# Patient Record
Sex: MALE | Race: WHITE | NOT HISPANIC OR LATINO | Employment: OTHER
[De-identification: names, ages, dates, MRNs, and addresses within clinical notes are randomized per-mention and may not be internally consistent; named-entity substitution may affect disease eponyms.]

---

## 2017-01-25 ENCOUNTER — RECORDS - HEALTHEAST (OUTPATIENT)
Dept: ADMINISTRATIVE | Facility: OTHER | Age: 82
End: 2017-01-25

## 2017-02-22 ENCOUNTER — RECORDS - HEALTHEAST (OUTPATIENT)
Dept: ADMINISTRATIVE | Facility: OTHER | Age: 82
End: 2017-02-22

## 2017-05-08 ENCOUNTER — RECORDS - HEALTHEAST (OUTPATIENT)
Dept: ADMINISTRATIVE | Facility: OTHER | Age: 82
End: 2017-05-08

## 2017-08-29 ENCOUNTER — OFFICE VISIT - HEALTHEAST (OUTPATIENT)
Dept: FAMILY MEDICINE | Facility: CLINIC | Age: 82
End: 2017-08-29

## 2017-08-29 DIAGNOSIS — Z95.5 HISTORY OF HEART ARTERY STENT: ICD-10-CM

## 2017-08-29 DIAGNOSIS — I25.10 CORONARY ARTERY DISEASE: ICD-10-CM

## 2017-08-29 DIAGNOSIS — I10 ESSENTIAL HYPERTENSION: ICD-10-CM

## 2017-08-29 DIAGNOSIS — E03.9 HYPOTHYROIDISM: ICD-10-CM

## 2017-08-29 DIAGNOSIS — M54.16 LUMBAR RADICULAR PAIN: ICD-10-CM

## 2017-08-29 DIAGNOSIS — E78.5 HYPERLIPIDEMIA: ICD-10-CM

## 2017-08-29 DIAGNOSIS — E11.9 TYPE 2 DIABETES MELLITUS (H): ICD-10-CM

## 2017-08-29 DIAGNOSIS — Z95.0 PACEMAKER: ICD-10-CM

## 2017-08-29 LAB — HBA1C MFR BLD: 7.5 % (ref 3.5–6)

## 2017-08-29 RX ORDER — LANCETS 30 GAUGE
EACH MISCELLANEOUS 2 TIMES DAILY
Status: SHIPPED | COMMUNITY
Start: 2016-12-14

## 2017-08-29 RX ORDER — CHLORAL HYDRATE 500 MG
1 CAPSULE ORAL DAILY
Status: SHIPPED | COMMUNITY
Start: 2013-01-31

## 2017-08-29 ASSESSMENT — MIFFLIN-ST. JEOR: SCORE: 1500.09

## 2017-08-31 ENCOUNTER — COMMUNICATION - HEALTHEAST (OUTPATIENT)
Dept: FAMILY MEDICINE | Facility: CLINIC | Age: 82
End: 2017-08-31

## 2017-09-06 ENCOUNTER — COMMUNICATION - HEALTHEAST (OUTPATIENT)
Dept: FAMILY MEDICINE | Facility: CLINIC | Age: 82
End: 2017-09-06

## 2017-09-06 DIAGNOSIS — I10 ESSENTIAL HYPERTENSION: ICD-10-CM

## 2017-09-08 ENCOUNTER — COMMUNICATION - HEALTHEAST (OUTPATIENT)
Dept: FAMILY MEDICINE | Facility: CLINIC | Age: 82
End: 2017-09-08

## 2017-09-08 DIAGNOSIS — E11.9 TYPE 2 DIABETES MELLITUS (H): ICD-10-CM

## 2017-10-03 ENCOUNTER — AMBULATORY - HEALTHEAST (OUTPATIENT)
Dept: NURSING | Facility: CLINIC | Age: 82
End: 2017-10-03

## 2017-11-22 ENCOUNTER — OFFICE VISIT - HEALTHEAST (OUTPATIENT)
Dept: FAMILY MEDICINE | Facility: CLINIC | Age: 82
End: 2017-11-22

## 2017-11-22 ENCOUNTER — COMMUNICATION - HEALTHEAST (OUTPATIENT)
Dept: FAMILY MEDICINE | Facility: CLINIC | Age: 82
End: 2017-11-22

## 2017-11-22 DIAGNOSIS — E66.3 OVERWEIGHT: ICD-10-CM

## 2017-11-22 DIAGNOSIS — Z13.220 ENCOUNTER FOR SCREENING FOR LIPOID DISORDERS: ICD-10-CM

## 2017-11-22 DIAGNOSIS — Z95.5 HISTORY OF HEART ARTERY STENT: ICD-10-CM

## 2017-11-22 DIAGNOSIS — Z95.0 PACEMAKER: ICD-10-CM

## 2017-11-22 DIAGNOSIS — E03.9 HYPOTHYROIDISM: ICD-10-CM

## 2017-11-22 DIAGNOSIS — M54.16 LUMBAR RADICULAR PAIN: ICD-10-CM

## 2017-11-22 DIAGNOSIS — Z00.00 ROUTINE GENERAL MEDICAL EXAMINATION AT A HEALTH CARE FACILITY: ICD-10-CM

## 2017-11-22 DIAGNOSIS — E78.49 OTHER HYPERLIPIDEMIA: ICD-10-CM

## 2017-11-22 DIAGNOSIS — E11.9 TYPE 2 DIABETES MELLITUS (H): ICD-10-CM

## 2017-11-22 DIAGNOSIS — I10 ESSENTIAL HYPERTENSION: ICD-10-CM

## 2017-11-22 DIAGNOSIS — I25.10 CORONARY ARTERY DISEASE: ICD-10-CM

## 2017-11-22 LAB
CHOLEST SERPL-MCNC: 104 MG/DL
FASTING STATUS PATIENT QL REPORTED: YES
HBA1C MFR BLD: 7.5 % (ref 3.5–6)
HDLC SERPL-MCNC: 41 MG/DL
LDLC SERPL CALC-MCNC: 43 MG/DL
TRIGL SERPL-MCNC: 98 MG/DL

## 2017-11-22 ASSESSMENT — MIFFLIN-ST. JEOR: SCORE: 1494.76

## 2018-01-16 ENCOUNTER — OFFICE VISIT - HEALTHEAST (OUTPATIENT)
Dept: FAMILY MEDICINE | Facility: CLINIC | Age: 83
End: 2018-01-16

## 2018-01-16 DIAGNOSIS — H91.90 HEARING LOSS: ICD-10-CM

## 2018-02-14 ENCOUNTER — OFFICE VISIT - HEALTHEAST (OUTPATIENT)
Dept: AUDIOLOGY | Facility: CLINIC | Age: 83
End: 2018-02-14

## 2018-02-14 DIAGNOSIS — H90.3 SENSORINEURAL HEARING LOSS, BILATERAL: ICD-10-CM

## 2018-05-18 ENCOUNTER — RECORDS - HEALTHEAST (OUTPATIENT)
Dept: ADMINISTRATIVE | Facility: OTHER | Age: 83
End: 2018-05-18

## 2018-11-17 ENCOUNTER — COMMUNICATION - HEALTHEAST (OUTPATIENT)
Dept: FAMILY MEDICINE | Facility: CLINIC | Age: 83
End: 2018-11-17

## 2018-11-17 DIAGNOSIS — I10 ESSENTIAL HYPERTENSION: ICD-10-CM

## 2018-11-27 ENCOUNTER — COMMUNICATION - HEALTHEAST (OUTPATIENT)
Dept: FAMILY MEDICINE | Facility: CLINIC | Age: 83
End: 2018-11-27

## 2018-12-15 ENCOUNTER — COMMUNICATION - HEALTHEAST (OUTPATIENT)
Dept: FAMILY MEDICINE | Facility: CLINIC | Age: 83
End: 2018-12-15

## 2018-12-15 DIAGNOSIS — Z95.0 PACEMAKER: ICD-10-CM

## 2018-12-15 DIAGNOSIS — Z95.5 HISTORY OF HEART ARTERY STENT: ICD-10-CM

## 2018-12-15 DIAGNOSIS — I25.10 CORONARY ARTERY DISEASE: ICD-10-CM

## 2018-12-15 DIAGNOSIS — I10 ESSENTIAL HYPERTENSION: ICD-10-CM

## 2019-01-10 ENCOUNTER — COMMUNICATION - HEALTHEAST (OUTPATIENT)
Dept: FAMILY MEDICINE | Facility: CLINIC | Age: 84
End: 2019-01-10

## 2019-01-10 DIAGNOSIS — E11.9 TYPE 2 DIABETES MELLITUS (H): ICD-10-CM

## 2019-01-16 ENCOUNTER — COMMUNICATION - HEALTHEAST (OUTPATIENT)
Dept: FAMILY MEDICINE | Facility: CLINIC | Age: 84
End: 2019-01-16

## 2019-01-23 ENCOUNTER — OFFICE VISIT - HEALTHEAST (OUTPATIENT)
Dept: FAMILY MEDICINE | Facility: CLINIC | Age: 84
End: 2019-01-23

## 2019-01-23 DIAGNOSIS — E03.9 HYPOTHYROIDISM, UNSPECIFIED TYPE: ICD-10-CM

## 2019-01-23 DIAGNOSIS — Z95.5 HISTORY OF HEART ARTERY STENT: ICD-10-CM

## 2019-01-23 DIAGNOSIS — I25.10 CORONARY ARTERY DISEASE INVOLVING NATIVE HEART WITHOUT ANGINA PECTORIS, UNSPECIFIED VESSEL OR LESION TYPE: ICD-10-CM

## 2019-01-23 DIAGNOSIS — M54.16 LUMBAR RADICULAR PAIN: ICD-10-CM

## 2019-01-23 DIAGNOSIS — E78.49 OTHER HYPERLIPIDEMIA: ICD-10-CM

## 2019-01-23 DIAGNOSIS — Z95.0 PACEMAKER: ICD-10-CM

## 2019-01-23 DIAGNOSIS — I10 ESSENTIAL HYPERTENSION: ICD-10-CM

## 2019-01-23 DIAGNOSIS — E11.9 TYPE 2 DIABETES MELLITUS (H): ICD-10-CM

## 2019-01-23 DIAGNOSIS — I25.10 CORONARY ARTERY DISEASE: ICD-10-CM

## 2019-01-23 LAB
ALBUMIN SERPL-MCNC: 3.4 G/DL (ref 3.5–5)
ALP SERPL-CCNC: 90 U/L (ref 45–120)
ALT SERPL W P-5'-P-CCNC: 32 U/L (ref 0–45)
ANION GAP SERPL CALCULATED.3IONS-SCNC: 11 MMOL/L (ref 5–18)
AST SERPL W P-5'-P-CCNC: 25 U/L (ref 0–40)
BILIRUB SERPL-MCNC: 1.2 MG/DL (ref 0–1)
BUN SERPL-MCNC: 25 MG/DL (ref 8–28)
CALCIUM SERPL-MCNC: 9.3 MG/DL (ref 8.5–10.5)
CHLORIDE BLD-SCNC: 99 MMOL/L (ref 98–107)
CHOLEST SERPL-MCNC: 93 MG/DL
CO2 SERPL-SCNC: 23 MMOL/L (ref 22–31)
CREAT SERPL-MCNC: 1.02 MG/DL (ref 0.7–1.3)
CREAT UR-MCNC: 85.6 MG/DL
FASTING STATUS PATIENT QL REPORTED: NO
GFR SERPL CREATININE-BSD FRML MDRD: >60 ML/MIN/1.73M2
GLUCOSE BLD-MCNC: 209 MG/DL (ref 70–125)
HBA1C MFR BLD: 8.1 % (ref 3.5–6)
HDLC SERPL-MCNC: 41 MG/DL
LDLC SERPL CALC-MCNC: 37 MG/DL
MICROALBUMIN UR-MCNC: 2.77 MG/DL (ref 0–1.99)
MICROALBUMIN/CREAT UR: 32.4 MG/G
POTASSIUM BLD-SCNC: 4.6 MMOL/L (ref 3.5–5)
PROT SERPL-MCNC: 6.5 G/DL (ref 6–8)
SODIUM SERPL-SCNC: 133 MMOL/L (ref 136–145)
TRIGL SERPL-MCNC: 77 MG/DL
TSH SERPL DL<=0.005 MIU/L-ACNC: 2.29 UIU/ML (ref 0.3–5)

## 2019-01-23 ASSESSMENT — MIFFLIN-ST. JEOR: SCORE: 1495.1

## 2019-01-24 ENCOUNTER — COMMUNICATION - HEALTHEAST (OUTPATIENT)
Dept: FAMILY MEDICINE | Facility: CLINIC | Age: 84
End: 2019-01-24

## 2019-01-30 ENCOUNTER — COMMUNICATION - HEALTHEAST (OUTPATIENT)
Dept: PHYSICAL MEDICINE AND REHAB | Facility: CLINIC | Age: 84
End: 2019-01-30

## 2019-04-23 ENCOUNTER — OFFICE VISIT - HEALTHEAST (OUTPATIENT)
Dept: FAMILY MEDICINE | Facility: CLINIC | Age: 84
End: 2019-04-23

## 2019-04-23 DIAGNOSIS — Z95.5 HISTORY OF HEART ARTERY STENT: ICD-10-CM

## 2019-04-23 DIAGNOSIS — E11.29 TYPE 2 DIABETES MELLITUS WITH MICROALBUMINURIA, WITHOUT LONG-TERM CURRENT USE OF INSULIN (H): ICD-10-CM

## 2019-04-23 DIAGNOSIS — I10 ESSENTIAL HYPERTENSION: ICD-10-CM

## 2019-04-23 DIAGNOSIS — H61.22 IMPACTED CERUMEN OF LEFT EAR: ICD-10-CM

## 2019-04-23 DIAGNOSIS — E78.49 OTHER HYPERLIPIDEMIA: ICD-10-CM

## 2019-04-23 DIAGNOSIS — E03.9 HYPOTHYROIDISM, UNSPECIFIED TYPE: ICD-10-CM

## 2019-04-23 DIAGNOSIS — I25.10 CORONARY ARTERY DISEASE INVOLVING NATIVE HEART WITHOUT ANGINA PECTORIS, UNSPECIFIED VESSEL OR LESION TYPE: ICD-10-CM

## 2019-04-23 DIAGNOSIS — R80.9 TYPE 2 DIABETES MELLITUS WITH MICROALBUMINURIA, WITHOUT LONG-TERM CURRENT USE OF INSULIN (H): ICD-10-CM

## 2019-04-23 LAB — HBA1C MFR BLD: 7.5 % (ref 3.5–6)

## 2019-04-23 ASSESSMENT — MIFFLIN-ST. JEOR: SCORE: 1474.13

## 2019-05-13 ENCOUNTER — OFFICE VISIT - HEALTHEAST (OUTPATIENT)
Dept: FAMILY MEDICINE | Facility: CLINIC | Age: 84
End: 2019-05-13

## 2019-05-13 DIAGNOSIS — E78.49 OTHER HYPERLIPIDEMIA: ICD-10-CM

## 2019-05-13 DIAGNOSIS — I10 ESSENTIAL HYPERTENSION: ICD-10-CM

## 2019-05-13 DIAGNOSIS — Z95.5 HISTORY OF HEART ARTERY STENT: ICD-10-CM

## 2019-05-13 DIAGNOSIS — I25.10 CORONARY ARTERY DISEASE: ICD-10-CM

## 2019-05-13 DIAGNOSIS — R80.9 TYPE 2 DIABETES MELLITUS WITH MICROALBUMINURIA, WITHOUT LONG-TERM CURRENT USE OF INSULIN (H): ICD-10-CM

## 2019-05-13 DIAGNOSIS — Z95.0 PACEMAKER: ICD-10-CM

## 2019-05-13 DIAGNOSIS — Z00.00 ROUTINE GENERAL MEDICAL EXAMINATION AT A HEALTH CARE FACILITY: ICD-10-CM

## 2019-05-13 DIAGNOSIS — E11.29 TYPE 2 DIABETES MELLITUS WITH MICROALBUMINURIA, WITHOUT LONG-TERM CURRENT USE OF INSULIN (H): ICD-10-CM

## 2019-05-13 DIAGNOSIS — E03.9 HYPOTHYROIDISM, UNSPECIFIED TYPE: ICD-10-CM

## 2019-05-13 LAB
ALBUMIN SERPL-MCNC: 3.8 G/DL (ref 3.5–5)
ALP SERPL-CCNC: 78 U/L (ref 45–120)
ALT SERPL W P-5'-P-CCNC: 24 U/L (ref 0–45)
ANION GAP SERPL CALCULATED.3IONS-SCNC: 9 MMOL/L (ref 5–18)
AST SERPL W P-5'-P-CCNC: 22 U/L (ref 0–40)
BILIRUB SERPL-MCNC: 0.8 MG/DL (ref 0–1)
BUN SERPL-MCNC: 23 MG/DL (ref 8–28)
CALCIUM SERPL-MCNC: 9.9 MG/DL (ref 8.5–10.5)
CHLORIDE BLD-SCNC: 101 MMOL/L (ref 98–107)
CHOLEST SERPL-MCNC: 114 MG/DL
CO2 SERPL-SCNC: 24 MMOL/L (ref 22–31)
CREAT SERPL-MCNC: 0.83 MG/DL (ref 0.7–1.3)
FASTING STATUS PATIENT QL REPORTED: YES
GFR SERPL CREATININE-BSD FRML MDRD: >60 ML/MIN/1.73M2
GLUCOSE BLD-MCNC: 123 MG/DL (ref 70–125)
HDLC SERPL-MCNC: 43 MG/DL
LDLC SERPL CALC-MCNC: 53 MG/DL
POTASSIUM BLD-SCNC: 4.6 MMOL/L (ref 3.5–5)
PROT SERPL-MCNC: 6.7 G/DL (ref 6–8)
SODIUM SERPL-SCNC: 134 MMOL/L (ref 136–145)
TRIGL SERPL-MCNC: 89 MG/DL
TSH SERPL DL<=0.005 MIU/L-ACNC: 1.74 UIU/ML (ref 0.3–5)

## 2019-05-13 ASSESSMENT — MIFFLIN-ST. JEOR: SCORE: 1487.39

## 2019-05-14 ENCOUNTER — COMMUNICATION - HEALTHEAST (OUTPATIENT)
Dept: FAMILY MEDICINE | Facility: CLINIC | Age: 84
End: 2019-05-14

## 2019-06-07 ENCOUNTER — RECORDS - HEALTHEAST (OUTPATIENT)
Dept: ADMINISTRATIVE | Facility: OTHER | Age: 84
End: 2019-06-07

## 2019-06-19 ENCOUNTER — RECORDS - HEALTHEAST (OUTPATIENT)
Dept: HEALTH INFORMATION MANAGEMENT | Facility: CLINIC | Age: 84
End: 2019-06-19

## 2019-09-21 ENCOUNTER — COMMUNICATION - HEALTHEAST (OUTPATIENT)
Dept: FAMILY MEDICINE | Facility: CLINIC | Age: 84
End: 2019-09-21

## 2019-09-21 DIAGNOSIS — E11.9 TYPE 2 DIABETES MELLITUS (H): ICD-10-CM

## 2019-09-25 ENCOUNTER — COMMUNICATION - HEALTHEAST (OUTPATIENT)
Dept: FAMILY MEDICINE | Facility: CLINIC | Age: 84
End: 2019-09-25

## 2019-09-25 DIAGNOSIS — E11.9 TYPE 2 DIABETES MELLITUS (H): ICD-10-CM

## 2019-10-29 ENCOUNTER — COMMUNICATION - HEALTHEAST (OUTPATIENT)
Dept: FAMILY MEDICINE | Facility: CLINIC | Age: 84
End: 2019-10-29

## 2019-10-29 DIAGNOSIS — E78.49 OTHER HYPERLIPIDEMIA: ICD-10-CM

## 2020-01-03 ENCOUNTER — COMMUNICATION - HEALTHEAST (OUTPATIENT)
Dept: FAMILY MEDICINE | Facility: CLINIC | Age: 85
End: 2020-01-03

## 2020-01-03 DIAGNOSIS — I10 ESSENTIAL HYPERTENSION: ICD-10-CM

## 2020-01-19 ENCOUNTER — COMMUNICATION - HEALTHEAST (OUTPATIENT)
Dept: FAMILY MEDICINE | Facility: CLINIC | Age: 85
End: 2020-01-19

## 2020-01-19 DIAGNOSIS — E11.9 TYPE 2 DIABETES MELLITUS (H): ICD-10-CM

## 2020-01-31 ENCOUNTER — COMMUNICATION - HEALTHEAST (OUTPATIENT)
Dept: FAMILY MEDICINE | Facility: CLINIC | Age: 85
End: 2020-01-31

## 2020-01-31 DIAGNOSIS — I10 ESSENTIAL HYPERTENSION: ICD-10-CM

## 2020-01-31 DIAGNOSIS — E11.29 TYPE 2 DIABETES MELLITUS WITH MICROALBUMINURIA, WITHOUT LONG-TERM CURRENT USE OF INSULIN (H): ICD-10-CM

## 2020-01-31 DIAGNOSIS — R80.9 TYPE 2 DIABETES MELLITUS WITH MICROALBUMINURIA, WITHOUT LONG-TERM CURRENT USE OF INSULIN (H): ICD-10-CM

## 2020-01-31 DIAGNOSIS — E03.9 HYPOTHYROIDISM, UNSPECIFIED TYPE: ICD-10-CM

## 2020-03-29 ENCOUNTER — COMMUNICATION - HEALTHEAST (OUTPATIENT)
Dept: FAMILY MEDICINE | Facility: CLINIC | Age: 85
End: 2020-03-29

## 2020-03-29 DIAGNOSIS — I10 ESSENTIAL HYPERTENSION: ICD-10-CM

## 2020-04-06 ENCOUNTER — COMMUNICATION - HEALTHEAST (OUTPATIENT)
Dept: FAMILY MEDICINE | Facility: CLINIC | Age: 85
End: 2020-04-06

## 2020-04-06 DIAGNOSIS — I10 ESSENTIAL HYPERTENSION: ICD-10-CM

## 2020-04-28 ENCOUNTER — COMMUNICATION - HEALTHEAST (OUTPATIENT)
Dept: FAMILY MEDICINE | Facility: CLINIC | Age: 85
End: 2020-04-28

## 2020-04-28 DIAGNOSIS — I10 ESSENTIAL HYPERTENSION: ICD-10-CM

## 2020-05-09 ENCOUNTER — COMMUNICATION - HEALTHEAST (OUTPATIENT)
Dept: FAMILY MEDICINE | Facility: CLINIC | Age: 85
End: 2020-05-09

## 2020-05-09 DIAGNOSIS — E03.9 HYPOTHYROIDISM, UNSPECIFIED TYPE: ICD-10-CM

## 2020-05-11 RX ORDER — LEVOTHYROXINE SODIUM 125 UG/1
TABLET ORAL
Qty: 90 TABLET | Refills: 3 | Status: SHIPPED | OUTPATIENT
Start: 2020-05-11

## 2020-06-01 ENCOUNTER — COMMUNICATION - HEALTHEAST (OUTPATIENT)
Dept: FAMILY MEDICINE | Facility: CLINIC | Age: 85
End: 2020-06-01

## 2020-06-01 DIAGNOSIS — E11.9 TYPE 2 DIABETES MELLITUS (H): ICD-10-CM

## 2020-06-29 ENCOUNTER — COMMUNICATION - HEALTHEAST (OUTPATIENT)
Dept: FAMILY MEDICINE | Facility: CLINIC | Age: 85
End: 2020-06-29

## 2020-06-29 DIAGNOSIS — Z95.5 HISTORY OF HEART ARTERY STENT: ICD-10-CM

## 2020-06-29 DIAGNOSIS — I25.10 CORONARY ARTERY DISEASE: ICD-10-CM

## 2020-06-29 DIAGNOSIS — I10 ESSENTIAL HYPERTENSION: ICD-10-CM

## 2020-06-29 DIAGNOSIS — Z95.0 PACEMAKER: ICD-10-CM

## 2020-06-29 RX ORDER — IRBESARTAN 150 MG/1
TABLET ORAL
Qty: 90 TABLET | Refills: 3 | Status: ON HOLD | OUTPATIENT
Start: 2020-06-29 | End: 2022-03-10

## 2020-07-02 ENCOUNTER — COMMUNICATION - HEALTHEAST (OUTPATIENT)
Dept: FAMILY MEDICINE | Facility: CLINIC | Age: 85
End: 2020-07-02

## 2020-07-02 DIAGNOSIS — I10 ESSENTIAL HYPERTENSION: ICD-10-CM

## 2020-07-16 ENCOUNTER — COMMUNICATION - HEALTHEAST (OUTPATIENT)
Dept: FAMILY MEDICINE | Facility: CLINIC | Age: 85
End: 2020-07-16

## 2020-07-16 DIAGNOSIS — E78.49 OTHER HYPERLIPIDEMIA: ICD-10-CM

## 2020-07-16 RX ORDER — ATORVASTATIN CALCIUM 80 MG/1
TABLET, FILM COATED ORAL
Qty: 90 TABLET | Refills: 2 | Status: SHIPPED | OUTPATIENT
Start: 2020-07-16

## 2020-07-22 ENCOUNTER — COMMUNICATION - HEALTHEAST (OUTPATIENT)
Dept: FAMILY MEDICINE | Facility: CLINIC | Age: 85
End: 2020-07-22

## 2020-07-22 DIAGNOSIS — I10 ESSENTIAL HYPERTENSION: ICD-10-CM

## 2020-07-27 ENCOUNTER — OFFICE VISIT - HEALTHEAST (OUTPATIENT)
Dept: FAMILY MEDICINE | Facility: CLINIC | Age: 85
End: 2020-07-27

## 2020-07-27 DIAGNOSIS — Z95.0 PACEMAKER: ICD-10-CM

## 2020-07-27 DIAGNOSIS — H61.22 IMPACTED CERUMEN OF LEFT EAR: ICD-10-CM

## 2020-07-27 DIAGNOSIS — R80.9 TYPE 2 DIABETES MELLITUS WITH MICROALBUMINURIA, WITHOUT LONG-TERM CURRENT USE OF INSULIN (H): ICD-10-CM

## 2020-07-27 DIAGNOSIS — E03.9 HYPOTHYROIDISM, UNSPECIFIED TYPE: ICD-10-CM

## 2020-07-27 DIAGNOSIS — Z95.5 HISTORY OF HEART ARTERY STENT: ICD-10-CM

## 2020-07-27 DIAGNOSIS — E78.49 OTHER HYPERLIPIDEMIA: ICD-10-CM

## 2020-07-27 DIAGNOSIS — I10 ESSENTIAL HYPERTENSION: ICD-10-CM

## 2020-07-27 DIAGNOSIS — Z00.00 ROUTINE GENERAL MEDICAL EXAMINATION AT A HEALTH CARE FACILITY: ICD-10-CM

## 2020-07-27 DIAGNOSIS — E11.29 TYPE 2 DIABETES MELLITUS WITH MICROALBUMINURIA, WITHOUT LONG-TERM CURRENT USE OF INSULIN (H): ICD-10-CM

## 2020-07-27 DIAGNOSIS — I25.10 CORONARY ARTERY DISEASE INVOLVING NATIVE HEART WITHOUT ANGINA PECTORIS, UNSPECIFIED VESSEL OR LESION TYPE: ICD-10-CM

## 2020-07-27 LAB
ALBUMIN SERPL-MCNC: 3.6 G/DL (ref 3.5–5)
ALP SERPL-CCNC: 81 U/L (ref 45–120)
ALT SERPL W P-5'-P-CCNC: 23 U/L (ref 0–45)
ANION GAP SERPL CALCULATED.3IONS-SCNC: 10 MMOL/L (ref 5–18)
AST SERPL W P-5'-P-CCNC: 19 U/L (ref 0–40)
BILIRUB SERPL-MCNC: 0.8 MG/DL (ref 0–1)
BUN SERPL-MCNC: 21 MG/DL (ref 8–28)
CALCIUM SERPL-MCNC: 9.4 MG/DL (ref 8.5–10.5)
CHLORIDE BLD-SCNC: 102 MMOL/L (ref 98–107)
CHOLEST SERPL-MCNC: 99 MG/DL
CO2 SERPL-SCNC: 23 MMOL/L (ref 22–31)
CREAT SERPL-MCNC: 0.9 MG/DL (ref 0.7–1.3)
CREAT UR-MCNC: 74.1 MG/DL
FASTING STATUS PATIENT QL REPORTED: NO
GFR SERPL CREATININE-BSD FRML MDRD: >60 ML/MIN/1.73M2
GLUCOSE BLD-MCNC: 145 MG/DL (ref 70–125)
HBA1C MFR BLD: 6.6 % (ref 3.5–6)
HDLC SERPL-MCNC: 39 MG/DL
LDLC SERPL CALC-MCNC: 39 MG/DL
MICROALBUMIN UR-MCNC: 1.25 MG/DL (ref 0–1.99)
MICROALBUMIN/CREAT UR: 16.9 MG/G
POTASSIUM BLD-SCNC: 4.5 MMOL/L (ref 3.5–5)
PROT SERPL-MCNC: 6.6 G/DL (ref 6–8)
SODIUM SERPL-SCNC: 135 MMOL/L (ref 136–145)
TRIGL SERPL-MCNC: 104 MG/DL
TSH SERPL DL<=0.005 MIU/L-ACNC: 0.88 UIU/ML (ref 0.3–5)

## 2020-07-27 RX ORDER — HYDROCHLOROTHIAZIDE 25 MG/1
12.5 TABLET ORAL DAILY
Qty: 45 TABLET | Refills: 3 | Status: ON HOLD | OUTPATIENT
Start: 2020-10-02 | End: 2022-03-10

## 2020-07-27 RX ORDER — METOPROLOL SUCCINATE 25 MG/1
TABLET, EXTENDED RELEASE ORAL
Qty: 90 TABLET | Refills: 3 | Status: SHIPPED | OUTPATIENT
Start: 2020-10-02

## 2020-07-27 ASSESSMENT — MIFFLIN-ST. JEOR: SCORE: 1483.56

## 2020-07-30 ENCOUNTER — COMMUNICATION - HEALTHEAST (OUTPATIENT)
Dept: FAMILY MEDICINE | Facility: CLINIC | Age: 85
End: 2020-07-30

## 2020-09-23 ENCOUNTER — RECORDS - HEALTHEAST (OUTPATIENT)
Dept: ADMINISTRATIVE | Facility: OTHER | Age: 85
End: 2020-09-23

## 2021-01-20 ENCOUNTER — AMBULATORY - HEALTHEAST (OUTPATIENT)
Dept: FAMILY MEDICINE | Facility: CLINIC | Age: 86
End: 2021-01-20

## 2021-01-20 DIAGNOSIS — E11.9 TYPE 2 DIABETES MELLITUS (H): ICD-10-CM

## 2021-01-22 ENCOUNTER — COMMUNICATION - HEALTHEAST (OUTPATIENT)
Dept: FAMILY MEDICINE | Facility: CLINIC | Age: 86
End: 2021-01-22

## 2021-01-22 DIAGNOSIS — E11.9 TYPE 2 DIABETES MELLITUS (H): ICD-10-CM

## 2021-02-09 ENCOUNTER — AMBULATORY - HEALTHEAST (OUTPATIENT)
Dept: NURSING | Facility: CLINIC | Age: 86
End: 2021-02-09

## 2021-03-02 ENCOUNTER — AMBULATORY - HEALTHEAST (OUTPATIENT)
Dept: NURSING | Facility: CLINIC | Age: 86
End: 2021-03-02

## 2021-05-28 NOTE — PROGRESS NOTES
Assessment and Plan:       1. Routine general medical examination at a health care facility  I encouraged him to stay physically active and to keep eating a healthy diet.  We are going to check fasting labs today.    2. Type 2 diabetes mellitus with microalbuminuria, without long-term current use of insulin (H)  His hemoglobin A1c was 7.5% last month.  I recommended he continue metformin 1000 mg twice daily.    3. Essential hypertension  His blood pressures under good control on metoprolol XL 25 mg daily, hydrochlorothiazide 12.5 mg daily and irbesartan 150 mg daily.  We will be evaluating his kidney function as part of today's labs.  - irbesartan (AVAPRO) 150 MG tablet; Take 1 tablet (150 mg total) by mouth daily.  Dispense: 90 tablet; Refill: 3    4. Coronary artery disease  This issue has been stable on his current medications.  He will be due to follow-up with his cardiologist this summer.    5. History of heart artery stent  He will be following up with cardiology this summer    6. Pacemaker  Stable    7. Hypothyroidism, unspecified type  Recommended she continue levothyroxine 125 mcg daily.  We are going to check his TSH level again today.  - Thyroid Cascade    8. Other hyperlipidemia  Continue atorvastatin 80 mg daily.  - Comprehensive Metabolic Panel  - Lipid Cascade     The patient's current medical problems were reviewed.    The following high BMI interventions were performed this visit: encouragement to exercise and lifestyle education regarding diet  The following health maintenance schedule was reviewed with the patient and provided in printed form in the after visit summary:   Health Maintenance   Topic Date Due     ZOSTER VACCINES (1 of 2) 07/23/1976     DIABETES OPHTHALMOLOGY EXAM  05/18/2019     DIABETES HEMOGLOBIN A1C  10/23/2019     DIABETES FOLLOW-UP  10/23/2019     DIABETES FOOT EXAM  01/23/2020     DIABETES URINE MICROALBUMIN  01/23/2020     FALL RISK ASSESSMENT  01/23/2020     TD 18+ HE   06/30/2020     ADVANCE DIRECTIVES DISCUSSED WITH PATIENT  11/22/2022     PNEUMOCOCCAL POLYSACCHARIDE VACCINE AGE 65 AND OVER  Completed     INFLUENZA VACCINE RULE BASED  Completed     PNEUMOCOCCAL CONJUGATE VACCINE FOR ADULTS (PCV13 OR PREVNAR)  Completed        Subjective:   Chief Complaint: Lionel Chávez is an 89 y.o. male here for an Annual Wellness visit.   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  He feels like his medical conditions are stable.  His blood sugars have been running in the low-mid 100s.  Highest lately is low.  He denies hypoglycemic episodes.  His hemoglobin A1c on 4/23/2019 was 7.5%.  He continues to take metformin 1000 mg twice daily.  He denies concerns regarding urination, bowel movements or mood.  Occasionally he does not sleep well, but this is been going on for several years.  1/23/2019 he had a normal TSH, LDL 37 and the CMP appeared stable, although sodium was 133.    Social history:       Review of Systems:  Please see above.  The rest of the review of systems are negative for all systems.    Patient Care Team:  Ziyad Ramos,  as PCP - General (Family Medicine)  Dexter Salinas MD (Cardiology)     Patient Active Problem List   Diagnosis     Type 2 diabetes mellitus (H)     Essential hypertension     Coronary artery disease     Hypothyroidism     History of heart artery stent     Pacemaker     Lumbar radicular pain     Hyperlipidemia     Overweight     No past medical history on file.   No past surgical history on file.   No family history on file.   Social History     Socioeconomic History     Marital status:      Spouse name: Not on file     Number of children: Not on file     Years of education: Not on file     Highest  education level: Not on file   Occupational History     Not on file   Social Needs     Financial resource strain: Not on file     Food insecurity:     Worry: Not on file     Inability: Not on file     Transportation needs:     Medical: Not on file     Non-medical: Not on file   Tobacco Use     Smoking status: Never Smoker     Smokeless tobacco: Never Used   Substance and Sexual Activity     Alcohol use: No     Drug use: No     Sexual activity: Not on file   Lifestyle     Physical activity:     Days per week: Not on file     Minutes per session: Not on file     Stress: Not on file   Relationships     Social connections:     Talks on phone: Not on file     Gets together: Not on file     Attends [employer] service: Not on file     Active member of club or organization: Not on file     Attends meetings of clubs or organizations: Not on file     Relationship status: Not on file     Intimate partner violence:     Fear of current or ex partner: Not on file     Emotionally abused: Not on file     Physically abused: Not on file     Forced sexual activity: Not on file   Other Topics Concern     Not on file   Social History Narrative     Not on file      Current Outpatient Medications   Medication Sig Dispense Refill     aspirin 81 MG EC tablet Take 81 mg by mouth daily.       atorvastatin (LIPITOR) 80 MG tablet TAKE 1 TABLET (80 MG TOTAL) BY MOUTH AT BEDTIME. 90 tablet 2     FLAXSEED ORAL Take by mouth daily.       hydroCHLOROthiazide (HYDRODIURIL) 25 MG tablet TAKE 1/2 TABLET BY MOUTH ONCE DAILY 45 tablet 3     irbesartan (AVAPRO) 150 MG tablet Take 1 tablet (150 mg total) by mouth daily. 90 tablet 3     lancets 33 gauge Misc 2 (two) times a day.       levothyroxine (SYNTHROID, LEVOTHROID) 125 MCG tablet Take 1 tablet (125 mcg total) by mouth daily. 90 tablet 3     metFORMIN (GLUCOPHAGE) 1000 MG tablet Take 1 tablet (1,000 mg total) by mouth 2 (two) times a day with meals. 180 tablet 3     metoprolol succinate (TOPROL-XL) 25  "MG TAKE 1/2 TABLET BY MOUTH 2 TIMES DAILY 90 tablet 3     omega-3 fatty acids-fish oil 340-1,000 mg cap Take 2 capsules by mouth daily.       ONETOUCH VERIO strips TEST BLOOD SUGAR 1-2 TIMES DAILY. 200 strip 2     No current facility-administered medications for this visit.       Objective:   Vital Signs:   Visit Vitals  /66 (Patient Site: Left Arm, Patient Position: Sitting, Cuff Size: Adult Large)   Pulse 68   Temp 97.8  F (36.6  C) (Oral)   Resp 16   Ht 5' 8\" (1.727 m)   Wt 192 lb 7 oz (87.3 kg)   BMI 29.26 kg/m         VisionScreening:  No exam data present     PHYSICAL EXAM  General Appearance: Alert, cooperative, no distress, appears stated age  Head: Normocephalic, without obvious abnormality, atraumatic  Eyes: PERRL, conjunctiva/corneas clear, EOM's intact  Ears: Normal TM's and external ear canals, both ears  Nose: Nares normal, septum midline,mucosa normal, no drainage  Throat: Lips, mucosa, and tongue normal; teeth and gums normal  Neck: Supple, symmetrical, trachea midline, no adenopathy;  thyroid: not enlarged, symmetric, no tenderness/mass/nodules  Back: Symmetric, no curvature, ROM normal, no CVA tenderness  Lungs: Clear to auscultation bilaterally, respirations unlabored  Heart: Regular rate and rhythm, S1 and S2 normal, no murmur, rub, or gallop,  Abdomen: Soft, non-tender, bowel sounds active all four quadrants,  no masses, no organomegaly  Musculoskeletal: Normal range of motion. No joint swelling or deformity.   Extremities: Extremities normal, atraumatic, no cyanosis or edema  Skin: Skin color, texture, turgor normal, no rashes or lesions  Lymph nodes: Cervical, supraclavicular, and axillary nodes normal  Neurologic: He is alert.  Normal speech.  No focal deficits.  Normal deep tendon reflexes.   Psychiatric: He has a normal mood and affect.       Assessment Results 5/13/2019   Activities of Daily Living No help needed   Instrumental Activities of Daily Living No help needed   Mini Cog " Total Score 3   Some recent data might be hidden     A Mini-Cog score of 0-2 suggests the possibility of dementia, score of 3-5 suggests no dementia    Identified Health Risks:     The patient was provided with written information regarding signs of hearing loss.  Information on urinary incontinence and treatment options given to patient.  Patient's advanced directive was discussed and I am comfortable with the patient's wishes.

## 2021-05-28 NOTE — PROGRESS NOTES
Assessment / Impression     1. Type 2 diabetes mellitus with microalbuminuria, without long-term current use of insulin (H)  Glycosylated Hemoglobin A1c    JI RED   2. Other hyperlipidemia     3. Hypothyroidism, unspecified type     4. Impacted cerumen of left ear     5. Essential hypertension     6. Coronary artery disease involving native heart without angina pectoris, unspecified vessel or lesion type     7. History of heart artery stent           Plan:     His hemoglobin A1c is 7.5 % today.  I recommended he continue the metformin at 1000 mg twice daily.  I encouraged him to continue getting regular exercise and to eat healthy diet.  His blood pressure is under good control on his current medications.  No changes were made.  I personally removed a large amount of cerumen from the left ear canal using the lighted spatula.  He tolerated this procedure well.  He may schedule follow-up appointment in 6 months for physical exam.    Subjective:      HPI: Lionel Chávez is a 89 y.o. male who since to the clinic for a follow-up visit.  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  At his last appointment on 1/23/2019 his hemoglobin A1c was up to 8.1%.  I recommended he increase the metformin dose to 1000 mg twice daily.  He has been tolerating this well.  He reports that his blood glucose readings have been consistently in the low-mid 100s.  He denies hypoglycemic episodes.  He is trying to eat healthy foods and he gets regular exercise.  On 1/23/2019 his TSH level was normal, LDL 37, positive microalbuminuria and his CMP was stable.    He was last seen by cardiology on 7/25/18 he did not recommend any further cardiac testing as long as he is asymptomatic.       Review of Systems  All other systems  "reviewed and are negative.     Social History     Tobacco Use   Smoking Status Never Smoker   Smokeless Tobacco Never Used       No family history on file.    Objective:     /76 (Patient Site: Left Arm, Patient Position: Sitting, Cuff Size: Adult Large)   Pulse 60   Temp 97.9  F (36.6  C) (Oral)   Resp 16   Ht 5' 7.7\" (1.72 m)   Wt 190 lb 9 oz (86.4 kg)   BMI 29.23 kg/m    Physical Examination: General appearance - alert, well appearing, and in no distress  Eyes: pupils equal and reactive, extraocular eye movements intact  Ears: The right canal and tympanic membrane are clear.  The left canal is impacted with cerumen.  Once this was removed the tympanic membrane appeared clear.  Mouth: mucous membranes moist, pharynx normal without lesions  Neck: supple, no significant adenopathy  Lungs: clear to auscultation, no wheezes, rales or rhonchi, symmetric air entry  Heart: normal rate, regular rhythm, normal S1, S2, no murmurs, rubs, clicks or gallops  Abdomen: soft, nontender, nondistended, no masses or organomegaly  Neurological: alert, oriented, normal speech, no focal findings or movement disorder noted.   Extremities: No edema, no clubbing or cyanosis.   Psychiatric: Normal affect. Does not appear anxious or depressed.    Recent Results (from the past 168 hour(s))   Glycosylated Hemoglobin A1c   Result Value Ref Range    Hemoglobin A1c 7.5 (H) 3.5 - 6.0 %       Current Outpatient Medications   Medication Sig Note     aspirin 81 MG EC tablet Take 81 mg by mouth daily. 8/29/2017: [facility] Received Sig: Take 1 tablet by mouth once daily with a meal.     atorvastatin (LIPITOR) 80 MG tablet TAKE 1 TABLET (80 MG TOTAL) BY MOUTH AT BEDTIME.      FLAXSEED ORAL Take by mouth daily.      hydroCHLOROthiazide (HYDRODIURIL) 25 MG tablet TAKE 1/2 TABLET BY MOUTH ONCE DAILY      irbesartan (AVAPRO) 150 MG tablet Take 1 tablet (150 mg total) by mouth daily.      lancets " 33 gauge Misc 2 (two) times a day. 8/29/2017: Received from: [facility] Received Sig: As directed. Test 1-2 times daily     levothyroxine (SYNTHROID, LEVOTHROID) 125 MCG tablet Take 1 tablet (125 mcg total) by mouth daily.      metFORMIN (GLUCOPHAGE) 1000 MG tablet Take 1 tablet (1,000 mg total) by mouth 2 (two) times a day with meals.      metoprolol succinate (TOPROL-XL) 25 MG TAKE 1/2 TABLET BY MOUTH 2 TIMES DAILY      omega-3 fatty acids-fish oil 340-1,000 mg cap Take 2 capsules by mouth daily. 8/29/2017: Received from: [facility] Received Sig: Take 2 capsules by mouth once daily.     ONETOUCH VERIO strips TEST BLOOD SUGAR 1-2 TIMES DAILY.

## 2021-05-31 VITALS — HEIGHT: 69 IN | WEIGHT: 192.31 LBS | BODY MASS INDEX: 28.48 KG/M2

## 2021-05-31 VITALS — WEIGHT: 194.19 LBS | BODY MASS INDEX: 29.43 KG/M2 | HEIGHT: 68 IN

## 2021-06-01 NOTE — TELEPHONE ENCOUNTER
Refill Approved    Rx renewed per Medication Renewal Policy. Medication was last renewed on 1/11/2019 for 200/2.  Last OV 5/13/2019 PE  Jane Anthony, [facility] Triage/Med Refill 9/21/2019     Requested Prescriptions   Pending Prescriptions Disp Refills     ONETOUCH VERIO strips [Pharmacy Med Name: ONE TOUCH VERIO TEST STRIP] 200 strip 2     Sig: USE TO TEST BLOOD SUGAR 1-2 TIMES DAILY.       Diabetic Supplies Refill Protocol Passed - 9/21/2019 12:47 AM        Passed - Visit with PCP or prescribing provider visit in last 6 months     Last office visit with prescriber/PCP: 4/23/2019 Ziyad Ramos DO OR same dept: 4/23/2019 Ziyad Ramos DO OR same specialty: 4/23/2019 Ziyad Ramos DO  Last physical: 5/13/2019 Last MTM visit: Visit date not found   Next visit within 3 mo: Visit date not found  Next physical within 3 mo: Visit date not found  Prescriber OR PCP: Ziyad Wilhelm DO  Last diagnosis associated with med order: 1. Type 2 diabetes mellitus (H)  - ONETOUCH VERIO strips [Pharmacy Med Name: ONE TOUCH VERIO TEST STRIP]; USE TO TEST BLOOD SUGAR 1-2 TIMES DAILY.  Dispense: 200 strip; Refill: 2    If protocol passes may refill for 12 months if within 3 months of last provider visit (or a total of 15 months).             Passed - A1C in last 6 months     Hemoglobin A1c   Date Value Ref Range Status   04/23/2019 7.5 (H) 3.5 - 6.0 % Final

## 2021-06-02 VITALS — BODY MASS INDEX: 29.58 KG/M2 | WEIGHT: 195.19 LBS | HEIGHT: 68 IN

## 2021-06-03 VITALS — HEIGHT: 68 IN | BODY MASS INDEX: 29.17 KG/M2 | WEIGHT: 192.44 LBS

## 2021-06-03 VITALS — HEIGHT: 68 IN | BODY MASS INDEX: 28.88 KG/M2 | WEIGHT: 190.56 LBS

## 2021-06-04 VITALS
HEIGHT: 69 IN | HEART RATE: 76 BPM | BODY MASS INDEX: 27.83 KG/M2 | RESPIRATION RATE: 16 BRPM | WEIGHT: 187.9 LBS | DIASTOLIC BLOOD PRESSURE: 68 MMHG | SYSTOLIC BLOOD PRESSURE: 124 MMHG

## 2021-06-04 NOTE — TELEPHONE ENCOUNTER
RN cannot approve Refill Request    RN can NOT refill this medication PCP messaged that patient is overdue for Office Visit and Protocol failed and RN gave three month refill. Last office visit: 4/23/2019 Ziyad Ramos DO Last Physical: 5/13/2019 Last MTM visit: Visit date not found Last visit same specialty: 4/23/2019 Ziyad Ramos DO.  Next visit within 3 mo: Visit date not found  Next physical within 3 mo: Visit date not found  Last OV 5/13/2019 AWV   To RTC about 11/13/2019    Jane Anthony, [facility] Triage/Med Refill 1/5/2020    Requested Prescriptions   Pending Prescriptions Disp Refills     hydroCHLOROthiazide (HYDRODIURIL) 25 MG tablet [Pharmacy Med Name: HYDROCHLOROTHIAZIDE 25 MG TAB] 45 tablet 3     Sig: TAKE 1/2 TABLET BY MOUTH ONCE DAILY       Diuretics/Combination Diuretics Refill Protocol  Passed - 1/3/2020  2:06 AM        Passed - Visit with PCP or prescribing provider visit in past 12 months     Last office visit with prescriber/PCP: 4/23/2019 Ziyad Ramos DO OR same dept: 4/23/2019 Ziyad Ramos DO OR same specialty: 4/23/2019 Ziyad Ramos DO  Last physical: 5/13/2019 Last MTM visit: Visit date not found   Next visit within 3 mo: Visit date not found  Next physical within 3 mo: Visit date not found  Prescriber OR PCP: Ziyad Wilhelm DO  Last diagnosis associated with med order: 1. Essential hypertension  - hydroCHLOROthiazide (HYDRODIURIL) 25 MG tablet [Pharmacy Med Name: HYDROCHLOROTHIAZIDE 25 MG TAB]; TAKE 1/2 TABLET BY MOUTH ONCE DAILY  Dispense: 45 tablet; Refill: 3    If protocol passes may refill for 12 months if within 3 months of last provider visit (or a total of 15 months).             Passed - Serum Potassium in past 12 months      Lab Results   Component Value Date    Potassium 4.6 05/13/2019             Passed - Serum Sodium in past 12 months      Lab Results   Component Value Date     Sodium 134 (L) 05/13/2019             Passed - Blood pressure on file in past 12 months     BP Readings from Last 1 Encounters:   05/13/19 136/66             Passed - Serum Creatinine in past 12 months      Creatinine   Date Value Ref Range Status   05/13/2019 0.83 0.70 - 1.30 mg/dL Final

## 2021-06-05 NOTE — TELEPHONE ENCOUNTER
RN cannot approve Refill Request    RN can NOT refill this medication PCP messaged that patient is overdue for Labs and Office Visit. Last office visit: 4/23/2019 Ziyad Ramos DO Last Physical: 5/13/2019 Last MTM visit: Visit date not found Last visit same specialty: 4/23/2019 Ziyad Ramos DO.  Next visit within 3 mo: Visit date not found  Next physical within 3 mo: Visit date not found      Larry Go, [facility] Triage/Med Refill 1/19/2020    Requested Prescriptions   Pending Prescriptions Disp Refills     ONETOUCH VERIO strips [Pharmacy Med Name: ONE TOUCH VERIO TEST STRIP] 200 strip 0     Sig: USE TO TEST BLOOD SUGAR 1-2 TIMES DAILY.       Diabetic Supplies Refill Protocol Failed - 1/19/2020  1:20 AM        Failed - Visit with PCP or prescribing provider visit in last 6 months     Last office visit with prescriber/PCP: 4/23/2019 Ziyad Ramos DO OR same dept: 4/23/2019 Ziyad Ramos DO OR same specialty: 4/23/2019 Ziyad Ramos DO  Last physical: 5/13/2019 Last MTM visit: Visit date not found   Next visit within 3 mo: Visit date not found  Next physical within 3 mo: Visit date not found  Prescriber OR PCP: Ziyad Wilhelm DO  Last diagnosis associated with med order: 1. Type 2 diabetes mellitus (H)  - ONETOUCH VERIO strips [Pharmacy Med Name: ONE TOUCH VERIO TEST STRIP]; USE TO TEST BLOOD SUGAR 1-2 TIMES DAILY.  Dispense: 200 strip; Refill: 0    If protocol passes may refill for 12 months if within 3 months of last provider visit (or a total of 15 months).             Failed - A1C in last 6 months     Hemoglobin A1c   Date Value Ref Range Status   04/23/2019 7.5 (H) 3.5 - 6.0 % Final

## 2021-06-05 NOTE — TELEPHONE ENCOUNTER
RN cannot approve Refill Request    RN can NOT refill this medication Protocol failed and NO refill given.      Maria Elena Johnson, [facility] Triage/Med Refill 2/1/2020    Requested Prescriptions   Pending Prescriptions Disp Refills     metFORMIN (GLUCOPHAGE) 1000 MG tablet [Pharmacy Med Name: METFORMIN HCL 1,000 MG TABLET] 180 tablet 3     Sig: TAKE 1 TABLET BY MOUTH TWICE A DAY WITH MEALS       Metformin Refill Protocol Failed - 1/31/2020  2:29 AM        Failed - Visit with PCP or prescribing provider visit in last 6 months or next 3 months     Last office visit with prescriber/PCP: Visit date not found OR same dept: 4/23/2019 Ziyad Ramos DO OR same specialty: 4/23/2019 Ziyad Ramos DO Last physical: Visit date not found Last MTM visit: Visit date not found         Next appt within 3 mo: Visit date not found  Next physical within 3 mo: Visit date not found  Prescriber OR PCP: Ziyad Wilhelm DO  Last diagnosis associated with med order: 1. Essential hypertension  - metoprolol succinate (TOPROL-XL) 25 MG; TAKE 1/2 TABLET BY MOUTH 2 TIMES DAILY  Dispense: 90 tablet; Refill: 0    2. Hypothyroidism, unspecified type  - levothyroxine (SYNTHROID, LEVOTHROID) 125 MCG tablet; TAKE 1 TABLET BY MOUTH EVERY DAY  Dispense: 90 tablet; Refill: 0     If protocol passes may refill for 12 months if within 3 months of last provider visit (or a total of 15 months).           Failed - A1C in last 6 months     Hemoglobin A1c   Date Value Ref Range Status   04/23/2019 7.5 (H) 3.5 - 6.0 % Final               Failed - Microalbumin in last year      Microalbumin, Random Urine   Date Value Ref Range Status   01/23/2019 2.77 (H) 0.00 - 1.99 mg/dL Final                  Passed - Blood pressure in last 12 months     BP Readings from Last 1 Encounters:   05/13/19 136/66             Passed - LFT or AST or ALT in last 12 months     Albumin   Date Value Ref Range Status   05/13/2019 3.8 3.5 - 5.0  g/dL Final     Bilirubin, Total   Date Value Ref Range Status   05/13/2019 0.8 0.0 - 1.0 mg/dL Final     Alkaline Phosphatase   Date Value Ref Range Status   05/13/2019 78 45 - 120 U/L Final     AST   Date Value Ref Range Status   05/13/2019 22 0 - 40 U/L Final     ALT   Date Value Ref Range Status   05/13/2019 24 0 - 45 U/L Final     Protein, Total   Date Value Ref Range Status   05/13/2019 6.7 6.0 - 8.0 g/dL Final                Passed - GFR or Serum Creatinine in last 6 months     GFR MDRD Non Af Amer   Date Value Ref Range Status   05/13/2019 >60 >60 mL/min/1.73m2 Final     GFR MDRD Af Amer   Date Value Ref Range Status   05/13/2019 >60 >60 mL/min/1.73m2 Final           Signed Prescriptions Disp Refills    metoprolol succinate (TOPROL-XL) 25 MG 90 tablet 0     Sig: TAKE 1/2 TABLET BY MOUTH 2 TIMES DAILY       Beta-Blockers Refill Protocol Passed - 1/31/2020  2:29 AM        Passed - PCP or prescribing provider visit in past 12 months or next 3 months     Last office visit with prescriber/PCP: 4/23/2019 Ziyad Ramos DO OR same dept: 4/23/2019 Ziyad Ramos DO OR same specialty: 4/23/2019 Ziyad Ramos DO  Last physical: 5/13/2019 Last MTM visit: Visit date not found   Next visit within 3 mo: Visit date not found  Next physical within 3 mo: Visit date not found  Prescriber OR PCP: Ziyad Wilhelm DO  Last diagnosis associated with med order: 1. Essential hypertension  - metoprolol succinate (TOPROL-XL) 25 MG; TAKE 1/2 TABLET BY MOUTH 2 TIMES DAILY  Dispense: 90 tablet; Refill: 0    2. Hypothyroidism, unspecified type  - levothyroxine (SYNTHROID, LEVOTHROID) 125 MCG tablet; TAKE 1 TABLET BY MOUTH EVERY DAY  Dispense: 90 tablet; Refill: 0    If protocol passes may refill for 12 months if within 3 months of last provider visit (or a total of 15 months).             Passed - Blood pressure filed in past 12 months     BP Readings from Last 1 Encounters:    05/13/19 136/66            levothyroxine (SYNTHROID, LEVOTHROID) 125 MCG tablet 90 tablet 0     Sig: TAKE 1 TABLET BY MOUTH EVERY DAY       Thyroid Hormones Protocol Passed - 1/31/2020  2:29 AM        Passed - Provider visit in past 12 months or next 3 months     Last office visit with prescriber/PCP: 4/23/2019 Ziyad Ramos DO OR same dept: 4/23/2019 Ziyad Ramos DO OR same specialty: 4/23/2019 Ziyad Ramos DO  Last physical: 5/13/2019 Last MTM visit: Visit date not found   Next visit within 3 mo: Visit date not found  Next physical within 3 mo: Visit date not found  Prescriber OR PCP: Ziyad Wilhelm DO  Last diagnosis associated with med order: 1. Essential hypertension  - metoprolol succinate (TOPROL-XL) 25 MG; TAKE 1/2 TABLET BY MOUTH 2 TIMES DAILY  Dispense: 90 tablet; Refill: 0    2. Hypothyroidism, unspecified type  - levothyroxine (SYNTHROID, LEVOTHROID) 125 MCG tablet; TAKE 1 TABLET BY MOUTH EVERY DAY  Dispense: 90 tablet; Refill: 0    If protocol passes may refill for 12 months if within 3 months of last provider visit (or a total of 15 months).             Passed - TSH on file in past 12 months for patient age 12 & older     TSH   Date Value Ref Range Status   05/13/2019 1.74 0.30 - 5.00 uIU/mL Final

## 2021-06-05 NOTE — TELEPHONE ENCOUNTER
Refill Approved    Rx renewed per Medication Renewal Policy. Medication was last renewed on 1/23/19.11/30/18.    Maria Elena Johnson, [facility] Triage/Med Refill 2/1/2020     Requested Prescriptions   Pending Prescriptions Disp Refills     metoprolol succinate (TOPROL-XL) 25 MG [Pharmacy Med Name: METOPROLOL SUCC ER 25 MG TAB] 90 tablet 3     Sig: TAKE 1/2 TABLET BY MOUTH 2 TIMES DAILY       Beta-Blockers Refill Protocol Passed - 1/31/2020  2:29 AM        Passed - PCP or prescribing provider visit in past 12 months or next 3 months     Last office visit with prescriber/PCP: 4/23/2019 Ziyad Ramos DO OR same dept: 4/23/2019 Ziyad Ramos DO OR same specialty: 4/23/2019 Ziyad Ramos DO  Last physical: 5/13/2019 Last MTM visit: Visit date not found   Next visit within 3 mo: Visit date not found  Next physical within 3 mo: Visit date not found  Prescriber OR PCP: Ziyad Wilhelm DO  Last diagnosis associated with med order: There are no diagnoses linked to this encounter.  If protocol passes may refill for 12 months if within 3 months of last provider visit (or a total of 15 months).             Passed - Blood pressure filed in past 12 months     BP Readings from Last 1 Encounters:   05/13/19 136/66             levothyroxine (SYNTHROID, LEVOTHROID) 125 MCG tablet [Pharmacy Med Name: LEVOTHYROXINE 125 MCG TABLET] 90 tablet 3     Sig: TAKE 1 TABLET BY MOUTH EVERY DAY       Thyroid Hormones Protocol Passed - 1/31/2020  2:29 AM        Passed - Provider visit in past 12 months or next 3 months     Last office visit with prescriber/PCP: 4/23/2019 Ziyad Ramos DO OR same dept: 4/23/2019 Ziyad Ramos DO OR same specialty: 4/23/2019 Ziyad Ramos DO  Last physical: 5/13/2019 Last MTM visit: Visit date not found   Next visit within 3 mo: Visit date not found  Next physical within 3 mo: Visit date not found  Prescriber  OR PCP: Ziyad Wilhelm DO  Last diagnosis associated with med order: There are no diagnoses linked to this encounter.  If protocol passes may refill for 12 months if within 3 months of last provider visit (or a total of 15 months).             Passed - TSH on file in past 12 months for patient age 12 & older     TSH   Date Value Ref Range Status   05/13/2019 1.74 0.30 - 5.00 uIU/mL Final                   metFORMIN (GLUCOPHAGE) 1000 MG tablet [Pharmacy Med Name: METFORMIN HCL 1,000 MG TABLET] 180 tablet 3     Sig: TAKE 1 TABLET BY MOUTH TWICE A DAY WITH MEALS       Metformin Refill Protocol Failed - 1/31/2020  2:29 AM        Failed - Visit with PCP or prescribing provider visit in last 6 months or next 3 months     Last office visit with prescriber/PCP: Visit date not found OR same dept: 4/23/2019 Ziyad Ramos DO OR same specialty: 4/23/2019 Ziyad Ramos DO Last physical: Visit date not found Last MTM visit: Visit date not found         Next appt within 3 mo: Visit date not found  Next physical within 3 mo: Visit date not found  Prescriber OR PCP: Ziyad Wilhelm DO  Last diagnosis associated with med order: There are no diagnoses linked to this encounter.   If protocol passes may refill for 12 months if within 3 months of last provider visit (or a total of 15 months).           Failed - A1C in last 6 months     Hemoglobin A1c   Date Value Ref Range Status   04/23/2019 7.5 (H) 3.5 - 6.0 % Final               Failed - Microalbumin in last year      Microalbumin, Random Urine   Date Value Ref Range Status   01/23/2019 2.77 (H) 0.00 - 1.99 mg/dL Final                  Passed - Blood pressure in last 12 months     BP Readings from Last 1 Encounters:   05/13/19 136/66             Passed - LFT or AST or ALT in last 12 months     Albumin   Date Value Ref Range Status   05/13/2019 3.8 3.5 - 5.0 g/dL Final     Bilirubin, Total   Date Value Ref Range Status    05/13/2019 0.8 0.0 - 1.0 mg/dL Final     Alkaline Phosphatase   Date Value Ref Range Status   05/13/2019 78 45 - 120 U/L Final     AST   Date Value Ref Range Status   05/13/2019 22 0 - 40 U/L Final     ALT   Date Value Ref Range Status   05/13/2019 24 0 - 45 U/L Final     Protein, Total   Date Value Ref Range Status   05/13/2019 6.7 6.0 - 8.0 g/dL Final                Passed - GFR or Serum Creatinine in last 6 months     GFR MDRD Non Af Amer   Date Value Ref Range Status   05/13/2019 >60 >60 mL/min/1.73m2 Final     GFR MDRD Af Amer   Date Value Ref Range Status   05/13/2019 >60 >60 mL/min/1.73m2 Final

## 2021-06-07 NOTE — TELEPHONE ENCOUNTER
Refill Approved    Rx renewed per Medication Renewal Policy. Medication was last renewed on 1/5/20.    Maria Elena Johnson, [facility] Triage/Med Refill 4/8/2020     Requested Prescriptions   Pending Prescriptions Disp Refills     hydroCHLOROthiazide (HYDRODIURIL) 25 MG tablet [Pharmacy Med Name: HYDROCHLOROTHIAZIDE 25 MG TAB] 45 tablet 0     Sig: PLEASE SEE ATTACHED FOR DETAILED DIRECTIONS       Diuretics/Combination Diuretics Refill Protocol  Passed - 4/6/2020  3:50 PM        Passed - Visit with PCP or prescribing provider visit in past 12 months     Last office visit with prescriber/PCP: 4/23/2019 Ziyad Ramos DO OR same dept: 4/23/2019 Ziyad Ramos DO OR same specialty: 4/23/2019 Ziyad Ramos DO  Last physical: 5/13/2019 Last MTM visit: Visit date not found   Next visit within 3 mo: Visit date not found  Next physical within 3 mo: Visit date not found  Prescriber OR PCP: Ziyad Wilhelm DO  Last diagnosis associated with med order: 1. Essential hypertension  - hydroCHLOROthiazide (HYDRODIURIL) 25 MG tablet [Pharmacy Med Name: HYDROCHLOROTHIAZIDE 25 MG TAB]; PLEASE SEE ATTACHED FOR DETAILED DIRECTIONS  Dispense: 45 tablet; Refill: 0    If protocol passes may refill for 12 months if within 3 months of last provider visit (or a total of 15 months).             Passed - Serum Potassium in past 12 months      Lab Results   Component Value Date    Potassium 4.6 05/13/2019             Passed - Serum Sodium in past 12 months      Lab Results   Component Value Date    Sodium 134 (L) 05/13/2019             Passed - Blood pressure on file in past 12 months     BP Readings from Last 1 Encounters:   05/13/19 136/66             Passed - Serum Creatinine in past 12 months      Creatinine   Date Value Ref Range Status   05/13/2019 0.83 0.70 - 1.30 mg/dL Final

## 2021-06-08 NOTE — TELEPHONE ENCOUNTER
Due to be seen with labs    Rx renewed per Medication Renewal Policy. Medication was last renewed on 2/1/20.    Maria Elena Johnson, [facility] Triage/Med Refill 5/11/2020     Requested Prescriptions   Pending Prescriptions Disp Refills     levothyroxine (SYNTHROID, LEVOTHROID) 125 MCG tablet [Pharmacy Med Name: LEVOTHYROXINE 125 MCG TABLET] 90 tablet 0     Sig: TAKE 1 TABLET BY MOUTH EVERY DAY       Thyroid Hormones Protocol Passed - 5/9/2020 12:23 AM        Passed - Provider visit in past 12 months or next 3 months     Last office visit with prescriber/PCP: 4/23/2019 Ziyad Ramos DO OR same dept: Visit date not found OR same specialty: 4/23/2019 Ziyda Ramos DO  Last physical: 5/13/2019 Last MTM visit: Visit date not found   Next visit within 3 mo: Visit date not found  Next physical within 3 mo: Visit date not found  Prescriber OR PCP: Ziyad Wilhelm DO  Last diagnosis associated with med order: 1. Hypothyroidism, unspecified type  - levothyroxine (SYNTHROID, LEVOTHROID) 125 MCG tablet [Pharmacy Med Name: LEVOTHYROXINE 125 MCG TABLET]; TAKE 1 TABLET BY MOUTH EVERY DAY  Dispense: 90 tablet; Refill: 0    If protocol passes may refill for 12 months if within 3 months of last provider visit (or a total of 15 months).             Passed - TSH on file in past 12 months for patient age 12 & older     TSH   Date Value Ref Range Status   05/13/2019 1.74 0.30 - 5.00 uIU/mL Final

## 2021-06-08 NOTE — TELEPHONE ENCOUNTER
Requested Prescriptions     Pending Prescriptions Disp Refills     blood glucose test (ONETOUCH VERIO TEST STRIPS) strips 200 strip 0     Sig: USE TO TEST BLOOD SUGAR 1-2 TIMES DAILY.

## 2021-06-09 NOTE — TELEPHONE ENCOUNTER
Please contact this patient and help him schedule either a virtual or in clinic appointment for follow-up.  This can be scheduled as a physical exam since he is overdue to be seen.  I refilled the blood pressure medication.

## 2021-06-09 NOTE — TELEPHONE ENCOUNTER
RN cannot approve Refill Request    RN can NOT refill this medication Protocol failed and NO refill given.      Maria Elena Johnson, [facility] Triage/Med Refill 7/16/2020    Requested Prescriptions   Pending Prescriptions Disp Refills     atorvastatin (LIPITOR) 80 MG tablet [Pharmacy Med Name: ATORVASTATIN 80 MG TABLET] 90 tablet 2     Sig: TAKE 1 TABLET BY MOUTH EVERYDAY AT BEDTIME       Statins Refill Protocol (Hmg CoA Reductase Inhibitors) Failed - 7/16/2020 12:36 AM        Failed - PCP or prescribing provider visit in past 12 months      Last office visit with prescriber/PCP: 4/23/2019 Ziyad Ramos DO OR same dept: Visit date not found OR same specialty: 4/23/2019 Ziyad Ramos DO  Last physical: 5/13/2019 Last MTM visit: Visit date not found   Next visit within 3 mo: Visit date not found  Next physical within 3 mo: Visit date not found  Prescriber OR PCP: Ziyad Wilhelm DO  Last diagnosis associated with med order: 1. Other hyperlipidemia  - atorvastatin (LIPITOR) 80 MG tablet [Pharmacy Med Name: ATORVASTATIN 80 MG TABLET]; TAKE 1 TABLET BY MOUTH EVERYDAY AT BEDTIME  Dispense: 90 tablet; Refill: 2    If protocol passes may refill for 12 months if within 3 months of last provider visit (or a total of 15 months).

## 2021-06-09 NOTE — TELEPHONE ENCOUNTER
Called and spoke with patient.  Informed patient that the irbesartan was refilled.  Scheduled AWV on 7/21 at 9:20 am at [facility].    Alyse Mccord CMA

## 2021-06-10 NOTE — PROGRESS NOTES
Assessment and Plan:     1. Routine general medical examination at a health care facility  I encouraged him to stay physically active and to keep eating a healthy diet.  We are going to check fasting labs today.     2. Type 2 diabetes mellitus with microalbuminuria, without long-term current use of insulin (H)  His hemoglobin A1c was 7.5% last month.  I recommended he continue metformin 1000 mg twice daily.     3. Essential hypertension  His blood pressures under good control on metoprolol XL 25 mg daily, hydrochlorothiazide 12.5 mg daily and irbesartan 150 mg daily.  We will be evaluating his kidney function as part of today's labs.    4. Coronary artery disease  This issue has been stable on his current medications.      5. History of heart artery stent  He will continue to follow up with cardiology     6. Pacemaker  Stable     7. Hypothyroidism, unspecified type  Recommended she continue levothyroxine 125 mcg daily.  We are going to check his TSH level again today.  - Thyroid Cascade     8. Other hyperlipidemia  Continue atorvastatin 80 mg daily.  - Comprehensive Metabolic Panel  - Lipid Cascade     9 Left cerumen impaction   I personally removed the impacted cerumen from the left canal using the lighted curette.  He tolerated this well.    The patient's current medical problems were reviewed.      The following health maintenance schedule was reviewed with the patient and provided in printed form in the after visit summary:   Health Maintenance   Topic Date Due     HEPATITIS B VACCINES (1 of 3 - Risk 3-dose series) 07/23/1945     ZOSTER VACCINES (1 of 2) 07/23/1976     A1C  10/23/2019     DIABETIC FOOT EXAM  01/23/2020     MICROALBUMIN  01/23/2020     MEDICARE ANNUAL WELLNESS VISIT  05/13/2020     BMP  05/13/2020     LIPID  05/13/2020     FALL RISK ASSESSMENT  05/13/2020     DIABETIC EYE EXAM  06/07/2020     TD 18+ HE  06/30/2020     INFLUENZA VACCINE RULE BASED (1) 08/01/2020     ADVANCE CARE PLANNING   05/13/2024     PNEUMOCOCCAL IMMUNIZATION 65+ LOW/MEDIUM RISK  Completed        Subjective:   Chief Complaint: Lionel Chávez is an 89 y.o. male here for an Annual Wellness visit.   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  He feels like his medical conditions are stable.  His blood sugars have been running in the low-mid 100s.  Highest lately is low.  He denies hypoglycemic episodes.  His hemoglobin A1c on 4/23/2019 was 7.5%.  He continues to take metformin 1000 mg twice daily.  He denies concerns regarding urination, bowel movements or mood.  He has been trying to stay physically active.  His wife is requiring 24-hour oxygen and he has been helping to take care of her.  He reports tripping over a cord and falling in his bathroom a few days ago.  He bumped his head and left hip.  He has a bruise on the left hip and on the scalp.  He feels like things are healing normally.  He did not lose consciousness.  He is able to walk without difficulty.    Social history: .    Review of Systems:   Please see above.  The rest of the review of systems are negative for all systems.    Patient Care Team:  Ziyad Ramos DO as PCP - General (Family Medicine)  Dexter Salinas MD (Cardiology)  Ziyad Ramos DO as Assigned PCP     Patient Active Problem List   Diagnosis     Type 2 diabetes mellitus (H)     Essential hypertension     Coronary artery disease     Hypothyroidism     History of heart artery stent     Pacemaker     Lumbar radicular pain     Hyperlipidemia     Overweight     No past medical history on file.   No past surgical history on file.   No family history on file.   Social History     Socioeconomic History     Marital status:      Spouse name: Not  on file     Number of children: Not on file     Years of education: Not on file     Highest education level: Not on file   Occupational History     Not on file   Social Needs     Financial resource strain: Not on file     Food insecurity     Worry: Not on file     Inability: Not on file     Transportation needs     Medical: Not on file     Non-medical: Not on file   Tobacco Use     Smoking status: Never Smoker     Smokeless tobacco: Never Used   Substance and Sexual Activity     Alcohol use: No     Drug use: No     Sexual activity: Not on file   Lifestyle     Physical activity     Days per week: Not on file     Minutes per session: Not on file     Stress: Not on file   Relationships     Social connections     Talks on phone: Not on file     Gets together: Not on file     Attends Jainism service: Not on file     Active member of club or organization: Not on file     Attends meetings of clubs or organizations: Not on file     Relationship status: Not on file     Intimate partner violence     Fear of current or ex partner: Not on file     Emotionally abused: Not on file     Physically abused: Not on file     Forced sexual activity: Not on file   Other Topics Concern     Not on file   Social History Narrative     Not on file      Current Outpatient Medications   Medication Sig Dispense Refill     aspirin 81 MG EC tablet Take 81 mg by mouth daily.       atorvastatin (LIPITOR) 80 MG tablet TAKE 1 TABLET BY MOUTH EVERYDAY AT BEDTIME 90 tablet 2     blood glucose test (ONETOUCH VERIO TEST STRIPS) strips USE TO TEST BLOOD SUGAR 1-2 TIMES DAILY. 200 strip 0     FLAXSEED ORAL Take by mouth daily.       hydroCHLOROthiazide (HYDRODIURIL) 25 MG tablet TAKE 1/2 TABLET BY MOUTH EVERY DAY 45 tablet 0     irbesartan (AVAPRO) 150 MG tablet TAKE 1 TABLET BY MOUTH EVERY DAY 90 tablet 3     lancets (ONETOUCH DELICA LANCETS) 30 gauge Misc Check blood glucose daily. 100 each 3     lancets 33 gauge Misc 2 (two) times a day.        "levothyroxine (SYNTHROID, LEVOTHROID) 125 MCG tablet TAKE 1 TABLET BY MOUTH EVERY DAY 90 tablet 3     metFORMIN (GLUCOPHAGE) 1000 MG tablet TAKE 1 TABLET BY MOUTH TWICE A DAY WITH MEALS 180 tablet 3     metoprolol succinate (TOPROL-XL) 25 MG TAKE 1/2 TABLET BY MOUTH 2 TIMES DAILY 90 tablet 0     omega-3 fatty acids-fish oil 340-1,000 mg cap Take 1 capsule by mouth daily.        No current facility-administered medications for this visit.       Objective:   Vital Signs:   Visit Vitals  /68 (Patient Site: Right Arm, Patient Position: Sitting, Cuff Size: Adult Regular)   Pulse 76   Resp 16   Ht 5' 9.06\" (1.754 m)   Wt 187 lb 14.4 oz (85.2 kg)   BMI 27.70 kg/m           VisionScreening:  No exam data present     PHYSICAL EXAM  General Appearance: Alert, cooperative, no distress, appears stated age  Head: There is a bruise with a bump on the scalp.  Eyes: PERRL, conjunctiva/corneas clear, EOM's intact  Ears: The left canal is impacted with cerumen.  Once this was removed the canal and tympanic membrane appeared clear.  The right tympanic membrane is clear.    Nose: Nares normal, septum midline,mucosa normal, no drainage  Throat: Lips, mucosa, and tongue normal; teeth and gums normal  Neck: Supple, symmetrical, trachea midline, no adenopathy;  thyroid: not enlarged, symmetric, no tenderness/mass/nodules  Back: Symmetric, no curvature, ROM normal, no CVA tenderness  Lungs: Clear to auscultation bilaterally, respirations unlabored  Heart: Regular rate and rhythm, S1 and S2 normal, no murmur, rub, or gallop,  Abdomen: Soft, non-tender, bowel sounds active all four quadrants,  no masses, no organomegaly  Musculoskeletal: Normal range of motion. No joint swelling or deformity.   Extremities: Extremities normal, atraumatic, no cyanosis or edema.  There is a large bruise on the left lateral hip.  He is ambulating without difficulty.  Skin: Skin color, texture, turgor normal, no rashes or lesions  Lymph nodes: Cervical, " supraclavicular, and axillary nodes normal  Neurologic: He is alert.  Normal speech.  No focal deficits.  Normal deep tendon reflexes.   Psychiatric: He has a normal mood and affect.       Assessment Results 7/27/2020   Activities of Daily Living No help needed   Instrumental Activities of Daily Living No help needed   Mini Cog Total Score 4   Some recent data might be hidden     A Mini-Cog score of 0-2 suggests the possibility of dementia, score of 3-5 suggests no dementia        Identified Health Risks:     The patient reports that he does not have all recommended working emergency equipment available. He was provided with information about emergency preparedness, including smoke detectors.  The patient was provided with written information regarding signs of hearing loss.  Information on urinary incontinence and treatment options given to patient.  He is at risk for falling and has been provided with information to reduce the risk of falling at home.  Patient's advanced directive was discussed and I am comfortable with the patient's wishes.

## 2021-06-12 NOTE — PROGRESS NOTES
Assessment / Impression     1. Type 2 diabetes mellitus  Glycosylated Hemoglobin A1c    Microalbumin, Random Urine   2. Essential hypertension  Comprehensive Metabolic Panel   3. Coronary artery disease     4. Hypothyroidism  Thyroid Cascade   5. History of heart artery stent     6. Pacemaker     7. Lumbar radicular pain     8. Hyperlipidemia           Plan:     I recommended that we check the above labs and he will be notified of the results when they are available.  He is not due for refills today, but will let me know when he is.  He will continue to follow-up with his cardiologist.  For now he plans to continue working on the PT exercises at home.  He will let me know if he would like a referral to see a spine specialist regarding the lumbar radicular pain symptoms.  I personally reviewed several old records including the CT scan results and lab results from [facility]    Subjective:      HPI: Lionel Chávez is a 89 y.o. male who resents to the clinic to establish care.  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  His last stress echocardiogram was 11/2009 which showed a normal EF and no ischemia.  He follows up closely with his cardiologist Dr. Dexter Salinas at [facility].  His last visit was on 3/1/17.  On 5/8/17 his hemoglobin A1c was 7.2%.,  Potassium 5.1.  On 2/22/17 his LDL was 48.  On 11/21/16 he had a normal ALT, hemogram and TSH.    Overall he is feeling healthy, but he still has issues with low back pain radiating to the left leg.  This is been a problem over the past couple of years.  He saw neurology on 12/13/16 and had an EMG on 1/3/17 which showed chronic L5 radiculopathy, but no evidence of active or ongoing denervation.  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  He had a steroid injection as well as physical therapy which he reports were not helpful.  On 1/26/17 he had a lumbar CT scan (cannot have MRI scans due to pacemaker) which showed severe central spinal canal stenosis at L4-L5 potentially impinging on the L5 nerve roots.          Review of Systems  All other systems reviewed and are negative.     History   Smoking Status     Never Smoker   Smokeless Tobacco     Never Used       No family history on file.    Objective:     /64 (Patient Site: Left Arm, Patient Position: Sitting, Cuff Size: Adult Regular)  Pulse 64  Temp 98.3  F (36.8  C) (Oral)   Resp 16  Ht 5' 8.3\" (1.735 m)  Wt 194 lb 3 oz (88.1 kg)  BMI 29.27 kg/m2  Physical Examination: General appearance - alert, well appearing, and in no distress  Eyes: pupils equal and reactive, extraocular eye movements intact  Mouth: mucous membranes moist, pharynx normal without lesions  Neck: supple, no significant adenopathy  Lungs: clear to auscultation, no wheezes, rales or rhonchi, symmetric air entry  Heart: normal rate, regular rhythm, normal S1, S2, no murmurs, rubs, clicks or gallops  Abdomen: soft, nontender, nondistended, no masses or organomegaly  Neurological: alert, oriented, normal speech, no focal findings or movement disorder noted.    Extremities: No edema, no clubbing or cyanosis  Psychiatric: Normal affect. Does not appear anxious or depressed.    Recent Results (from the past 168 hour(s))   Comprehensive Metabolic Panel   Result Value Ref Range    Sodium 135 (L) 136 - 145 mmol/L    Potassium 4.9 3.5 - 5.0 mmol/L    Chloride 99 98 - 107 mmol/L    CO2 27 22 - 31 mmol/L    Anion Gap, Calculation 9 5 - 18 mmol/L    Glucose 105 70 - 125 mg/dL    BUN 22 8 - 28 mg/dL    Creatinine 0.89 0.70 - 1.30 mg/dL    GFR MDRD Af Amer >60 >60 mL/min/1.73m2    GFR MDRD Non Af Amer >60 >60 mL/min/1.73m2    Bilirubin, " Total 1.0 0.0 - 1.0 mg/dL    Calcium 10.3 8.5 - 10.5 mg/dL    Protein, Total 6.6 6.0 - 8.0 g/dL    Albumin 3.5 3.5 - 5.0 g/dL    Alkaline Phosphatase 94 45 - 120 U/L    AST 19 0 - 40 U/L    ALT 24 0 - 45 U/L   Glycosylated Hemoglobin A1c   Result Value Ref Range    Hemoglobin A1c 7.5 (H) 3.5 - 6.0 %   Microalbumin, Random Urine   Result Value Ref Range    Microalbumin, Random Urine <0.50 0.00 - 1.99 mg/dL    Creatinine, Urine 80.4 mg/dL    Microalbumin/Creatinine Ratio Random Urine  <=19.9 mg/g   Thyroid Cascade   Result Value Ref Range    TSH 1.61 0.30 - 5.00 uIU/mL       Current Outpatient Prescriptions   Medication Sig Note     aspirin 81 MG EC tablet Take 81 mg by mouth daily. 8/29/2017: Received from: [facility] Received Sig: Take 1 tablet by mouth once daily with a meal.     atorvastatin (LIPITOR) 80 MG tablet Take 80 mg by mouth daily. 8/29/2017: Received from: [facility] Received Sig: TAKE ONE TABLET BY MOUTH ONE TIME DAILY     blood glucose test strips 2 (two) times a day. 8/29/2017: [facility] Re[facility] Sig: Test blood sugar 1-2 daily.  Dispense as written.     hydroCHLOROthiazide (HYDRODIURIL) 25 MG tablet Take 12.5 mg by mouth daily. 8/29/2017: Received from: [facility] Received Sig: Take 0.5 tablets by mouth once daily.     irbesartan (AVAPRO) 150 MG tablet Take 150 mg by mouth daily. 8/29/2017: [facility] Received Sig: Take 1 tablet by mouth once daily.     lancets 33 gauge Misc 2 (two) times a day. 8/29/2017: [facility] Received Sig: As directed. Test 1-2 times daily     levothyroxine (SYNTHROID, LEVOTHROID) 125 MCG tablet Take 1 tablet by mouth daily. 8/29/2017: Received from: [facility] Received Sig: TAKE 1 TABLET EVERY  DAY BEFORE BREAKFAST     metFORMIN (GLUCOPHAGE) 500 MG tablet Take 500 mg by mouth 2 (two) times a day. 8/29/2017: Received from: [facility] Received Sig: TAKE 1 TABLET BY MOUTH 2 TIMES DAILY WITH MEALS.     metoprolol succinate (TOPROL-XL) 25 MG Take 12.5 mg by mouth 2 (two) times a day. 8/29/2017: Received from: [facility] Received Sig: TAKE ONE HALF TABLET BY MOUTH TWICE DAILY     multivitamin-minerals-lutein (CENTRUM SILVER) tablet Take 1 tablet by mouth daily. 8/29/2017: Received from: [facility] Received Sig: Take 1 tablet by mouth once daily.     omega-3 fatty acids-fish oil 340-1,000 mg cap Take 2 capsules by mouth daily. 8/29/2017: Received from: [facility] Received Sig: Take 2 capsules by mouth once daily.     vitamins  A,C,E-zinc-copper 7,160-113-100 unit-mg-unit Tab Take 1 capsule by mouth 2 (two) times a day. 8/29/2017: Received from: [facility] Received Sig: Take 2 capsules by mouth once daily.

## 2021-06-14 NOTE — PROGRESS NOTES
Assessment and Plan:     1. Routine general medical examination at a health care facility     2. Encounter for screening for lipoid disorders  Lipid Cascade, FASTING    Lipid Cascade, FASTING   3. Essential hypertension     4. Coronary artery disease     5. Type 2 diabetes mellitus  Glycosylated Hemoglobin A1c   6. Hypothyroidism     7. History of heart artery stent     8. Pacemaker     9. Lumbar radicular pain     10. Other hyperlipidemia  Comprehensive Metabolic Panel   11. Overweight       He appears to be doing well.  I recommended that we check fasting cholesterol panel, CMP and hemoglobin A1c today.  I encouraged him to stay physically active and to eat a healthy diet.  He had a normal TSH level a few months ago.  His blood pressure appears well controlled.  He was given refills of his medications.  I recommended that he follow-up with his cardiologist as scheduled.      The patient's current medical problems were reviewed.    The following high BMI interventions were performed this visit: encouragement to exercise  The following health maintenance schedule was reviewed with the patient and provided in printed form in the after visit summary:   Health Maintenance   Topic Date Due     DIABETES OPHTHALMOLOGY EXAM  07/23/1936     ZOSTER VACCINE  07/23/1986     DIABETES HEMOGLOBIN A1C  02/28/2018     DIABETES FOLLOW-UP  05/22/2018     DIABETES URINE MICROALBUMIN  08/29/2018     DIABETES FOOT EXAM  11/22/2018     FALL RISK ASSESSMENT  11/22/2018     TD 18+ HE  06/30/2020     ADVANCE DIRECTIVES DISCUSSED WITH PATIENT  11/22/2022     PNEUMOCOCCAL POLYSACCHARIDE VACCINE AGE 65 AND OVER  Completed     INFLUENZA VACCINE RULE BASED  Completed     PNEUMOCOCCAL CONJUGATE VACCINE FOR ADULTS (PCV13 OR PREVNAR)  Completed        Subjective:   Chief Complaint: Lionel Chávez is an 89 y.o. male here for an Annual Wellness visit.     HPI:   He denies having concerns regarding bowel movements, urination, sleep or mood.  He  admits that he occasionally wakes up at night to urinate. 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  His last stress echocardiogram was 11/2009 which showed a normal EF and no ischemia.  He follows up closely with his cardiologist Dr. Dexter Sal[facility] at [facility].  His last visit was on 3/1/17.  On 8/29/17 his hemoglobin A1c was 7.5%, TSH, urine microalbumin and CMP were normal with the exception that his sodium was slightly low at 135. On 2/22/17 his LDL was 48.      Overall he is feeling healthy. He has a history of low back pain radiating to the left leg, but this has not been bothering him as much lately.    He has had low back pain over the past couple of years.  He saw neurology on 12/13/16 and had an EMG on 1/3/17 which showed chronic L5 radiculopathy, but no evidence of active or ongoing denervation.  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  He had a steroid injection as well as physical therapy which he reports were not helpful.  On 1/26/17 he had a lumbar CT scan (cannot have MRI scans due to pacemaker) which showed severe central spinal canal stenosis at L4-L5 potentially impinging on the L5 nerve roots.      Review of Systems:  Please see above.  The rest of the review of systems are negative for all systems.    Patient Care Team:  Ziyad Wilhelm DO as PCP - General (Family Medicine)     Patient Active Problem List   Diagnosis     Type 2 diabetes mellitus     Essential hypertension     Coronary artery disease     Hypothyroidism     History of heart artery stent     Pacemaker     Lumbar radicular pain     Hyperlipidemia     Overweight     No past medical history  on file.   No past surgical history on file.   No family history on file.   Social History     Social History     Marital status:      Spouse name: N/A     Number of children: N/A     Years of education: N/A     Occupational History     Not on file.     Social History Main Topics     Smoking status: Never Smoker     Smokeless tobacco: Never Used     Alcohol use No     Drug use: No     Sexual activity: Not on file     Other Topics Concern     Not on file     Social History Narrative      Current Outpatient Prescriptions   Medication Sig Dispense Refill     aspirin 81 MG EC tablet Take 81 mg by mouth daily.       atorvastatin (LIPITOR) 80 MG tablet Take 1 tablet (80 mg total) by mouth at bedtime. 90 tablet 3     FLAXSEED ORAL Take by mouth daily.       hydroCHLOROthiazide (HYDRODIURIL) 25 MG tablet TAKE 1/2 TABLET BY MOUTH ONCE DAILY 45 tablet 3     irbesartan (AVAPRO) 150 MG tablet Take 1 tablet (150 mg total) by mouth daily. 90 tablet 3     lancets 33 gauge Misc 2 (two) times a day.       levothyroxine (SYNTHROID, LEVOTHROID) 125 MCG tablet Take 1 tablet (125 mcg total) by mouth daily. 90 tablet 3     metFORMIN (GLUCOPHAGE) 500 MG tablet Take 1 tablet (500 mg total) by mouth 2 (two) times a day. 180 tablet 3     metoprolol succinate (TOPROL-XL) 25 MG Take 0.5 tablets (12.5 mg total) by mouth 2 (two) times a day. 90 tablet 3     multivitamin-minerals-lutein (CENTRUM SILVER) tablet Take 1 tablet by mouth daily.       omega-3 fatty acids-fish oil 340-1,000 mg cap Take 2 capsules by mouth daily.       ONETOUCH VERIO strips TEST BLOOD SUGAR 1-2 DAILY. 100 strip 3     vitamins  A,C,E-zinc-copper 7,160-113-100 unit-mg-unit Tab Take 1 capsule by mouth 2 (two) times a day. [employer]       No current facility-administered medications for this visit.       Objective:   Vital Signs:   Visit Vitals     /70 (Patient Site: Left Arm, Patient Position: Sitting, Cuff Size: Adult Regular)     Pulse 64     Temp 97.8  F  "(36.6  C) (Oral)     Resp 12     Ht 5' 8.5\" (1.74 m)     Wt 192 lb 5 oz (87.2 kg)     BMI 28.82 kg/m2        VisionScreening:  No exam data present     PHYSICAL EXAM    General Appearance: Alert, cooperative, no distress, appears stated age  Head: Normocephalic, without obvious abnormality, atraumatic  Eyes: PERRL, conjunctiva/corneas clear, EOM's intact  Ears: Normal TM's and external ear canals, both ears  Nose: Nares normal, septum midline,mucosa normal, no drainage  Throat: Lips, mucosa, and tongue normal; teeth and gums normal  Neck: Supple, symmetrical, trachea midline, no adenopathy;  thyroid: not enlarged, symmetric, no tenderness/mass/nodules  Back: Symmetric, no curvature, ROM normal, no CVA tenderness  Lungs: Clear to auscultation bilaterally, respirations unlabored  Heart: Regular rate and rhythm, S1 and S2 normal, no murmur, rub, or gallop,  Abdomen: Soft, non-tender, bowel sounds active all four quadrants,  no masses, no organomegaly  Musculoskeletal: Normal range of motion. No joint swelling or deformity.   Extremities: Extremities normal, atraumatic, no cyanosis or edema  Skin: Skin color, texture, turgor normal, no rashes or lesions  Lymph nodes: Cervical, supraclavicular, and axillary nodes normal  Neurologic: He is alert.  Normal speech.  No focal deficits.  Normal deep tendon reflexes.   Psychiatric: He has a normal mood and affect.     Assessment Results 11/22/2017   Activities of Daily Living No help needed   Instrumental Activities of Daily Living No help needed   Mini Cog Total Score 5   Some recent data might be hidden     A Mini-Cog score of 0-2 suggests the possibility of dementia, score of 3-5 suggests no dementia    Identified Health Risks:     The patient was counseled and encouraged to consider modifying their diet and eating habits. He was provided with information on recommended healthy diet options.  Patient's advanced directive was discussed.        "

## 2021-06-15 NOTE — PROGRESS NOTES
Assessment / Impression     1. Hearing loss  Ambulatory referral to Audiology           Plan:     Discussed with patient normal findings on exam today, and then do not see any cerumen in his ear canals.  If he does note ongoing subjective hearing loss, could consider referral to audiology.  Patient states he would like referral order, he will decide whether he wants to schedule formal appointment.  Follow-up as needed.    Subjective:      HPI: Lionel Chváez is a 89 y.o. male, new to me, who presents for concern about cerumen in his ears.  Patient states for the last few months, he has felt like he has needed his wife to repeat what she is saying more frequently.  He denies any ear pain, tinnitus, or vertigo.  No fevers.  He does not wear hearing aids.      Medical History:     Patient Active Problem List   Diagnosis     Type 2 diabetes mellitus     Essential hypertension     Coronary artery disease     Hypothyroidism     History of heart artery stent     Pacemaker     Lumbar radicular pain     Hyperlipidemia     Overweight       No past medical history on file.    No past surgical history on file.    Current Medications:     Current Outpatient Prescriptions   Medication Sig Note     aspirin 81 MG EC tablet Take 81 mg by mouth daily. 8/29/2017: Received from: [facility] [employer] [facility] Received Sig: Take 1 tablet by mouth once daily with a meal.     atorvastatin (LIPITOR) 80 MG tablet Take 1 tablet (80 mg total) by mouth at bedtime.      FLAXSEED ORAL Take by mouth daily.      hydroCHLOROthiazide (HYDRODIURIL) 25 MG tablet TAKE 1/2 TABLET BY MOUTH ONCE DAILY      irbesartan (AVAPRO) 150 MG tablet Take 1 tablet (150 mg total) by mouth daily.      lancets 33 gauge Misc 2 (two) times a day. 8/29/2017: Received from: [facility] [facility] [employer] [facility] [facility] Received Sig: As directed. Test 1-2 times daily     levothyroxine (SYNTHROID, LEVOTHROID) 125 MCG tablet Take 1 tablet (125 mcg total) by  mouth daily.      metFORMIN (GLUCOPHAGE) 500 MG tablet Take 1 tablet (500 mg total) by mouth 2 (two) times a day.      metoprolol succinate (TOPROL-XL) 25 MG Take 0.5 tablets (12.5 mg total) by mouth 2 (two) times a day.      multivitamin-minerals-lutein (CENTRUM SILVER) tablet Take 1 tablet by mouth daily. 8/29/2017: Received from: [facility] Received Sig: Take 1 tablet by mouth once daily.     omega-3 fatty acids-fish oil 340-1,000 mg cap Take 2 capsules by mouth daily. 8/29/2017: Received from: [facility] Received Sig: Take 2 capsules by mouth once daily.     vitamins  A,C,E-zinc-copper 7,160-113-100 unit-mg-unit Tab Take 1 capsule by mouth 2 (two) times a day. [employer] 8/29/2017: Received from: [facility] Received Sig: Take 2 capsules by mouth once daily.     ONETOUCH VERIO strips TEST BLOOD SUGAR 1-2 DAILY.        Family History:   No family history on file.    Review of Systems  All other systems reviewed and are negative.         Social History:     History   Smoking Status     Never Smoker   Smokeless Tobacco     Never Used     Social History     Social History Narrative         Objective:     /58 (Patient Site: Right Arm, Patient Position: Sitting, Cuff Size: Adult Large)  Pulse 80  Resp 16  Physical Examination: General appearance - alert, well appearing, and in no distress  Eyes: pupils equal and reactive, extraocular eye movements intact  Ears: normal external ears, clear canals,  TMs appear normal, with no redness or bulging noted.  Psychiatric: Normal affect. Does not appear anxious or depressed.    No results found for this or any previous visit (from the past 168 hour(s)).      Imelda Locke MD  1/16/2018  11:11 AM

## 2021-06-16 PROBLEM — Z95.5 HISTORY OF HEART ARTERY STENT: Status: ACTIVE | Noted: 2017-08-29

## 2021-06-16 PROBLEM — E78.5 HYPERLIPIDEMIA: Status: ACTIVE | Noted: 2017-08-29

## 2021-06-16 PROBLEM — I10 ESSENTIAL HYPERTENSION: Status: ACTIVE | Noted: 2017-08-29

## 2021-06-16 PROBLEM — M54.16 LUMBAR RADICULAR PAIN: Status: ACTIVE | Noted: 2017-08-29

## 2021-06-16 PROBLEM — E66.3 OVERWEIGHT: Status: ACTIVE | Noted: 2017-11-22

## 2021-06-16 PROBLEM — Z95.0 PACEMAKER: Status: ACTIVE | Noted: 2017-08-29

## 2021-06-16 NOTE — PROGRESS NOTES
"Audiology Report:    Referring Provider: Imelda Locke MD    History:  Lionel Chávez is seen today for comprehensive hearing evaluation. He reports his wife is concerned with his hearing abilities. She is frustrated by repeating herself. Patient repots he \"does okay\" and \"gets by\" with his hearing. He does request repetitions when in background noise or in the car. He reports his brother and sister used hearing aids. He reports he is a [place]. He denies otalgia, otorrhea, tinnitus, dizziness, a history of ear surgery, or a history of loud noise exposure. He worked as a  and principal.     Results:     Left Ear Right Ear   Otoscopy clear canals clear canals   Pure Tone Audiometry Mild hearing loss from 250-1000 Hz,  sloping to a severe sensorineural hearing loss Mild hearing loss from 250-1500 Hz, sloping to a severe sensorineural hearing loss   Word Recognition good fair   Tympanometry normal (Type A)  shallow (Type As)     Transducer: Insert earphones and Circumaural headphones    Reliability was good  and there was good  SRT to PTA agreement.       Plan:  Results are discussed in detail.  He should return for retesting annually, or with concerns. He is audiometrically a hearing aid candidate bilaterally. He is very hesitant about hearing aids, and feels his wife wants him to have them more than he wants them. [facility]. Provided patient a copy of results and a brochure from the [place] [facility] regarding hearing aids. Informed patient that his hearing test is valid for 6 months if he decides to proceed with a trial period of hearing aids.      Please see audiogram under  media  and  audiogram  in the patient s chart.     Cristian Arnold, CCC-A  [place] Licensed Audiologist #[id number]        "

## 2021-06-17 NOTE — TELEPHONE ENCOUNTER
Telephone Encounter by So Toure CMA at 1/22/2021  1:22 PM     Author: So Toure CMA Service: -- Author Type: Certified Medical Assistant    Filed: 1/22/2021  1:26 PM Encounter Date: 1/22/2021 Status: Signed    : So Toure CMA (Certified Medical Assistant)        FW: E-Prescribed Message Needing Your Attention  Received: Yesterday  Message Contents   Jacob Tellez, Ziyad Care Team Pool             This new rx for blood glucose test (ONETOUCH VERIO TEST STRIPS) strips sent today in the 1.20.21 orders only encounter did not send.   Dr. Wilhelm is no longer at [place], and not able to be an authorizing provider.     Please send a new rx w/ an appropriate, active provider entered for authorizing provider.     Thanks, [facility]    Previous Messages       Teed up Rx - please have covering Provider sign

## 2021-06-17 NOTE — PATIENT INSTRUCTIONS - HE
Patient Instructions by Ziyad Ramos DO at 5/13/2019 11:20 AM     Author: Ziyad Ramos DO Service: -- Author Type: Physician    Filed: 5/13/2019 11:46 AM Encounter Date: 5/13/2019 Status: Signed    : Ziyad Ramos DO (Physician)         Patient Education   Signs of Hearing Loss  Hearing loss is a problem shared by many people. In fact, it is one of the most common health conditions, particularly as people age. Most people over age 65 have some hearing loss, and by age 80, almost everyone does. Because hearing loss usually occurs slowly over the years, you may not realize your hearing ability has gotten worse.       Have your hearing checked  Contact your [facility] care provider if you:    Have to strain to hear normal conversation.    Have to watch other peoples faces very carefully to follow what theyre saying.    Need to ask people to repeat what theyve said.    Often misunderstand what people are saying.    Turn the volume of the television or radio up so high that others complain.    Feel that people are mumbling when theyre talking to you.    Find that the effort to hear leaves you feeling tired and irritated.    Notice, when using the phone, that you hear better with 1 ear than the other.    9191-9358 [employer]. [address] [place] [address], [place] [address]. All rights reserved. This information is not intended as a substitute for professional medical care. Always follow your healthcare professional's instructions.         Patient Education   Urinary Incontinence (Male)    Urinary incontinence means not being able to control the release of urine from the bladder.  Causes  Common causes of urinary incontinence in men include:    Infection    Certain medicines    Aging    Poor pelvic muscle tone    Bladder spasms    Obesity    Urinary retention  Nervous system diseases, diabetes, sleep apnea, urinary tract infections, prostate surgery, and pelvic  trauma can also cause incontinence. Constipation and smoking have also been identified as risk factors.  Symptoms    Urge incontinence (also called overactive bladder) is a sudden urge to urinate even though there may not be much urine in the bladder. The need to urinate often during the night is common. It is due to bladder spasms.    Stress incontinence is involuntary urine leakage that can occur with sneezing, coughing, and other actions that put stress on the bladder.  Treatment  Treatment of urinary incontinence depends on the cause. Infections of the bladder are treated with antibiotics. Urinary retention is treated with a bladder catheter.  Home care  Follow these guidelines when caring for yourself at home:    Don't consume foods and drinks that may irritate the bladder. These include drinks containing alcohol, caffeinate, or carbonation; chocolate; and acidic fruits and juices.    Limit fluid intake to 6 to 8 cups a day.    Lose weight if you are overweight. This will reduce your symptoms.    If needed, wear absorbent pads to catch urine. Change pads frequently to maintain hygiene and prevent skin and bladder infections.    Bathe daily to maintain good hygiene.    If an antibiotic was prescribed to treat a bladder infection, be sure to take it until finished, even if you are feeling better before then. This is to make sure your infection has cleared.    If a catheter was left in place, it is important to keep bacteria from getting into the collection bag. Don't disconnect the catheter from the collection bag.    Use a leg band to secure the catheter drainage tube, so it does not pull on the catheter. Drain the collection bag when it becomes full using the drain spout at the bottom of the bag. Don't disconnect the bag from the catheter.    Don't pull on or try to remove a catheter. The catheter must be removed by a healthcare provider.  Follow-up care  Follow up with your healthcare provider, or as  advised.  When to seek medical advice  Call your healthcare provider right away if any of these occur:    Fever over 100.4 F (38 C), or as directed by your healthcare provider    Bladder pain or fullness    Abdominal swelling, nausea, or vomiting    Back pain    Weakness, dizziness, or fainting    If a catheter was left in place, return if:  ? Catheter falls out  ? Catheter stops draining for 6 hours  Date Last Reviewed: 10/1/2017    3928-5254 The [employer]. [address] [place] [address]. All rights reserved. This information is not intended as a substitute for professional medical care. Always follow your healthcare professional's instructions.       Advance Directive  Patients advance directive was discussed and I am comfortable with the patients wishes.  Patient Education   Personalized Prevention Plan  You are due for the preventive services outlined below.  Your care team is available to assist you in scheduling these services.  If you have already completed any of these items, please share that information with your care team to update in your medical record.  Health Maintenance   Topic Date Due   ? ZOSTER VACCINES (1 of 2) 07/23/1976   ? DIABETES OPHTHALMOLOGY EXAM  05/18/2019   ? DIABETES HEMOGLOBIN A1C  10/23/2019   ? DIABETES FOLLOW-UP  10/23/2019   ? DIABETES FOOT EXAM  01/23/2020   ? DIABETES URINE MICROALBUMIN  01/23/2020   ? FALL RISK ASSESSMENT  01/23/2020   ? TD 18+ HE  06/30/2020   ? ADVANCE DIRECTIVES DISCUSSED WITH PATIENT  11/22/2022   ? PNEUMOCOCCAL POLYSACCHARIDE VACCINE AGE 65 AND OVER  Completed   ? INFLUENZA VACCINE RULE BASED  Completed   ? PNEUMOCOCCAL CONJUGATE VACCINE FOR ADULTS (PCV13 OR PREVNAR)  Completed

## 2021-06-18 NOTE — PATIENT INSTRUCTIONS - HE
Patient Instructions by Ziyad Ramos DO at 7/27/2020 11:20 AM     Author: Ziyad Ramos DO Service: -- Author Type: Physician    Filed: 7/27/2020 11:35 AM Encounter Date: 7/27/2020 Status: Signed    : Ziyad Ramos DO (Physician)         Patient Education    Home Fire Safety  Each year, thousands of people, including children, are injured and killed in home fires. Children are often curious about fire, and may not understand the dangers. This makes home fire safety practices especially important. Three important things you can do to keep your home safe from fire are:    Install smoke alarms in your home and make sure they work properly.    Teach children not to play with matches, lighters, and other materials that can be used to start fires. And keep these materials out of childrens reach.    Teach children what to do in case of fire. Create a fire safety action plan and practice it.  Read on for more details about keeping your family and home safe from fire.        Being Prepared for a Fire  A home fire can happen at any time. The following can help you be prepared:    Install smoke alarms on every level of your home, including the basement and outside all sleeping areas. This simple step cuts your familys risk of dying in a fire nearly in half.    Test smoke alarms monthly, and change the batteries once a year or when the alarm chirps.    Dont disable smoke alarms, even for a short time.    Ask your local fire department for tips on where to place smoke alarms in your home.    Replace all smoke alarms every 10 years.    Consider using voice smoke alarms. These alarms allow you to substitute your own voice for the alarm sound. They are helpful because many children dont wake up to the sound of a regular smoke alarm.    Install carbon monoxide detectors near sleeping areas.    Be aware that carbon monoxide is a byproduct of smoke that can be deadly. Its a gas  that you cant see, smell, or taste.    Consider buying a combination smoke alarm / carbon monoxide detector.    Keep fire extinguishers in the home.    Keep them in accessible locations, especially in the kitchen.    Check usage dates to make sure they are not .    Use fire extinguishers only when the fire is in a contained area and is not spreading. (Otherwise, you should focus on getting out of the home.)    Train adults to use fire extinguishers. (Children should focus on getting out of the home during a fire.)    If you live in an apartment, talk to your landlord about where smoke alarms are and how often they are tested. Also ask about fire extinguisher locations and emergency exit routes.  Indoor Fire Safety  Many things in your home are potential fire hazards. Follow these steps to help keep your home safe.    Be careful in the kitchen.    Never leave food thats cooking unattended.    If a fire breaks out in a cooking pan, put a lid on it to smother it. And never throw water on a grease fire. It will make the fire worse.    Conduct a home safety inspection. Look for anything, such as frayed wires and cords, that can cause a fire. Fix or remove any fire safety hazards you find.    Keep all matches and lighters in a secured drawer or cabinet out of the reach of children. Use childproof lighters.    Check to make sure all appliances, including the stove, are turned off before leaving the home.    Know where the gas main shut-off is located.    Make sure space heaters are stable and have protective covers. Keep them at least 3 feet from anything that can burn, such as curtains. Dont use space heaters in areas where young kids spend time alone.    Keep flammable liquids such as kerosene and gasoline locked up and safely stored away from kids and heat.    Keep all smoking materials out of reach of children. And never smoke in bed. If possible, smoke outdoors only.  Outdoor Fire Safety  Fire can be a hazard  outdoors as well as indoors. When outdoors, be sure to do the following:    Always supervise kids near a barbecue grill, campfire, or portable stove.    Dont use fire pits around children. Kids can fall into them, and pits can be hot even after the fire goes out.    Keep a garden hose or fire extinguisher handy when cooking outdoors in case of fire.  Teaching Your Child About Fire Safety  One of the best ways to keep your home safe from fire is education. Make sure everyone in your family knows fire safety rules, including children.    Teach your children the dangers of matches, lighters, and other dangerous items.    Teach them to never touch these and other objects that are hot, such as candles.    Have them tell you right away whenever they find matches or lighters. Explain that these items are tools for grown-ups, not toys. And never amuse children with matches or lighters.    Round up all matches and lighters and store them safely. In case you missed some, ask your children to tell you where any are located throughout your home.    Never leave a child alone in a room with a lit candle. Dont allow teens to have candles in their rooms.    Show children what to do in case of fire.    Be sure your kids know what the fire alarm sounds like and what to do if it goes off.    Teach kids what to do if their clothes catch fire: Stop, Drop to the ground, and Roll until the fire is put out. They should also cover their face with their hands. Practice these steps with your children. Make sure they understand that running will make the fire burn faster.    Show children how to crawl below smoke during a fire.    Make sure kids know at least two escape routes from each room in the home. These escape routes can be windows.    Teach kids to test doors for nearby fire by feeling for heat with the back of their hand. If the door is warm or hot, they should try their second exit.    Explain to children that they cant hide from a  fire. Hiding in a closet or under a bed wont make them safe. Instead, they should try to escape the home. And if they cant escape, they should let others know they are trapped. They can do this by shutting the door to the room, opening a window, and turning on the lights.    Talk to your local fire department.    Introduce your children to a . Let them know that firefighters will look different when in full protective gear. Tell them to never hide from firefighters, and to follow all directions from firefighters during a fire.    Find out if the fire department has a fire safety program for kids.      Create a Fire Safety Plan  Create a plan for your family to follow in case of a fire. Try making it a family project. Important steps for the plan include leaving the home right away and having a designated meeting place.    Make sure your child understands to get out and stay out. He or she should get out of the home immediately and not go back in, even if family members or pets are still inside.    Decide on a safe meeting place away from the home for everyone to gather.    Teach children to call 911 or emergency services from a cell phone or neighbors phone. Make sure they know to do this only after they are safely out of the home.    Teach your children the fire safety plan. Practice it and make sure they understand it.    Have fire drills twice a year to keep your children prepared in case of fire.    Visit the National Fire Protection Association web site at www.nfpa.org for more information.      4613-0709 [employer]. [address] [place] [address], [place] [address]. All rights reserved. This information is not intended as a substitute for professional medical care. Always follow your healthcare professional's instructions.         Patient Education   Signs of Hearing Loss  Hearing loss is a problem shared by many people. In fact, it is one of the most common health conditions, particularly as  people age. Most people over age 65 have some hearing loss, and by age 80, almost everyone does. Because hearing loss usually occurs slowly over the years, you may not realize your hearing ability has gotten worse.       Have your hearing checked  Contact your [facility] care provider if you:    Have to strain to hear normal conversation.    Have to watch other peoples faces very carefully to follow what theyre saying.    Need to ask people to repeat what theyve said.    Often misunderstand what people are saying.    Turn the volume of the television or radio up so high that others complain.    Feel that people are mumbling when theyre talking to you.    Find that the effort to hear leaves you feeling tired and irritated.    Notice, when using the phone, that you hear better with 1 ear than the other.    4033-4007 [employer]. [address] [place] [address]. All rights reserved. This information is not intended as a substitute for professional medical care. Always follow your healthcare professional's instructions.         Patient Education   Urinary Incontinence (Male)    Urinary incontinence means not being able to control the release of urine from the bladder.  Causes  Common causes of urinary incontinence in men include:    Infection    Certain medicines    Aging    Poor pelvic muscle tone    Bladder spasms    Obesity    Urinary retention  Nervous system diseases, diabetes, sleep apnea, urinary tract infections, prostate surgery, and pelvic trauma can also cause incontinence. Constipation and smoking have also been identified as risk factors.  Symptoms    Urge incontinence (also called overactive bladder) is a sudden urge to urinate even though there may not be much urine in the bladder. The need to urinate often during the night is common. It is due to bladder spasms.    Stress incontinence is involuntary urine leakage that can occur with sneezing, coughing, and other actions that put stress  on the bladder.  Treatment  Treatment of urinary incontinence depends on the cause. Infections of the bladder are treated with antibiotics. Urinary retention is treated with a bladder catheter.  Home care  Follow these guidelines when caring for yourself at home:    Don't consume foods and drinks that may irritate the bladder. These include drinks containing alcohol, caffeinate, or carbonation; chocolate; and acidic fruits and juices.    Limit fluid intake to 6 to 8 cups a day.    Lose weight if you are overweight. This will reduce your symptoms.    If needed, wear absorbent pads to catch urine. Change pads frequently to maintain hygiene and prevent skin and bladder infections.    Bathe daily to maintain good hygiene.    If an antibiotic was prescribed to treat a bladder infection, be sure to take it until finished, even if you are feeling better before then. This is to make sure your infection has cleared.    If a catheter was left in place, it is important to keep bacteria from getting into the collection bag. Don't disconnect the catheter from the collection bag.    Use a leg band to secure the catheter drainage tube, so it does not pull on the catheter. Drain the collection bag when it becomes full using the drain spout at the bottom of the bag. Don't disconnect the bag from the catheter.    Don't pull on or try to remove a catheter. The catheter must be removed by a healthcare provider.  Follow-up care  Follow up with your healthcare provider, or as advised.  When to seek medical advice  Call your healthcare provider right away if any of these occur:    Fever over 100.4 F (38 C), or as directed by your healthcare provider    Bladder pain or fullness    Abdominal swelling, nausea, or vomiting    Back pain    Weakness, dizziness, or fainting    If a catheter was left in place, return if:  ? Catheter falls out  ? Catheter stops draining for 6 hours  Date Last Reviewed: 10/1/2017    8800-4285 [employer],  [employer]. [address] [place] [address]. All rights reserved. This information is not intended as a substitute for professional medical care. Always follow your healthcare professional's instructions.     Patient Education   Preventing Falls in the Home  As you get older, falls are more likely. Thats because your reaction time slows. Your muscles and joints may also get stiffer, making them less flexible. Illness, medications, and vision changes can also affect your balance. A fall could leave you unable to live on your own. To make your home safer, follow these tips:    Floors    Put nonskid pads under area rugs.    Remove throw rugs.    Replace worn floor coverings.    Tack carpets firmly to each step on carpeted stairs. Put nonskid strips on the edges of uncarpeted stairs.    Keep floors and stairs free of clutter and cords.    Arrange furniture so there are clear pathways.    Clean up any spills right away.    Bathrooms    Install grab bars in the tub or shower.    Apply nonskid strips or put a nonskid rubber mat in the tub or shower.    Sit on a bath chair to bathe.    Use bathmats with nonskid backing.    Lighting    Keep a flashlight in each room.    Put a nightlight along the pathway between the bedroom and the bathroom.    1516-6069 [employer]. [address] [place] [address]. All rights reserved. This information is not intended as a substitute for professional medical care. Always follow your healthcare professional's instructions.           Advance Directive  Patients advance directive was discussed and I am comfortable with the patients wishes.  Patient Education   Personalized Prevention Plan  You are due for the preventive services outlined below.  Your care team is available to assist you in scheduling these services.  If you have already completed any of these items, please share that information with your care team to update in your medical record.  [facility]  Maintenance   Topic Date Due   ? HEPATITIS B VACCINES (1 of 3 - Risk 3-dose series) 07/23/1945   ? ZOSTER VACCINES (1 of 2) 07/23/1976   ? A1C  10/23/2019   ? DIABETIC FOOT EXAM  01/23/2020   ? MICROALBUMIN  01/23/2020   ? MEDICARE ANNUAL WELLNESS VISIT  05/13/2020   ? BMP  05/13/2020   ? LIPID  05/13/2020   ? FALL RISK ASSESSMENT  05/13/2020   ? DIABETIC EYE EXAM  06/07/2020   ? TD 18+ HE  06/30/2020   ? INFLUENZA VACCINE RULE BASED (1) 08/01/2020   ? ADVANCE CARE PLANNING  05/13/2024   ? PNEUMOCOCCAL IMMUNIZATION 65+ LOW/MEDIUM RISK  Completed

## 2021-06-18 NOTE — LETTER
Letter by Alie Botello at      Author: Alie Botello Service: -- Author Type: --    Filed:  Encounter Date: 1/30/2019 Status: (Other)       Lionel Chávez  [facility] [address] [address]                 January 30, 2019       Dear Mr. Chávez,    At [facility], we care about your health and well-being. Recently, we received a  referral to get you scheduled at the [facility]. We have attempted to  assist you in scheduling this appointment and have been unsuccessful in reaching you.    Please call our Intake line at your earliest convenience for assistance in scheduling an  appointment. Thank you for choosing [facility].      Sincerely,        [facility] Intake team  [phone]

## 2021-06-18 NOTE — LETTER
Letter by Ziyad Ramos DO at      Author: Ziyad Ramos DO Service: -- Author Type: --    Filed:  Encounter Date: 1/24/2019 Status: (Other)       Lionel Chávez  [address] [address]             January 24, 2019         Dear Mr. Chávez,    Below are the results from your recent visit:    Resulted Orders   Glycosylated Hemoglobin A1c   Result Value Ref Range    Hemoglobin A1c 8.1 (H) 3.5 - 6.0 %   Comprehensive Metabolic Panel   Result Value Ref Range    Sodium 133 (L) 136 - 145 mmol/L    Potassium 4.6 3.5 - 5.0 mmol/L    Chloride 99 98 - 107 mmol/L    CO2 23 22 - 31 mmol/L    Anion Gap, Calculation 11 5 - 18 mmol/L    Glucose 209 (H) 70 - 125 mg/dL    BUN 25 8 - 28 mg/dL    Creatinine 1.02 0.70 - 1.30 mg/dL    GFR MDRD Af Amer >60 >60 mL/min/1.73m2    GFR MDRD Non Af Amer >60 >60 mL/min/1.73m2    Bilirubin, Total 1.2 (H) 0.0 - 1.0 mg/dL    Calcium 9.3 8.5 - 10.5 mg/dL    Protein, Total 6.5 6.0 - 8.0 g/dL    Albumin 3.4 (L) 3.5 - 5.0 g/dL    Alkaline Phosphatase 90 45 - 120 U/L    AST 25 0 - 40 U/L    ALT 32 0 - 45 U/L    Narrative    Fasting Glucose reference range is 70-99 mg/dL per  American Diabetes Association (ADA) guidelines.   Lipid Cascade   Result Value Ref Range    Cholesterol 93 <=199 mg/dL    Triglycerides 77 <=149 mg/dL    HDL Cholesterol 41 >=40 mg/dL    LDL Calculated 37 <=129 mg/dL    Patient Fasting > 8hrs? No    Microalbumin, Random Urine   Result Value Ref Range    Microalbumin, Random Urine 2.77 (H) 0.00 - 1.99 mg/dL    Creatinine, Urine 85.6 mg/dL    Microalbumin/Creatinine Ratio Random Urine 32.4 (H) <=19.9 mg/g    Narrative    Microalbumin, Random Urine  <2.0 mg/dL . . . . . . . . Normal  3.0-30.0 mg/dL . . . . . . Microalbuminuria  >30.0 mg/dL . . . . . .  . Clinical Proteinuria    Microalbumin/Creatinine Ratio, Random Urine  <20 mg/g . . . . .. . . . Normal   mg/g . . . . . . . Microalbuminuria  >300 mg/g . . . . . . . .  Clinical Proteinuria     Thyroid Cascade   Result Value Ref Range    TSH 2.29 0.30 - 5.00 uIU/mL       Your lab results show that your glucose is high and the sodium a little low.  There is also evidence of protein in the urine.  Your thyroid test is normal.  We will keep an eye on these issues at future appointments.  I recommend a 3-month follow-up so that we can see if the blood sugars are coming down.    Please call with questions or contact us using [employer].    Sincerely,        Electronically signed by Ziyad Wilehlm,

## 2021-06-19 NOTE — LETTER
Letter by Ziyad Ramos DO at      Author: Ziyad Ramos DO Service: -- Author Type: --    Filed:  Encounter Date: 5/14/2019 Status: (Other)         Lionel Chávez  [address]  [place] [address]             May 14, 2019         Dear Mr. Chávez,    Below are the results from your recent visit:    Resulted Orders   Comprehensive Metabolic Panel   Result Value Ref Range    Sodium 134 (L) 136 - 145 mmol/L    Potassium 4.6 3.5 - 5.0 mmol/L    Chloride 101 98 - 107 mmol/L    CO2 24 22 - 31 mmol/L    Anion Gap, Calculation 9 5 - 18 mmol/L    Glucose 123 70 - 125 mg/dL    BUN 23 8 - 28 mg/dL    Creatinine 0.83 0.70 - 1.30 mg/dL    GFR MDRD Af Amer >60 >60 mL/min/1.73m2    GFR MDRD Non Af Amer >60 >60 mL/min/1.73m2    Bilirubin, Total 0.8 0.0 - 1.0 mg/dL    Calcium 9.9 8.5 - 10.5 mg/dL    Protein, Total 6.7 6.0 - 8.0 g/dL    Albumin 3.8 3.5 - 5.0 g/dL    Alkaline Phosphatase 78 45 - 120 U/L    AST 22 0 - 40 U/L    ALT 24 0 - 45 U/L    Narrative    Fasting Glucose reference range is 70-99 mg/dL per  American Diabetes Association (ADA) guidelines.   Lipid Cascade   Result Value Ref Range    Cholesterol 114 <=199 mg/dL    Triglycerides 89 <=149 mg/dL    HDL Cholesterol 43 >=40 mg/dL    LDL Calculated 53 <=129 mg/dL    Patient Fasting > 8hrs? Yes    Thyroid Cascade   Result Value Ref Range    TSH 1.74 0.30 - 5.00 uIU/mL       Your labs look great!  Keep up the good work!  Sodium is slightly below the normal range, but this is stable.    Please call with questions or contact us using [employer].    Sincerely,        Electronically signed by Ziyad Wilhelm DO

## 2021-06-20 NOTE — LETTER
Letter by Ziyad Ramos DO at      Author: Ziyad Ramos DO Service: -- Author Type: --    Filed:  Encounter Date: 7/30/2020 Status: (Other)         Lionel Chávez  [address]  [place] [address]             July 30, 2020         Dear Mr. Chávez,    Below are the results from your recent visit:    Resulted Orders   Comprehensive Metabolic Panel   Result Value Ref Range    Sodium 135 (L) 136 - 145 mmol/L    Potassium 4.5 3.5 - 5.0 mmol/L    Chloride 102 98 - 107 mmol/L    CO2 23 22 - 31 mmol/L    Anion Gap, Calculation 10 5 - 18 mmol/L    Glucose 145 (H) 70 - 125 mg/dL    BUN 21 8 - 28 mg/dL    Creatinine 0.90 0.70 - 1.30 mg/dL    GFR MDRD Af Amer >60 >60 mL/min/1.73m2    GFR MDRD Non Af Amer >60 >60 mL/min/1.73m2    Bilirubin, Total 0.8 0.0 - 1.0 mg/dL    Calcium 9.4 8.5 - 10.5 mg/dL    Protein, Total 6.6 6.0 - 8.0 g/dL    Albumin 3.6 3.5 - 5.0 g/dL    Alkaline Phosphatase 81 45 - 120 U/L    AST 19 0 - 40 U/L    ALT 23 0 - 45 U/L    Narrative    Fasting Glucose reference range is 70-99 mg/dL per  American Diabetes Association (ADA) guidelines.   Glycosylated Hemoglobin A1c   Result Value Ref Range    Hemoglobin A1c 6.6 (H) 3.5 - 6.0 %   Lipid Cascade   Result Value Ref Range    Cholesterol 99 <=199 mg/dL    Triglycerides 104 <=149 mg/dL    HDL Cholesterol 39 (L) >=40 mg/dL    LDL Calculated 39 <=129 mg/dL    Patient Fasting > 8hrs? No    Thyroid Cascade   Result Value Ref Range    TSH 0.88 0.30 - 5.00 uIU/mL   Microalbumin, Random Urine   Result Value Ref Range    Microalbumin, Random Urine 1.25 0.00 - 1.99 mg/dL    Creatinine, Urine 74.1 mg/dL    Microalbumin/Creatinine Ratio Random Urine 16.9 <=19.9 mg/g    Narrative    Microalbumin, Random Urine  <2.0 mg/dL . . . . . . . . Normal  3.0-30.0 mg/dL . . . . . . Microalbuminuria  >30.0 mg/dL . . . . . .  . Clinical Proteinuria    Microalbumin/Creatinine Ratio, Random Urine  <20 mg/g . . . . .. . . . Normal   mg/g .  . . . . . . Microalbuminuria  >300 mg/g . . . . . . . . Clinical Proteinuria           Your lab results look very good.  Keep up the good work.    Please call with questions or contact us using [employer].    Sincerely,        Electronically signed by Ziyad Wilhelm, DO

## 2021-06-23 NOTE — TELEPHONE ENCOUNTER
Left message to call back for: Lionel  Information to relay to patient:  Atorvastatin refilled temporarily, needs to make appointment with Dr. Mehdi Edwards, [facility]

## 2021-06-23 NOTE — TELEPHONE ENCOUNTER
RN cannot approve Refill Request    RN can NOT refill this medication Protocol failed and NO refill given. Last office visit: 8/29/2017 Ziyad Ramos DO Last Physical: 11/22/2017 Last MTM visit: Visit date not found Last visit same specialty: 1/16/2018 Imelda Locke MD.  Next visit within 3 mo: Visit date not found  Next physical within 3 mo: Visit date not found      Samara Ortega, [facility] Triage/Med Refill 1/16/2019    Requested Prescriptions   Pending Prescriptions Disp Refills     atorvastatin (LIPITOR) 80 MG tablet [Pharmacy Med Name: ATORVASTATIN 80 MG TABLET] 90 tablet 2     Sig: TAKE 1 TABLET (80 MG TOTAL) BY MOUTH AT BEDTIME.    Statins Refill Protocol (Hmg CoA Reductase Inhibitors) Failed - 1/16/2019  1:52 AM       Failed - PCP or prescribing provider visit in past 12 months     Last office visit with prescriber/PCP: 8/29/2017 Ziyad Ramos DO OR same dept: 1/16/2018 Imelda Locke MD OR same specialty: 1/16/2018 Imelda Locke MD  Last physical: 11/22/2017 Last MTM visit: Visit date not found   Next visit within 3 mo: Visit date not found  Next physical within 3 mo: Visit date not found  Prescriber OR PCP: Ziyad Wilhelm DO  Last diagnosis associated with med order: There are no diagnoses linked to this encounter.  If protocol passes may refill for 12 months if within 3 months of last provider visit (or a total of 15 months).

## 2021-06-23 NOTE — PROGRESS NOTES
Assessment / Impression     1. Type 2 diabetes mellitus (H)  Glycosylated Hemoglobin A1c    Microalbumin, Random Urine   2. Essential hypertension  irbesartan (AVAPRO) 150 MG tablet   3. Other hyperlipidemia  Comprehensive Metabolic Panel    Lipid Cascade   4. Hypothyroidism, unspecified type  Thyroid Cascade   5. History of heart artery stent  irbesartan (AVAPRO) 150 MG tablet   6. Coronary artery disease involving native heart without angina pectoris, unspecified vessel or lesion type     7. Pacemaker  irbesartan (AVAPRO) 150 MG tablet   8. Lumbar radicular pain     9. Coronary artery disease  irbesartan (AVAPRO) 150 MG tablet         Plan:     His hemoglobin A1c came back elevated at 8.1%.  I recommended increasing the metformin to 1000 mg twice daily.  He will continue to monitor his blood glucose levels closely and keep working on getting regular exercise and eating healthy foods.  His blood pressure is under good control on his current medications.  No changes were made.  We are going to check the above labs and he will be notified of the results of they are available.  We can make adjustments to the levothyroxine if necessary based on the TSH results.  He was given a referral to the [facility] spine clinic to further evaluate and discuss treatment options for his chronic low back pain with lumbar radiculopathy symptoms.  I asked him to return to the clinic in 3 months for follow-up.    Subjective:      HPI: Lionel Chávez is a 89 y.o. male who since to the clinic for a follow-up visit.  He has not been seen in over a year.  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  He takes metformin 500 mg twice daily.  On 11/22/17 his hemoglobin A1c was 7.5%, LDL 43, CMP normal with the exception that his  "sodium was slightly low at 135.  He is also on levothyroxine 125 mcg daily.  On 8/29/17 his TSH was normal at 1.61.  He was seen by cardiology on 7/25/18 he did not recommend any further cardiac testing as long as he is asymptomatic.  He feels like his blood sugars have been under fairly good control.  They are typically in the lower 100s when he checks.  Occasionally he has numbers into the 170s.  Denies hypoglycemic episodes.  Feels like he is tolerating his medications well.  He is primarily concerned today about ongoing pain in the low back that is radiating down the left leg.  This is been a problem for him for years.  He is wondering if there is a specialist that could help with this.        Review of Systems  All other systems reviewed and are negative.     Social History     Tobacco Use   Smoking Status Never Smoker   Smokeless Tobacco Never Used       No family history on file.    Objective:     BP 96/56 (Patient Site: Left Arm, Patient Position: Sitting, Cuff Size: Adult Regular)   Pulse 76   Temp 98.2  F (36.8  C) (Oral)   Resp 16   Ht 5' 7.7\" (1.72 m)   Wt 195 lb 3 oz (88.5 kg)   BMI 29.94 kg/m    Physical Examination: General appearance - alert, well appearing, and in no distress  Eyes: pupils equal and reactive, extraocular eye movements intact  Mouth: mucous membranes moist, pharynx normal without lesions  Neck: supple, no significant adenopathy  Lungs: clear to auscultation, no wheezes, rales or rhonchi, symmetric air entry  Heart: normal rate, regular rhythm, normal S1, S2, no murmurs, rubs, clicks or gallops  Abdomen: soft, nontender, nondistended, no masses or organomegaly  Neurological: alert, oriented, normal speech, no focal findings or movement disorder noted.  Deep tendon reflexes 2 out of 4 bilaterally  Back: Nontender of the thoracic spine.  He is tender over the lower lumbar spine and bilateral paraspinal musculature  Extremities: No edema, no clubbing or cyanosis.  Straight leg " raise produces symptoms at 50 degrees on the left.  Psychiatric: Normal affect. Does not appear anxious or depressed.    Recent Results (from the past 168 hour(s))   Glycosylated Hemoglobin A1c   Result Value Ref Range    Hemoglobin A1c 8.1 (H) 3.5 - 6.0 %       Current Outpatient Medications   Medication Sig Note     aspirin 81 MG EC tablet Take 81 mg by mouth daily. 8/29/2017: Received from: [facility] [employer] Received Sig: Take 1 tablet by mouth once daily with a meal.     atorvastatin (LIPITOR) 80 MG tablet TAKE 1 TABLET (80 MG TOTAL) BY MOUTH AT BEDTIME.      FLAXSEED ORAL Take by mouth daily.      hydroCHLOROthiazide (HYDRODIURIL) 25 MG tablet TAKE 1/2 TABLET BY MOUTH ONCE DAILY      irbesartan (AVAPRO) 150 MG tablet Take 1 tablet (150 mg total) by mouth daily.      lancets 33 gauge Misc 2 (two) times a day. 8/29/2017: Received from: [facility] [employer] Received Sig: As directed. Test 1-2 times daily     levothyroxine (SYNTHROID, LEVOTHROID) 125 MCG tablet Take 1 tablet (125 mcg total) by mouth daily.      metoprolol succinate (TOPROL-XL) 25 MG TAKE 1/2 TABLET BY MOUTH 2 TIMES DAILY      multivitamin-minerals-lutein (CENTRUM SILVER) tablet Take 1 tablet by mouth daily. 8/29/2017: Received from: [facility] [employer] Received Sig: Take 1 tablet by mouth once daily.     omega-3 fatty acids-fish oil 340-1,000 mg cap Take 2 capsules by mouth daily. 8/29/2017: Received from: [facility] [employer] Received Sig: Take 2 capsules by mouth once daily.     ONETOUCH VERIO strips TEST BLOOD SUGAR 1-2 TIMES DAILY.      vitamins  A,C,E-zinc-copper 7,160-113-100 unit-mg-unit Tab Take 1 capsule by mouth 2 (two) times a day. Preservision 8/29/2017: Received from: [facility] [employer] Received Sig: Take 2 capsules by mouth once daily.     metFORMIN (GLUCOPHAGE) 1000 MG tablet Take 1 tablet (1,000 mg total) by  mouth 2 (two) times a day with meals.

## 2021-06-23 NOTE — TELEPHONE ENCOUNTER
RN cannot approve Refill Request    RN can NOT refill this medication Protocol failed and RN gave 3 month refill.       Maria Elena Johnson, [facility] Triage/Med Refill 1/11/2019    Requested Prescriptions   Pending Prescriptions Disp Refills     ONETOUCH VERIO strips [Pharmacy Med Name: ONE TOUCH VERIO TEST STRIP] 200 strip 2     Sig: TEST BLOOD SUGAR 1-2 TIMES DAILY.    Diabetic Supplies Refill Protocol Failed - 1/10/2019  2:03 PM       Failed - Visit with PCP or prescribing provider visit in last 6 months    Last office visit with prescriber/PCP: 8/29/2017 Ziyad Ramos DO OR same dept: 1/16/2018 Imelda Locke MD OR same specialty: 1/16/2018 Imelda Locke MD  Last physical: 11/22/2017 Last MTM visit: Visit date not found   Next visit within 3 mo: Visit date not found  Next physical within 3 mo: Visit date not found  Prescriber OR PCP: Ziyad Wilhelm DO  Last diagnosis associated with med order: 1. Type 2 diabetes mellitus (H)  - ONETOUCH VERIO strips [Pharmacy Med Name: ONE TOUCH VERIO TEST STRIP]; TEST BLOOD SUGAR 1-2 TIMES DAILY.  Dispense: 200 strip; Refill: 2    If protocol passes may refill for 12 months if within 3 months of last provider visit (or a total of 15 months).            Failed - A1C in last 6 months    Hemoglobin A1c   Date Value Ref Range Status   11/22/2017 7.5 (H) 3.5 - 6.0 % Final

## 2021-06-23 NOTE — TELEPHONE ENCOUNTER
I can refill his atorvastatin medication, but he needs to schedule follow-up appointment with me. -DE

## 2021-06-23 NOTE — TELEPHONE ENCOUNTER
Reason contacted:  To help schedule a medication F/U appointment   Information relayed:  Pt's spouse, Nuria, answered and left message that the Pt is due for an OV in order to receive any further refills. Nuria states she will have the Pt call back to schedule an OV to F/U on Medication     Additional questions:  No  Further follow-up needed:  No  Okay to leave a detailed message:  Yes

## 2021-07-03 NOTE — ADDENDUM NOTE
Addendum Note by Basia Dickey CMA at 4/8/2020  4:10 PM     Author: Basia Dickey CMA Service: -- Author Type: Certified Medical Assistant    Filed: 4/8/2020  4:10 PM Encounter Date: 4/6/2020 Status: Signed    : Basia Dickey CMA (Certified Medical Assistant)    Addended by: BASIA DICKEY on: 4/8/2020 04:10 PM        Modules accepted: Orders

## 2021-07-03 NOTE — ADDENDUM NOTE
Addendum Note by Alvin Lafleur CMA at 1/20/2021  2:16 PM     Author: Alvin Lafleur CMA Service: -- Author Type: Certified Medical Assistant    Filed: 1/22/2021  1:22 PM Encounter Date: 1/20/2021 Status: Signed    : Alvin Lafleur CMA (Certified Medical Assistant)    Addended by: ALVIN LAFLEUR on: 1/22/2021 01:22 PM        Modules accepted: Orders

## 2022-03-07 ENCOUNTER — HOSPITAL ENCOUNTER (OUTPATIENT)
Facility: HOSPITAL | Age: 87
Setting detail: OBSERVATION
Discharge: HOME | End: 2022-03-10
Attending: STUDENT IN AN ORGANIZED HEALTH CARE EDUCATION/TRAINING PROGRAM | Admitting: FAMILY MEDICINE
Payer: COMMERCIAL

## 2022-03-07 ENCOUNTER — APPOINTMENT (OUTPATIENT)
Dept: CT IMAGING | Facility: HOSPITAL | Age: 87
End: 2022-03-07
Attending: STUDENT IN AN ORGANIZED HEALTH CARE EDUCATION/TRAINING PROGRAM
Payer: COMMERCIAL

## 2022-03-07 ENCOUNTER — APPOINTMENT (OUTPATIENT)
Dept: RADIOLOGY | Facility: HOSPITAL | Age: 87
End: 2022-03-07
Attending: FAMILY MEDICINE
Payer: COMMERCIAL

## 2022-03-07 DIAGNOSIS — I16.0 ASYMPTOMATIC HYPERTENSIVE URGENCY: Primary | ICD-10-CM

## 2022-03-07 DIAGNOSIS — M54.16 LUMBAR RADICULAR PAIN: ICD-10-CM

## 2022-03-07 DIAGNOSIS — M62.81 GENERALIZED MUSCLE WEAKNESS: ICD-10-CM

## 2022-03-07 DIAGNOSIS — R26.81 UNSTEADY GAIT: ICD-10-CM

## 2022-03-07 DIAGNOSIS — Z95.0 PACEMAKER: ICD-10-CM

## 2022-03-07 DIAGNOSIS — R62.7 ADULT FAILURE TO THRIVE: ICD-10-CM

## 2022-03-07 DIAGNOSIS — D64.9 NORMOCYTIC ANEMIA: ICD-10-CM

## 2022-03-07 DIAGNOSIS — E11.9 TYPE 2 DIABETES MELLITUS WITHOUT COMPLICATION, WITHOUT LONG-TERM CURRENT USE OF INSULIN (H): ICD-10-CM

## 2022-03-07 PROBLEM — F39 MOOD DISORDER (H): Status: ACTIVE | Noted: 2022-03-07

## 2022-03-07 PROBLEM — D72.819 LEUKOPENIA: Status: ACTIVE | Noted: 2022-03-07

## 2022-03-07 PROBLEM — I25.10 CAD (CORONARY ARTERY DISEASE): Status: ACTIVE | Noted: 2017-08-29

## 2022-03-07 PROBLEM — E87.1 HYPONATREMIA: Status: ACTIVE | Noted: 2022-03-07

## 2022-03-07 PROBLEM — R55 PRE-SYNCOPE: Status: ACTIVE | Noted: 2022-03-07

## 2022-03-07 PROBLEM — N40.0 BPH (BENIGN PROSTATIC HYPERPLASIA): Status: ACTIVE | Noted: 2022-03-07

## 2022-03-07 PROBLEM — E03.9 HYPOTHYROIDISM: Status: ACTIVE | Noted: 2017-08-29

## 2022-03-07 LAB
ALBUMIN SERPL-MCNC: 3.5 G/DL (ref 3.5–5)
ALBUMIN UR-MCNC: 10 MG/DL
ALP SERPL-CCNC: 105 U/L (ref 45–120)
ALT SERPL W P-5'-P-CCNC: 35 U/L (ref 0–45)
ANION GAP SERPL CALCULATED.3IONS-SCNC: 5 MMOL/L (ref 5–18)
APPEARANCE UR: CLEAR
AST SERPL W P-5'-P-CCNC: 25 U/L (ref 0–40)
BACTERIA #/AREA URNS HPF: ABNORMAL /HPF
BILIRUB SERPL-MCNC: 0.7 MG/DL (ref 0–1)
BILIRUB UR QL STRIP: NEGATIVE
BUN SERPL-MCNC: 20 MG/DL (ref 8–28)
CALCIUM SERPL-MCNC: 9 MG/DL (ref 8.5–10.5)
CHLORIDE BLD-SCNC: 102 MMOL/L (ref 98–107)
CO2 SERPL-SCNC: 25 MMOL/L (ref 22–31)
COLOR UR AUTO: ABNORMAL
CORTIS SERPL-MCNC: 9.8 UG/DL
CREAT SERPL-MCNC: 0.81 MG/DL (ref 0.7–1.3)
ERYTHROCYTE [DISTWIDTH] IN BLOOD BY AUTOMATED COUNT: 14 % (ref 10–15)
GFR SERPL CREATININE-BSD FRML MDRD: 81 ML/MIN/1.73M2
GLUCOSE BLD-MCNC: 162 MG/DL (ref 70–125)
GLUCOSE BLDC GLUCOMTR-MCNC: 142 MG/DL (ref 70–99)
GLUCOSE BLDC GLUCOMTR-MCNC: 153 MG/DL (ref 70–99)
GLUCOSE UR STRIP-MCNC: NEGATIVE MG/DL
HBA1C MFR BLD: 7.2 %
HCT VFR BLD AUTO: 34.2 % (ref 40–53)
HGB BLD-MCNC: 11.1 G/DL (ref 13.3–17.7)
HGB UR QL STRIP: NEGATIVE
KETONES UR STRIP-MCNC: NEGATIVE MG/DL
LACTATE SERPL-SCNC: 0.9 MMOL/L (ref 0.7–2)
LEUKOCYTE ESTERASE UR QL STRIP: NEGATIVE
MCH RBC QN AUTO: 30.2 PG (ref 26.5–33)
MCHC RBC AUTO-ENTMCNC: 32.5 G/DL (ref 31.5–36.5)
MCV RBC AUTO: 93 FL (ref 78–100)
MUCOUS THREADS #/AREA URNS LPF: PRESENT /LPF
NITRATE UR QL: NEGATIVE
OSMOLALITY UR: 693 MOSM/KG (ref 300–900)
PH UR STRIP: 6.5 [PH] (ref 5–7)
PLATELET # BLD AUTO: 169 10E3/UL (ref 150–450)
POTASSIUM BLD-SCNC: 4.6 MMOL/L (ref 3.5–5)
PROT SERPL-MCNC: 7.1 G/DL (ref 6–8)
RBC # BLD AUTO: 3.67 10E6/UL (ref 4.4–5.9)
RBC URINE: <1 /HPF
SODIUM SERPL-SCNC: 132 MMOL/L (ref 136–145)
SODIUM UR-SCNC: 116 MMOL/L
SP GR UR STRIP: 1.02 (ref 1–1.03)
SQUAMOUS EPITHELIAL: <1 /HPF
TROPONIN I SERPL-MCNC: 0.09 NG/ML (ref 0–0.29)
TSH SERPL DL<=0.005 MIU/L-ACNC: 0.92 UIU/ML (ref 0.3–5)
UROBILINOGEN UR STRIP-MCNC: <2 MG/DL
WBC # BLD AUTO: 3.1 10E3/UL (ref 4–11)
WBC URINE: <1 /HPF

## 2022-03-07 PROCEDURE — 250N000011 HC RX IP 250 OP 636: Performed by: FAMILY MEDICINE

## 2022-03-07 PROCEDURE — 84443 ASSAY THYROID STIM HORMONE: CPT | Performed by: FAMILY MEDICINE

## 2022-03-07 PROCEDURE — 84425 ASSAY OF VITAMIN B-1: CPT | Performed by: FAMILY MEDICINE

## 2022-03-07 PROCEDURE — 84484 ASSAY OF TROPONIN QUANT: CPT | Performed by: STUDENT IN AN ORGANIZED HEALTH CARE EDUCATION/TRAINING PROGRAM

## 2022-03-07 PROCEDURE — 82533 TOTAL CORTISOL: CPT | Performed by: FAMILY MEDICINE

## 2022-03-07 PROCEDURE — 83036 HEMOGLOBIN GLYCOSYLATED A1C: CPT | Performed by: FAMILY MEDICINE

## 2022-03-07 PROCEDURE — 96374 THER/PROPH/DIAG INJ IV PUSH: CPT

## 2022-03-07 PROCEDURE — G0378 HOSPITAL OBSERVATION PER HR: HCPCS

## 2022-03-07 PROCEDURE — 250N000013 HC RX MED GY IP 250 OP 250 PS 637: Performed by: FAMILY MEDICINE

## 2022-03-07 PROCEDURE — 99220 PR INITIAL OBSERVATION CARE,LEVEL III: CPT | Performed by: FAMILY MEDICINE

## 2022-03-07 PROCEDURE — 36415 COLL VENOUS BLD VENIPUNCTURE: CPT | Performed by: STUDENT IN AN ORGANIZED HEALTH CARE EDUCATION/TRAINING PROGRAM

## 2022-03-07 PROCEDURE — 250N000012 HC RX MED GY IP 250 OP 636 PS 637: Performed by: FAMILY MEDICINE

## 2022-03-07 PROCEDURE — 84484 ASSAY OF TROPONIN QUANT: CPT | Performed by: EMERGENCY MEDICINE

## 2022-03-07 PROCEDURE — 93005 ELECTROCARDIOGRAM TRACING: CPT | Performed by: EMERGENCY MEDICINE

## 2022-03-07 PROCEDURE — 85027 COMPLETE CBC AUTOMATED: CPT | Performed by: EMERGENCY MEDICINE

## 2022-03-07 PROCEDURE — 250N000013 HC RX MED GY IP 250 OP 250 PS 637: Performed by: STUDENT IN AN ORGANIZED HEALTH CARE EDUCATION/TRAINING PROGRAM

## 2022-03-07 PROCEDURE — 83605 ASSAY OF LACTIC ACID: CPT | Performed by: EMERGENCY MEDICINE

## 2022-03-07 PROCEDURE — 70498 CT ANGIOGRAPHY NECK: CPT

## 2022-03-07 PROCEDURE — 83935 ASSAY OF URINE OSMOLALITY: CPT | Performed by: FAMILY MEDICINE

## 2022-03-07 PROCEDURE — 82962 GLUCOSE BLOOD TEST: CPT

## 2022-03-07 PROCEDURE — 36415 COLL VENOUS BLD VENIPUNCTURE: CPT | Performed by: FAMILY MEDICINE

## 2022-03-07 PROCEDURE — 96372 THER/PROPH/DIAG INJ SC/IM: CPT | Performed by: FAMILY MEDICINE

## 2022-03-07 PROCEDURE — 80053 COMPREHEN METABOLIC PANEL: CPT | Performed by: EMERGENCY MEDICINE

## 2022-03-07 PROCEDURE — 82607 VITAMIN B-12: CPT | Performed by: FAMILY MEDICINE

## 2022-03-07 PROCEDURE — 87635 SARS-COV-2 COVID-19 AMP PRB: CPT | Performed by: FAMILY MEDICINE

## 2022-03-07 PROCEDURE — 84484 ASSAY OF TROPONIN QUANT: CPT | Performed by: FAMILY MEDICINE

## 2022-03-07 PROCEDURE — 70496 CT ANGIOGRAPHY HEAD: CPT

## 2022-03-07 PROCEDURE — 36415 COLL VENOUS BLD VENIPUNCTURE: CPT | Performed by: EMERGENCY MEDICINE

## 2022-03-07 PROCEDURE — 81001 URINALYSIS AUTO W/SCOPE: CPT | Performed by: EMERGENCY MEDICINE

## 2022-03-07 PROCEDURE — 96372 THER/PROPH/DIAG INJ SC/IM: CPT | Mod: XU | Performed by: FAMILY MEDICINE

## 2022-03-07 PROCEDURE — 99285 EMERGENCY DEPT VISIT HI MDM: CPT | Mod: 25

## 2022-03-07 PROCEDURE — 71100 X-RAY EXAM RIBS UNI 2 VIEWS: CPT | Mod: RT

## 2022-03-07 PROCEDURE — C9803 HOPD COVID-19 SPEC COLLECT: HCPCS

## 2022-03-07 PROCEDURE — 250N000011 HC RX IP 250 OP 636: Performed by: STUDENT IN AN ORGANIZED HEALTH CARE EDUCATION/TRAINING PROGRAM

## 2022-03-07 PROCEDURE — 84300 ASSAY OF URINE SODIUM: CPT | Performed by: FAMILY MEDICINE

## 2022-03-07 PROCEDURE — 250N000009 HC RX 250: Performed by: FAMILY MEDICINE

## 2022-03-07 PROCEDURE — 73030 X-RAY EXAM OF SHOULDER: CPT | Mod: RT

## 2022-03-07 RX ORDER — VIT C/E/ZN/COPPR/LUTEIN/ZEAXAN 60 MG-6 MG
1 CAPSULE ORAL 2 TIMES DAILY
COMMUNITY

## 2022-03-07 RX ORDER — LANOLIN ALCOHOL/MO/W.PET/CERES
3 CREAM (GRAM) TOPICAL
Status: DISCONTINUED | OUTPATIENT
Start: 2022-03-07 | End: 2022-03-10

## 2022-03-07 RX ORDER — PROCHLORPERAZINE 25 MG
12.5 SUPPOSITORY, RECTAL RECTAL EVERY 12 HOURS PRN
Status: DISCONTINUED | OUTPATIENT
Start: 2022-03-07 | End: 2022-03-10 | Stop reason: HOSPADM

## 2022-03-07 RX ORDER — AMOXICILLIN 250 MG
2 CAPSULE ORAL 2 TIMES DAILY PRN
Status: DISCONTINUED | OUTPATIENT
Start: 2022-03-07 | End: 2022-03-10 | Stop reason: HOSPADM

## 2022-03-07 RX ORDER — IOPAMIDOL 755 MG/ML
75 INJECTION, SOLUTION INTRAVASCULAR ONCE
Status: COMPLETED | OUTPATIENT
Start: 2022-03-07 | End: 2022-03-07

## 2022-03-07 RX ORDER — METHOCARBAMOL 500 MG/1
500 TABLET, FILM COATED ORAL 2 TIMES DAILY PRN
Status: ON HOLD | COMMUNITY
End: 2022-03-10

## 2022-03-07 RX ORDER — PROCHLORPERAZINE MALEATE 5 MG
5 TABLET ORAL EVERY 6 HOURS PRN
Status: DISCONTINUED | OUTPATIENT
Start: 2022-03-07 | End: 2022-03-10 | Stop reason: HOSPADM

## 2022-03-07 RX ORDER — ATORVASTATIN CALCIUM 40 MG/1
80 TABLET, FILM COATED ORAL AT BEDTIME
Status: DISCONTINUED | OUTPATIENT
Start: 2022-03-07 | End: 2022-03-10 | Stop reason: HOSPADM

## 2022-03-07 RX ORDER — ASPIRIN 81 MG/1
81 TABLET ORAL DAILY
Status: DISCONTINUED | OUTPATIENT
Start: 2022-03-08 | End: 2022-03-10 | Stop reason: HOSPADM

## 2022-03-07 RX ORDER — IRBESARTAN 150 MG/1
150 TABLET ORAL DAILY
Status: DISCONTINUED | OUTPATIENT
Start: 2022-03-08 | End: 2022-03-09

## 2022-03-07 RX ORDER — POLYETHYLENE GLYCOL 3350 17 G/17G
17 POWDER, FOR SOLUTION ORAL DAILY
Status: DISCONTINUED | OUTPATIENT
Start: 2022-03-08 | End: 2022-03-10 | Stop reason: HOSPADM

## 2022-03-07 RX ORDER — ACETAMINOPHEN 650 MG/1
650 SUPPOSITORY RECTAL EVERY 6 HOURS PRN
Status: DISCONTINUED | OUTPATIENT
Start: 2022-03-07 | End: 2022-03-10 | Stop reason: HOSPADM

## 2022-03-07 RX ORDER — MIRTAZAPINE 7.5 MG/1
7.5 TABLET, FILM COATED ORAL AT BEDTIME
COMMUNITY

## 2022-03-07 RX ORDER — DEXTROSE MONOHYDRATE 25 G/50ML
25-50 INJECTION, SOLUTION INTRAVENOUS
Status: DISCONTINUED | OUTPATIENT
Start: 2022-03-07 | End: 2022-03-10 | Stop reason: HOSPADM

## 2022-03-07 RX ORDER — NICOTINE POLACRILEX 4 MG
15-30 LOZENGE BUCCAL
Status: DISCONTINUED | OUTPATIENT
Start: 2022-03-07 | End: 2022-03-10 | Stop reason: HOSPADM

## 2022-03-07 RX ORDER — TRAZODONE HYDROCHLORIDE 50 MG/1
50 TABLET, FILM COATED ORAL AT BEDTIME
Status: DISCONTINUED | OUTPATIENT
Start: 2022-03-07 | End: 2022-03-10 | Stop reason: HOSPADM

## 2022-03-07 RX ORDER — AMOXICILLIN 250 MG
1 CAPSULE ORAL 2 TIMES DAILY PRN
Status: DISCONTINUED | OUTPATIENT
Start: 2022-03-07 | End: 2022-03-10 | Stop reason: HOSPADM

## 2022-03-07 RX ORDER — TRAZODONE HYDROCHLORIDE 50 MG/1
50 TABLET, FILM COATED ORAL AT BEDTIME
COMMUNITY

## 2022-03-07 RX ORDER — LIDOCAINE 50 MG/G
OINTMENT TOPICAL 4 TIMES DAILY
Status: DISCONTINUED | OUTPATIENT
Start: 2022-03-07 | End: 2022-03-10 | Stop reason: HOSPADM

## 2022-03-07 RX ORDER — ACETAMINOPHEN 325 MG/1
650 TABLET ORAL EVERY 6 HOURS PRN
Status: DISCONTINUED | OUTPATIENT
Start: 2022-03-07 | End: 2022-03-10 | Stop reason: HOSPADM

## 2022-03-07 RX ORDER — TAMSULOSIN HYDROCHLORIDE 0.4 MG/1
0.4 CAPSULE ORAL DAILY
Status: DISCONTINUED | OUTPATIENT
Start: 2022-03-08 | End: 2022-03-10 | Stop reason: HOSPADM

## 2022-03-07 RX ORDER — HYDRALAZINE HYDROCHLORIDE 20 MG/ML
10 INJECTION INTRAMUSCULAR; INTRAVENOUS EVERY 4 HOURS PRN
Status: DISCONTINUED | OUTPATIENT
Start: 2022-03-07 | End: 2022-03-10 | Stop reason: HOSPADM

## 2022-03-07 RX ORDER — LEVOTHYROXINE SODIUM 125 UG/1
125 TABLET ORAL DAILY
Status: DISCONTINUED | OUTPATIENT
Start: 2022-03-08 | End: 2022-03-10 | Stop reason: HOSPADM

## 2022-03-07 RX ORDER — MULTIVIT WITH MINERALS/LUTEIN
1 TABLET ORAL DAILY
COMMUNITY

## 2022-03-07 RX ORDER — MIRTAZAPINE 7.5 MG/1
7.5 TABLET, FILM COATED ORAL AT BEDTIME
Status: DISCONTINUED | OUTPATIENT
Start: 2022-03-07 | End: 2022-03-10 | Stop reason: HOSPADM

## 2022-03-07 RX ORDER — TAMSULOSIN HYDROCHLORIDE 0.4 MG/1
0.4 CAPSULE ORAL DAILY
COMMUNITY

## 2022-03-07 RX ORDER — LORAZEPAM 0.5 MG/1
1 TABLET ORAL ONCE
Status: COMPLETED | OUTPATIENT
Start: 2022-03-07 | End: 2022-03-07

## 2022-03-07 RX ADMIN — IOPAMIDOL 75 ML: 755 INJECTION, SOLUTION INTRAVENOUS at 13:53

## 2022-03-07 RX ADMIN — MIRTAZAPINE 7.5 MG: 7.5 TABLET, FILM COATED ORAL at 22:26

## 2022-03-07 RX ADMIN — INSULIN ASPART 1 UNITS: 100 INJECTION, SOLUTION INTRAVENOUS; SUBCUTANEOUS at 18:21

## 2022-03-07 RX ADMIN — TRAZODONE HYDROCHLORIDE 50 MG: 50 TABLET ORAL at 22:25

## 2022-03-07 RX ADMIN — METOPROLOL SUCCINATE 12.5 MG: 25 TABLET, EXTENDED RELEASE ORAL at 22:26

## 2022-03-07 RX ADMIN — ENOXAPARIN SODIUM 40 MG: 40 INJECTION SUBCUTANEOUS at 22:25

## 2022-03-07 RX ADMIN — HYDRALAZINE HYDROCHLORIDE 10 MG: 20 INJECTION INTRAMUSCULAR; INTRAVENOUS at 20:39

## 2022-03-07 RX ADMIN — ATORVASTATIN CALCIUM 80 MG: 40 TABLET, FILM COATED ORAL at 22:26

## 2022-03-07 RX ADMIN — LORAZEPAM 1 MG: 0.5 TABLET ORAL at 13:22

## 2022-03-07 RX ADMIN — LIDOCAINE: 50 OINTMENT TOPICAL at 18:21

## 2022-03-07 NOTE — ED PROVIDER NOTES
ED Provider In Triage Note  [facility]  Encounter Date: Mar 7, 2022    Chief Complaint   Patient presents with     Near Syncope       Brief HPI:   Lionel Chávez is a 89 year old male presenting to the Emergency Department with a chief complaint of weakness. Onset early this AM. Slight HA    Brief Physical Exam:  There were no vitals taken for this visit.  General: Non-toxic appearing  HEENT: Atraumatic  Resp: No respiratory distress  Abdomen: Non-peritoneal  Neuro: Alert, oriented, answers questions appropriately  Psych: Behavior appropriate      Plan Initiated in Triage:  Orders Placed This Encounter     Head CT w/o contrast     Comprehensive metabolic panel     Lactic acid whole blood     UA with Microscopic reflex to Culture     Troponin I     CBC (+ platelets, no diff)       PIT Dispo:   Main ED    Geovany Galvez MD on 3/7/2022 at 11:42 AM    Patient was evaluated by the Physician in Triage due to a limitation of available rooms in the Emergency Department. A plan of care was discussed based on the information obtained on the initial evaluation and patient was consuled to return back to the Emergency Department lobby after this initial evalutaiton until results were obtained or a room became available in the Emergency Department. Patient was counseled not to leave prior to receiving the results of their workup.      Geovany Galvez MD  03/07/22 1142

## 2022-03-07 NOTE — ED TRIAGE NOTES
Pt arrives with family from clinic after pt had near syncope/ generalized weakness starting 0800 this morning, pt seen in clinic and sent over for neuro assessment. Pt seen by provider in triage and no stroke code called. Pt also reporting headache after arrival. A&0 x4.

## 2022-03-07 NOTE — ED NOTES
Pt placed an order for dinner. Pt's visitor also wanted let us know that the pt has broken ribs recently and easily felt uncomfortable certain positions.

## 2022-03-07 NOTE — ED NOTES
Pt was moved to the restroom via wheelchair and complained of burping and feeling hungry. Pt got rapidly nauseous and vomited in bathroom. Pt was given a vomit bag.

## 2022-03-07 NOTE — ED NOTES
Patient ambulated to bathroom, was very unsteady on his feet; while in the bathroom patient began to fall backwards, writer was able to use gait belt to steady patient.  MD valle

## 2022-03-07 NOTE — ED PROVIDER NOTES
"  Emergency Department Encounter         FINAL IMPRESSION:          ED COURSE AND MEDICAL DECISION MAKING       ED Course as of 03/11/22 1219   Mon Mar 07, 2022   1243 Patient is a 89-year-old male with a history of hypertension hyperlipidemia, here from home/urgent care after a imbalance versus presyncopal event that happened this morning.  Around 8:00 this morning patient states he got up from bed, felt weak and tired and felt like \"my body did not work\" and then went to the bathroom.  Urinated successfully, and then went back to bed.  States while he was climbing back into bed he lost control of his body and he fell into the bed.  Did not lose consciousness.  History is difficult as patient essentially goes back and forth between dizziness versus presyncope.  He denies any chest pain or trouble breathing.  No abdominal pain.  States he felt mildly nauseous during the event.  No shortness of breath.  On arrival here his NIH is 0.  He is hypertensive.  Otherwise complaining of a mild headache.  No fevers.  His heart and lungs are normal.  His abdomen is benign.  Unsure what caused his symptoms.  He does not look toxic now.  Plan for labs, MRI to rule out posterior cerebellar stroke and reevaluate.  Patient did not want to stay in the hospital.   1326 Patient's pacemaker is not compatible with MRI technology.  We will obtain CTA       -Patient signed out oncoming physician with a follow-up of imaging.        12:41 PM I met with patient for initial interview and encounter. PPE worn includes surgical mask.                           At the conclusion of the encounter I discussed the results of all the tests and the disposition. The questions were answered. The patient or family acknowledged understanding and was agreeable with the care plan.                  MEDICATIONS GIVEN IN THE EMERGENCY DEPARTMENT:  Medications - No data to display    NEW PRESCRIPTIONS STARTED AT TODAY'S ED VISIT:  New Prescriptions    No " "medications on file       HPI     Patient information obtained from: Patient     Use of Interpretor: N/A     Lionel Chávez is a 89 year old male with a pertinent history of DM type II without insulin, CAD, HTN, HLD who presents to this ED via walk-in for evaluation of near syncope, weakness.    Patient reports he had sudden onset of diffuse weakness, dizziness and feeling off balance while getting out of bed to use the bathroom earlier this morning. States he was able to use the bathroom normally and while climbing back into bed he \"lost use of his body\" and fell onto the bed. Denies any LOC. Notes mild nausea with this that has resolved. Patient does currently endorse a headache and burping in the ED. Patient's daughter does note that patient has a history of frequent falls. Patient denies any chest pain, abdominal pain, vomiting, or any other complaints.        REVIEW OF SYSTEMS:  Review of Systems   Constitutional: Negative for fever, malaise  HEENT: Negative runny nose, sore throat, ear pain, neck pain  Respiratory: Negative for shortness of breath, cough, congestion  Cardiovascular: Negative for chest pain, leg edema  Gastrointestinal: Negative for abdominal distention, abdominal pain, constipation, vomiting, nausea, diarrhea  Genitourinary: Negative for dysuria and hematuria.   Integument: Negative for rash, skin breakdown  Neurological: Positive for weakness, dizziness/presyncope, headache. Negative for paresthesias  Musculoskeletal: Negative for joint pain, joint swelling      All other systems reviewed and are negative.          MEDICAL HISTORY     History reviewed. No pertinent past medical history.    History reviewed. No pertinent surgical history.    Social History     Tobacco Use     Smoking status: Never Smoker     Smokeless tobacco: Never Used   Substance Use Topics     Alcohol use: No     Drug use: No       aspirin 81 MG EC tablet  atorvastatin (LIPITOR) 80 MG tablet  blood glucose test " (ONETOUCH VERIO TEST STRIPS) strips  FLAXSEED ORAL  hydroCHLOROthiazide (HYDRODIURIL) 25 MG tablet  irbesartan (AVAPRO) 150 MG tablet  lancets (ONETOUCH DELICA LANCETS) 30 gauge Misc  lancets 33 gauge Misc  levothyroxine (SYNTHROID, LEVOTHROID) 125 MCG tablet  metFORMIN (GLUCOPHAGE) 1000 MG tablet  metoprolol succinate (TOPROL-XL) 25 MG  omega-3 fatty acids-fish oil 340-1,000 mg cap            PHYSICAL EXAM     BP (!) 205/119   Pulse 60   Temp (!) 96.7  F (35.9  C) (Tympanic)   Resp 18   Wt 81.6 kg (180 lb)   SpO2 97%   BMI 26.54 kg/m        PHYSICAL EXAM:     General: Patient appears well, nontoxic, comfortable  HEENT: Moist mucous membranes, no tongue swelling.  No head trauma.  No midline neck pain.  Cardiovascular: Normal rate, normal rhythm, no extremity edema.  No appreciable murmur.  Respiratory: No signs of respiratory distress, lungs are clear to auscultation bilaterally with no wheezes rhonchi or rales.  Abdominal: Soft, nontender, nondistended, no palpable masses, no guarding, no rebound  Musculoskeletal: Full range of motion of joints, no deformities appreciated.  Neurological: Alert and oriented, +5 strength UE/LE, normal finger to nose, , gross sensation intact throughout, CN II-12 intact grossly, no difficulty with ambulation, no slurring of words, no word finding difficulty    Psychological: Normal affect and mood.  Integument: No rashes appreciated          RESULTS       Labs Ordered and Resulted from Time of ED Arrival to Time of ED Departure   COMPREHENSIVE METABOLIC PANEL - Abnormal       Result Value    Sodium 132 (*)     Potassium 4.6      Chloride 102      Carbon Dioxide (CO2) 25      Anion Gap 5      Urea Nitrogen 20      Creatinine 0.81      Calcium 9.0      Glucose 162 (*)     Alkaline Phosphatase 105      AST 25      ALT 35      Protein Total 7.1      Albumin 3.5      Bilirubin Total 0.7      GFR Estimate 81     ROUTINE UA WITH MICROSCOPIC REFLEX TO CULTURE - Abnormal    Color Urine  Light Yellow      Appearance Urine Clear      Glucose Urine Negative      Bilirubin Urine Negative      Ketones Urine Negative      Specific Gravity Urine 1.024      Blood Urine Negative      pH Urine 6.5      Protein Albumin Urine 10  (*)     Urobilinogen Urine <2.0      Nitrite Urine Negative      Leukocyte Esterase Urine Negative      Bacteria Urine Few (*)     Mucus Urine Present (*)     RBC Urine <1      WBC Urine <1      Squamous Epithelials Urine <1     CBC WITH PLATELETS - Abnormal    WBC Count 3.1 (*)     RBC Count 3.67 (*)     Hemoglobin 11.1 (*)     Hematocrit 34.2 (*)     MCV 93      MCH 30.2      MCHC 32.5      RDW 14.0      Platelet Count 169     GLUCOSE BY METER - Abnormal    GLUCOSE BY METER POCT 142 (*)    HEMOGLOBIN A1C - Abnormal    Hemoglobin A1C 7.2 (*)    GLUCOSE BY METER - Abnormal    GLUCOSE BY METER POCT 153 (*)    LACTIC ACID WHOLE BLOOD - Normal    Lactic Acid 0.9     TROPONIN I - Normal    Troponin I 0.09     TROPONIN I - Normal    Troponin I 0.09     TSH - Normal    TSH 0.92     CORTISOL    Cortisol 9.8     SODIUM RANDOM URINE    Sodium Urine mmol/L 116     VITAMIN B1 WHOLE BLOOD       Head CT w/o contrast    (Results Pending)   MR Brain w/o Contrast    (Results Pending)                     PROCEDURES:  Procedures:  Procedures       I, Onur Zapata am serving as a scribe to document services personally performed by Cornell Blanco DO, based on my observations and the provider's statements to me.  I, Cornell Blanco DO, attest that Onur Zapata is acting in a scribe capacity, has observed my performance of the services and has documented them in accordance with my direction.    Cornell Blanco DO  Emergency Medicine  [facility] EMERGENCY DEPARTMENT     Cornell lBanco DO  03/11/22 1221

## 2022-03-07 NOTE — H&P
Admission History & Physical  Lionel Chávez, 7/23/1926, [id number]    [facility]  [unfilled]  Ziyad Ramos, [phone]   Code Status:  Full Code     Extended Emergency Contact Information  Primary Emergency Contact: James Chávez   [facility]  Home Phone: [phone]  Relation: Spouse  Secondary Emergency Contact: REBECCA SYLVESTER  Home Phone: [phone]  Mobile Phone: [phone]  Relation: Daughter     Assessment and Plan:   89-year-old male with CAD, HTN, DM 2 presented with presyncopal episode and generalized weakness and adult failure to thrive, mild hyponatremia.    Active Problems:    Type 2 diabetes mellitus (H)    CAD (coronary artery disease)    Hypothyroidism    Unsteady gait    Generalized muscle weakness    Pre-syncope    Adult failure to thrive    Asymptomatic hypertensive urgency    Mood disorder (H)    Hyponatremia    BPH (benign prostatic hyperplasia)    Leukopenia    Normocytic anemia    Present on Admission:    Unsteady gait    Generalized muscle weakness    Type 2 diabetes mellitus (H)    Hypothyroidism      Presyncope  -Patient endorsed some weakness and presyncope at home but denied any loss of consciousness or hitting his head.  CTA head and neck without intracranial hemorrhage, mass, acute infarct but with mild diffuse cerebral parenchymal disease and chronic hypertensive/microvascular ischemic changes.  No high-grade stenosis of head or neck.  No signs of infection, mild anemia present, blood glucose level normal, mild hyponatremia but unlikely to cause syncope.  Patient endorsed sharp right shoulder/radiating arm pain prior to becoming dizzy so highly suspect pain/vasovagal response.  -Telemetry  -Trend troponin x3  -TTE  -Hold home Robaxin  -Pacemaker interrogation  -Orthostatic Bps    HTN urgency  -Hold home hydrochlorothiazide given low sodium  -Resume home irbesartan  -Resume home metoprolol  -Hydralazine as needed    Adult failure to  thrive  -Patient with weakness, inability to care for himself at home.  Leukopenia and mild normocytic anemia, only mild hyponatremia with 132, no signs of infection in urine.  Doubt infectious or cardiac etiology  -Check thiamine, B12, folate, TSH  -PT/OT  -Fall precautions    Right shoulder pain, rib pain  -Patient with fall and right rib fractures approximately 1 month ago with continued discomfort and some acute pain in his right shoulder.  Likely continued musculoskeletal pain and tightness especially in the right supraspinatus, deltoids muscle that may be causing some pressure on brachial plexus.  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  Troponin negative x2, EKG with LBBB and paced.  Not sure if LBBB is new as no prior EKGs are able to be viewed.  Chest pain-free  -ASA 81 mg p.o. daily  -Atorvastatin 80 mg p.o. daily  -Metoprolol XL 12.5 mg p.o. twice daily    Hypothermia  -External warming    DM 2  -A1c 6.8  -Hold home Metformin, in fact would not continue at discharge as patient 89 years old with very well controlled diabetes  -Sensitive scale aspart insulin 3 times daily AC as needed  -Accu-Chek 3 times daily AC at bedtime  -Hypoglycemic protocol    Mood disorder  -Mirtazapine 7.5 mg p.o. q. Bedtime    BPH  -Tamsulosin 0.4 mg p.o. daily    Hypothyroidism  -TSH 1.55, will recheck given hyponatremia and hypothermia    Hyponatremia  -Mild, likely hypovolemic hyponatremia in the setting of adult failure to thrive.  Hold home HCTZ  -Check TSH, cortisol, urine sodium and osmole  -Hold off on IV fluids for now as patient with severe range blood pressures    Bicytopenia  -Leukopenia with WBC 3.1, normocytic anemia with hemoglobin 11.1 and MCV of 93.  No prior CBC to compare to so unclear if this is chronic or new.      Clinically Significant Risk Factors  Present on Admission         # Hyponatremia: Na = 132 mmol/L (Ref range: 136 - 145 mmol/L) on admission, will monitor as appropriate       # Platelet Defect: home medication list includes an antiplatelet medication   # Diabetes, type II: last A1C 7.2 % (Ref range: <=5.6 %)        Electrolytes: Sodium 132  Fluids: P.o.  Diet: Cardiac, diabetic  VTE prophylaxis: Lovenox      COVID19 symptom check 3/7/2022  Fevers/Chills/myalgias: NEGATIVE TO ALL  Sick contacts/COVID19 exposure: NEGATIVE TO ALL  Cough/trouble breathing/SOB/sore throat: NEGATIVE TO ALL  Diarrhea: NEGATIVE    Discussed CODE STATUS with patient and his daughter and they would both prefer that he remain full code at this time      Chief Complaint: weakness     HPI:    Lionel Chávez is a 89 year old old male with CAD, recent rib fractures, DM2 presents with dizziness and presyncope.  He endorses the morning of 3/7 he was using his right hand to help get himself up in bed when he felt a sharp radiating pain from the shoulder down to the hand.  He immediately began to feel dizzy and a little bit weak but was able to get up and go to the bathroom with the assistance of a cane.  As soon as he got back into bed he endorsed he lost all of his strength and immediately laid down.  Denied hitting his head or loss of consciousness.  Also denied any nausea, vomiting, dysuria, diarrhea, chest pain, fevers, chills, difficulty breathing.  Patient does endorse he is having ongoing right-sided rib pain from recent fractures.  Denied ever having felt like this before having the sharp shooting pain in his right shoulder and arm.    History is provided by patient and his daughter Jose Manuel at bedside    Medical History  Present on Admission:    Unsteady gait    Generalized muscle weakness    Type 2 diabetes mellitus (H)    Hypothyroidism     Past Medical History:   Diagnosis Date     CAD (coronary artery disease)      DM2 (diabetes mellitus, type 2) (H)      Patient Active  "Problem List   Diagnosis     Type 2 diabetes mellitus (H)     Essential hypertension     CAD (coronary artery disease)     Hypothyroidism     History of heart artery stent     Pacemaker     Lumbar radicular pain     Hyperlipidemia     Overweight     Unsteady gait     Generalized muscle weakness     Pre-syncope     Adult failure to thrive     Asymptomatic hypertensive urgency     Mood disorder (H)     Hyponatremia     BPH (benign prostatic hyperplasia)     Leukopenia     Normocytic anemia     Surgical History  He  has a past surgical history that includes Nasal Septoplasty W/ Turbinoplasty; Implantable Cardioverter-Defibrillator (ICD); and Cataract Extraction.     Past Surgical History:   Procedure Laterality Date     CATARACT EXTRACTION       EP ICD       NASAL SEPTOPLASTY W/ TURBINOPLASTY      Social History  Reviewed, and he  reports that he has never smoked. He has never used smokeless tobacco. He reports that he does not drink alcohol and does not use drugs.  Social History     Tobacco Use     Smoking status: Never Smoker     Smokeless tobacco: Never Used   Substance Use Topics     Alcohol use: No      Allergies  Allergies   Allergen Reactions     Amiodarone Other (See Comments)     Per [facility] records \"eye changes & thyroid gland side effects\"    Family History  Reviewed, and family history includes Michael-Danlos syndrome in his daughter; Heart Disease in his brother; Hyperlipidemia in his sister.   Psychosocial Needs  Social History     Social History Narrative     Not on file     Additional psychosocial needs reviewed per nursing assessment.       Prior to Admission Medications   (Not in a hospital admission)    Prior to Admission Medications   Prescriptions Last Dose Informant Patient Reported? Taking?   aspirin 81 MG EC tablet 3/6/2022 at am  Yes Yes   Sig: [ASPIRIN 81 MG EC TABLET] Take 81 mg by mouth daily.   atorvastatin (LIPITOR) 80 MG tablet 3/6/2022 at pm  No Yes   Sig: [ATORVASTATIN (LIPITOR) " 80 MG TABLET] TAKE 1 TABLET BY MOUTH EVERYDAY AT BEDTIME   Patient taking differently: Take 80 mg by mouth At Bedtime    blood glucose test (ONETOUCH VERIO TEST STRIPS) strips   No No   Sig: [BLOOD GLUCOSE TEST (ONETOUCH VERIO TEST STRIPS) STRIPS] USE TO TEST BLOOD SUGAR 1-2 TIMES DAILY.   hydroCHLOROthiazide (HYDRODIURIL) 25 MG tablet 3/6/2022 at am  No Yes   Sig: [HYDROCHLOROTHIAZIDE (HYDRODIURIL) 25 MG TABLET] Take 0.5 tablets (12.5 mg total) by mouth daily.   irbesartan (AVAPRO) 150 MG tablet 3/6/2022 at am  No Yes   Sig: [IRBESARTAN (AVAPRO) 150 MG TABLET] TAKE 1 TABLET BY MOUTH EVERY DAY   Patient taking differently: Take 150 mg by mouth daily    lancets (ONETOUCH DELICA LANCETS) 30 gauge Misc   No No   Sig: [LANCETS (ONETOUCH DELICA LANCETS) 30 GAUGE MISC] Check blood glucose daily.   lancets 33 gauge Misc   Yes No   Sig: [LANCETS 33 GAUGE MISC] 2 (two) times a day.   levothyroxine (SYNTHROID, LEVOTHROID) 125 MCG tablet 3/7/2022 at am  No Yes   Sig: [LEVOTHYROXINE (SYNTHROID, LEVOTHROID) 125 MCG TABLET] TAKE 1 TABLET BY MOUTH EVERY DAY   Patient taking differently: Take 125 mcg by mouth daily    metFORMIN (GLUCOPHAGE) 1000 MG tablet 3/6/2022 at pm  No Yes   Sig: [METFORMIN (GLUCOPHAGE) 1000 MG TABLET] TAKE 1 TABLET BY MOUTH TWICE A DAY WITH MEALS   Patient taking differently: Take 1,000 mg by mouth 2 times daily (with meals)    methocarbamol (ROBAXIN) 500 MG tablet Unknown at Unknown time  Yes Yes   Sig: Take 500 mg by mouth 2 times daily as needed for muscle spasms   metoprolol succinate (TOPROL-XL) 25 MG 3/6/2022 at pm  No Yes   Sig: [METOPROLOL SUCCINATE (TOPROL-XL) 25 MG] TAKE 1/2 TABLET BY MOUTH 2 TIMES DAILY   Patient taking differently: Take 12.5 mg by mouth 2 times daily    mirtazapine (REMERON) 7.5 MG tablet 3/6/2022 at pm  Yes Yes   Sig: Take 7.5 mg by mouth At Bedtime   multivitamin  with lutein (OCUVITE WITH LUTEIN) CAPS per capsule 3/6/2022 at am  Yes Yes   Sig: Take 2 capsules by mouth daily    multivitamin (CENTRUM SILVER) tablet 3/6/2022 at am  Yes Yes   Sig: Take 1 tablet by mouth daily   omega-3 fatty acids-fish oil 340-1,000 mg cap 3/6/2022 at am  Yes Yes   Sig: [OMEGA-3 FATTY ACIDS-FISH -1,000 MG CAP] Take 1 capsule by mouth daily.    tamsulosin (FLOMAX) 0.4 MG capsule 3/6/2022 at am  Yes Yes   Sig: Take 0.4 mg by mouth daily   traZODone (DESYREL) 50 MG tablet 3/6/2022 at pm  Yes Yes   Sig: Take 50 mg by mouth At Bedtime      Facility-Administered Medications: None               Review of Systems: History obtained from the patient  General ROS: negative  for  - chills, fever, positive for fatigue  ENT ROS: negative for - headaches, hearing change, nasal congestion, nasal discharge  Hematological and Lymphatic ROS: negative for - bleeding problems, blood clots, bruising  Respiratory ROS: negative for - cough, pleuritic pain, shortness of breath  Cardiovascular ROS: negative for - chest pain, dyspnea on exertion, edema  Gastrointestinal ROS: negative for - abdominal pain, constipation, diarrhea, and nausea/vomiting  Genito-Urinary ROS: negative for -  dysuria, hematuria  Musculoskeletal ROS: Positive for right shoulder and rib pain  Neurological ROS: Positive for some mild dizziness but negative for any numbness, tingling  Dermatological ROS: negative for pruritus, rash    BP (!) 202/89   Pulse 60   Temp (!) 96.7  F (35.9  C) (Tympanic)   Resp 18   Wt 81.6 kg (180 lb)   SpO2 96%   BMI 26.54 kg/m      Physical Examination:   General appearance - alert, well appearing, and in no distress and oriented to person, place, and time  Mental status - alert, oriented to person, place, and time, normal mood, behavior, speech, dress, motor activity, and thought processes  HEENT - sclera anicteric, left eye normal, right eye normal, nares normal and patent, no erythema  Lymphatics - no palpable lymphadenopathy, no hepatosplenomegaly  Respiratory - clear to auscultation, no wheezes, rales or rhonchi,  symmetric air entry, no tachypnea, retractions or cyanosis  Cardiac - normal rate, regular rhythm, normal S1, S2, no murmurs, rubs, clicks or gallops, no JVD  Abdomen - soft, nontender, nondistended, no masses or organomegaly no rebound tenderness noted bowel sounds normal, no bladder distension noted  Neurological - alert, oriented, normal speech, no focal findings or movement disorder noted  Musculoskeletal -right shoulder without any glenohumeral tenderness, no right AC joint tenderness or tenderness across the deltoids, biceps, elbow joint, wrist joint.  Patient with some mild shooting pain with resistance to active supination.  Extremities - peripheral pulses normal, right lower extremity pitting edema worse on left than right, no clubbing or cyanosis  Skin - normal coloration and turgor, no rashes, no suspicious skin lesions noted    Results:  Recent Results (from the past 24 hour(s))   Glucose by meter    Collection Time: 03/07/22 11:41 AM   Result Value Ref Range    GLUCOSE BY METER POCT 142 (H) 70 - 99 mg/dL   Comprehensive metabolic panel    Collection Time: 03/07/22 12:01 PM   Result Value Ref Range    Sodium 132 (L) 136 - 145 mmol/L    Potassium 4.6 3.5 - 5.0 mmol/L    Chloride 102 98 - 107 mmol/L    Carbon Dioxide (CO2) 25 22 - 31 mmol/L    Anion Gap 5 5 - 18 mmol/L    Urea Nitrogen 20 8 - 28 mg/dL    Creatinine 0.81 0.70 - 1.30 mg/dL    Calcium 9.0 8.5 - 10.5 mg/dL    Glucose 162 (H) 70 - 125 mg/dL    Alkaline Phosphatase 105 45 - 120 U/L    AST 25 0 - 40 U/L    ALT 35 0 - 45 U/L    Protein Total 7.1 6.0 - 8.0 g/dL    Albumin 3.5 3.5 - 5.0 g/dL    Bilirubin Total 0.7 0.0 - 1.0 mg/dL    GFR Estimate 81 >60 mL/min/1.73m2   Lactic acid whole blood    Collection Time: 03/07/22 12:01 PM   Result Value Ref Range    Lactic Acid 0.9 0.7 - 2.0 mmol/L   Troponin I    Collection Time: 03/07/22 12:01 PM   Result Value Ref Range    Troponin I 0.09 0.00 - 0.29 ng/mL   CBC (+ platelets, no diff)    Collection Time:  03/07/22 12:01 PM   Result Value Ref Range    WBC Count 3.1 (L) 4.0 - 11.0 10e3/uL    RBC Count 3.67 (L) 4.40 - 5.90 10e6/uL    Hemoglobin 11.1 (L) 13.3 - 17.7 g/dL    Hematocrit 34.2 (L) 40.0 - 53.0 %    MCV 93 78 - 100 fL    MCH 30.2 26.5 - 33.0 pg    MCHC 32.5 31.5 - 36.5 g/dL    RDW 14.0 10.0 - 15.0 %    Platelet Count 169 150 - 450 10e3/uL   Hemoglobin A1c    Collection Time: 03/07/22 12:01 PM   Result Value Ref Range    Hemoglobin A1C 7.2 (H) <=5.6 %   UA with Microscopic reflex to Culture    Collection Time: 03/07/22  1:22 PM    Specimen: Urine, Midstream   Result Value Ref Range    Color Urine Light Yellow Colorless, Straw, Light Yellow, Yellow    Appearance Urine Clear Clear    Glucose Urine Negative Negative mg/dL    Bilirubin Urine Negative Negative    Ketones Urine Negative Negative mg/dL    Specific Gravity Urine 1.024 1.001 - 1.030    Blood Urine Negative Negative    pH Urine 6.5 5.0 - 7.0    Protein Albumin Urine 10  (A) Negative mg/dL    Urobilinogen Urine <2.0 <2.0 mg/dL    Nitrite Urine Negative Negative    Leukocyte Esterase Urine Negative Negative    Bacteria Urine Few (A) None Seen /HPF    Mucus Urine Present (A) None Seen /LPF    RBC Urine <1 <=2 /HPF    WBC Urine <1 <=5 /HPF    Squamous Epithelials Urine <1 <=1 /HPF   Sodium random urine    Collection Time: 03/07/22  1:22 PM   Result Value Ref Range    Sodium Urine mmol/L 116 mmol/L   Troponin I (now)    Collection Time: 03/07/22  2:42 PM   Result Value Ref Range    Troponin I 0.09 0.00 - 0.29 ng/mL   TSH    Collection Time: 03/07/22  2:42 PM   Result Value Ref Range    TSH 0.92 0.30 - 5.00 uIU/mL   Cortisol    Collection Time: 03/07/22  2:42 PM   Result Value Ref Range    Cortisol 9.8 ug/dL   Glucose by meter    Collection Time: 03/07/22  5:47 PM   Result Value Ref Range    GLUCOSE BY METER POCT 153 (H) 70 - 99 mg/dL     IMPRESSION:   HEAD CT:  1.  No intracranial hemorrhage, mass lesions, hydrocephalus or CT evidence for an acute  infarct.  2.  Mild diffuse cerebral parenchymal volume loss. Presumed chronic hypertensive/microvascular ischemic white matter changes.  3.  Mild to moderate chronic mucosal thickening of the maxillary sinuses, the inferior frontal sinuses and ethmoid air cells.      HEAD CTA:   1.  No high-grade stenoses or occlusion of the major intracranial arteries. No intracranial aneurysms.     NECK CTA:  1.  No hemodynamically significant narrowing of the internal carotid arteries bilaterally based on the NASCET criteria.  2.  The vertebral arteries are patent throughout their course in the neck.    Lab Results personally reviewed 3/7  Imaging Results personally reviewed 3/7  Discussed with patient and his daughter  Reviewed old records   Discharge Planning discussed with patient and his daughter  Discussed care with patient and his daughter for 70 minutes with greater than 50% of time spent in counseling and coordination of care.    Shanta Villafuerte DO  Hospitalist Service  [facility]  Text page via [facility] Paging/Directory     This note was created using dragon dictation, any spelling and grammatical errors are unintentional.

## 2022-03-07 NOTE — PHARMACY-ADMISSION MEDICATION HISTORY
Pharmacy Note - Admission Medication History       ______________________________________________________________________    Prior To Admission (PTA) med list completed and updated in EMR.       PTA Med List   Medication Sig Last Dose     aspirin 81 MG EC tablet [ASPIRIN 81 MG EC TABLET] Take 81 mg by mouth daily. 3/6/2022 at am     atorvastatin (LIPITOR) 80 MG tablet [ATORVASTATIN (LIPITOR) 80 MG TABLET] TAKE 1 TABLET BY MOUTH EVERYDAY AT BEDTIME (Patient taking differently: Take 80 mg by mouth At Bedtime ) 3/6/2022 at pm     hydroCHLOROthiazide (HYDRODIURIL) 25 MG tablet [HYDROCHLOROTHIAZIDE (HYDRODIURIL) 25 MG TABLET] Take 0.5 tablets (12.5 mg total) by mouth daily. 3/6/2022 at am     irbesartan (AVAPRO) 150 MG tablet [IRBESARTAN (AVAPRO) 150 MG TABLET] TAKE 1 TABLET BY MOUTH EVERY DAY (Patient taking differently: Take 150 mg by mouth daily ) 3/6/2022 at am     levothyroxine (SYNTHROID, LEVOTHROID) 125 MCG tablet [LEVOTHYROXINE (SYNTHROID, LEVOTHROID) 125 MCG TABLET] TAKE 1 TABLET BY MOUTH EVERY DAY (Patient taking differently: Take 125 mcg by mouth daily ) 3/7/2022 at am     metFORMIN (GLUCOPHAGE) 1000 MG tablet [METFORMIN (GLUCOPHAGE) 1000 MG TABLET] TAKE 1 TABLET BY MOUTH TWICE A DAY WITH MEALS (Patient taking differently: Take 1,000 mg by mouth 2 times daily (with meals) ) 3/6/2022 at pm     methocarbamol (ROBAXIN) 500 MG tablet Take 500 mg by mouth 2 times daily as needed for muscle spasms Unknown at Unknown time     metoprolol succinate (TOPROL-XL) 25 MG [METOPROLOL SUCCINATE (TOPROL-XL) 25 MG] TAKE 1/2 TABLET BY MOUTH 2 TIMES DAILY (Patient taking differently: Take 12.5 mg by mouth 2 times daily ) 3/6/2022 at pm     mirtazapine (REMERON) 7.5 MG tablet Take 7.5 mg by mouth At Bedtime 3/6/2022 at pm     multivitamin  with lutein (OCUVITE WITH LUTEIN) CAPS per capsule Take 2 capsules by mouth daily 3/6/2022 at am     multivitamin (CENTRUM SILVER) tablet Take 1 tablet by mouth daily 3/6/2022 at am     omega-3  fatty acids-fish oil 340-1,000 mg cap [OMEGA-3 FATTY ACIDS-FISH -1,000 MG CAP] Take 1 capsule by mouth daily.  3/6/2022 at am     tamsulosin (FLOMAX) 0.4 MG capsule Take 0.4 mg by mouth daily 3/6/2022 at am     traZODone (DESYREL) 50 MG tablet Take 50 mg by mouth At Bedtime 3/6/2022 at pm       Information source(s): Patient, [facility] and [facility] (patient provided [facility] clinic list-matches surescripts)  Method of interview communication: in-person    Summary of Changes to PTA Med List  New: trazodone, tamsulosin, Ocuvite, mirtazepine, methocarbamol, fibercon  Discontinued: flaxseed  Changed: none    Patient was asked about OTC/herbal products specifically.  PTA med list reflects this.    In the past week, patient estimated taking medication this percent of the time:  greater than 90%.    Allergies were reviewed, assessed, and updated with the patient.      Patient does not use any multi-dose medications prior to admission.    The information provided in this note is only as accurate as the sources available at the time of the update(s).    Thank you for the opportunity to participate in the care of this patient.    Mesha Torres RPH  3/7/2022 4:12 PM

## 2022-03-07 NOTE — ED PROVIDER NOTES
eMERGENCY dEPARTMENT PROGRESS NOTE         ED COURSE AND MEDICAL DECISION MAKING  Patient was signed out to me by Dr. Blanco at 3:05 PM      Lionel Chávez is a 89 year old male who presents after he woke up feeling funny this morning.  It sounds like he has had near syncope and has been feeling weak.  His CT scans came back unremarkable but he still unable to ambulate safely.  It sounds like he has had multiple falls according to his daughter and is not safe at home.  He likely will need admission to the hospital and possibly placement.  I have a call out to the admitting physician.    I spoke with Dr. Villafuerte who accepted the patient on a medical telemetry bed.    3:05 PM I accepted patient in signout from Dr. Blanco. Will follow up on bed placement.   4:00 PM I spoke with Dr. Villafuerte, hospitalist, who accepts the patient for observation admission.      LAB  Pertinent labs results reviewed   Labs Ordered and Resulted from Time of ED Arrival to Time of ED Departure   COMPREHENSIVE METABOLIC PANEL - Abnormal       Result Value    Sodium 132 (*)     Potassium 4.6      Chloride 102      Carbon Dioxide (CO2) 25      Anion Gap 5      Urea Nitrogen 20      Creatinine 0.81      Calcium 9.0      Glucose 162 (*)     Alkaline Phosphatase 105      AST 25      ALT 35      Protein Total 7.1      Albumin 3.5      Bilirubin Total 0.7      GFR Estimate 81     ROUTINE UA WITH MICROSCOPIC REFLEX TO CULTURE - Abnormal    Color Urine Light Yellow      Appearance Urine Clear      Glucose Urine Negative      Bilirubin Urine Negative      Ketones Urine Negative      Specific Gravity Urine 1.024      Blood Urine Negative      pH Urine 6.5      Protein Albumin Urine 10  (*)     Urobilinogen Urine <2.0      Nitrite Urine Negative      Leukocyte Esterase Urine Negative      Bacteria Urine Few (*)     Mucus Urine Present (*)     RBC Urine <1      WBC Urine <1      Squamous Epithelials Urine <1     CBC WITH PLATELETS - Abnormal    WBC Count 3.1  (*)     RBC Count 3.67 (*)     Hemoglobin 11.1 (*)     Hematocrit 34.2 (*)     MCV 93      MCH 30.2      MCHC 32.5      RDW 14.0      Platelet Count 169     GLUCOSE BY METER - Abnormal    GLUCOSE BY METER POCT 142 (*)    LACTIC ACID WHOLE BLOOD - Normal    Lactic Acid 0.9     TROPONIN I - Normal    Troponin I 0.09     TROPONIN I           RADIOLOGY    Pertinent imaging reviewed   Please see official radiology report.  CTA Head Neck with Contrast   Final Result   IMPRESSION:    HEAD CT:   1.  No intracranial hemorrhage, mass lesions, hydrocephalus or CT evidence for an acute infarct.   2.  Mild diffuse cerebral parenchymal volume loss. Presumed chronic hypertensive/microvascular ischemic white matter changes.   3.  Mild to moderate chronic mucosal thickening of the maxillary sinuses, the inferior frontal sinuses and ethmoid air cells.       HEAD CTA:    1.  No high-grade stenoses or occlusion of the major intracranial arteries. No intracranial aneurysms.      NECK CTA:   1.  No hemodynamically significant narrowing of the internal carotid arteries bilaterally based on the NASCET criteria.   2.  The vertebral arteries are patent throughout their course in the neck.          FINAL IMPRESSION    1. Generalized muscle weakness    2. Unsteady gait            Faviola Ga MD  03/07/22 1600

## 2022-03-08 ENCOUNTER — APPOINTMENT (OUTPATIENT)
Dept: OCCUPATIONAL THERAPY | Facility: HOSPITAL | Age: 87
End: 2022-03-08
Attending: FAMILY MEDICINE
Payer: COMMERCIAL

## 2022-03-08 ENCOUNTER — APPOINTMENT (OUTPATIENT)
Dept: CARDIOLOGY | Facility: HOSPITAL | Age: 87
End: 2022-03-08
Attending: FAMILY MEDICINE
Payer: COMMERCIAL

## 2022-03-08 ENCOUNTER — APPOINTMENT (OUTPATIENT)
Dept: PHYSICAL THERAPY | Facility: HOSPITAL | Age: 87
End: 2022-03-08
Attending: FAMILY MEDICINE
Payer: COMMERCIAL

## 2022-03-08 LAB
ANION GAP SERPL CALCULATED.3IONS-SCNC: 8 MMOL/L (ref 5–18)
ATRIAL RATE - MUSE: 63 BPM
BUN SERPL-MCNC: 16 MG/DL (ref 8–28)
CALCIUM SERPL-MCNC: 9.1 MG/DL (ref 8.5–10.5)
CHLORIDE BLD-SCNC: 102 MMOL/L (ref 98–107)
CO2 SERPL-SCNC: 23 MMOL/L (ref 22–31)
CREAT SERPL-MCNC: 0.74 MG/DL (ref 0.7–1.3)
DIASTOLIC BLOOD PRESSURE - MUSE: NORMAL MMHG
ERYTHROCYTE [DISTWIDTH] IN BLOOD BY AUTOMATED COUNT: 13.8 % (ref 10–15)
FOLATE SERPL-MCNC: 16.4 NG/ML
GFR SERPL CREATININE-BSD FRML MDRD: 83 ML/MIN/1.73M2
GLUCOSE BLD-MCNC: 113 MG/DL (ref 70–125)
GLUCOSE BLDC GLUCOMTR-MCNC: 109 MG/DL (ref 70–99)
GLUCOSE BLDC GLUCOMTR-MCNC: 116 MG/DL (ref 70–99)
GLUCOSE BLDC GLUCOMTR-MCNC: 172 MG/DL (ref 70–99)
GLUCOSE BLDC GLUCOMTR-MCNC: 175 MG/DL (ref 70–99)
HCT VFR BLD AUTO: 30.6 % (ref 40–53)
HGB BLD-MCNC: 10 G/DL (ref 13.3–17.7)
INTERPRETATION ECG - MUSE: NORMAL
LVEF ECHO: NORMAL
MCH RBC QN AUTO: 29.8 PG (ref 26.5–33)
MCHC RBC AUTO-ENTMCNC: 32.7 G/DL (ref 31.5–36.5)
MCV RBC AUTO: 91 FL (ref 78–100)
P AXIS - MUSE: -19 DEGREES
PLATELET # BLD AUTO: 166 10E3/UL (ref 150–450)
POTASSIUM BLD-SCNC: 3.9 MMOL/L (ref 3.5–5)
PR INTERVAL - MUSE: 144 MS
QRS DURATION - MUSE: 176 MS
QT - MUSE: 486 MS
QTC - MUSE: 497 MS
R AXIS - MUSE: 79 DEGREES
RBC # BLD AUTO: 3.36 10E6/UL (ref 4.4–5.9)
SARS-COV-2 RNA RESP QL NAA+PROBE: NEGATIVE
SODIUM SERPL-SCNC: 133 MMOL/L (ref 136–145)
SYSTOLIC BLOOD PRESSURE - MUSE: NORMAL MMHG
T AXIS - MUSE: 35 DEGREES
VENTRICULAR RATE- MUSE: 63 BPM
VIT B12 SERPL-MCNC: 386 PG/ML (ref 193–986)
WBC # BLD AUTO: 2.8 10E3/UL (ref 4–11)

## 2022-03-08 PROCEDURE — 97162 PT EVAL MOD COMPLEX 30 MIN: CPT | Mod: GP

## 2022-03-08 PROCEDURE — G0378 HOSPITAL OBSERVATION PER HR: HCPCS

## 2022-03-08 PROCEDURE — 97530 THERAPEUTIC ACTIVITIES: CPT | Mod: GP

## 2022-03-08 PROCEDURE — 250N000011 HC RX IP 250 OP 636: Performed by: FAMILY MEDICINE

## 2022-03-08 PROCEDURE — 80048 BASIC METABOLIC PNL TOTAL CA: CPT | Performed by: FAMILY MEDICINE

## 2022-03-08 PROCEDURE — 82962 GLUCOSE BLOOD TEST: CPT

## 2022-03-08 PROCEDURE — 93306 TTE W/DOPPLER COMPLETE: CPT

## 2022-03-08 PROCEDURE — 93306 TTE W/DOPPLER COMPLETE: CPT | Mod: 26 | Performed by: INTERNAL MEDICINE

## 2022-03-08 PROCEDURE — 96372 THER/PROPH/DIAG INJ SC/IM: CPT | Performed by: FAMILY MEDICINE

## 2022-03-08 PROCEDURE — 99220 PR INITIAL OBSERVATION CARE,LEVEL III: CPT | Mod: GC | Performed by: INTERNAL MEDICINE

## 2022-03-08 PROCEDURE — 97116 GAIT TRAINING THERAPY: CPT | Mod: GP

## 2022-03-08 PROCEDURE — 97530 THERAPEUTIC ACTIVITIES: CPT | Mod: GO

## 2022-03-08 PROCEDURE — 97165 OT EVAL LOW COMPLEX 30 MIN: CPT | Mod: GO

## 2022-03-08 PROCEDURE — 82746 ASSAY OF FOLIC ACID SERUM: CPT | Performed by: FAMILY MEDICINE

## 2022-03-08 PROCEDURE — 250N000013 HC RX MED GY IP 250 OP 250 PS 637: Performed by: FAMILY MEDICINE

## 2022-03-08 PROCEDURE — 96372 THER/PROPH/DIAG INJ SC/IM: CPT

## 2022-03-08 PROCEDURE — 96376 TX/PRO/DX INJ SAME DRUG ADON: CPT

## 2022-03-08 PROCEDURE — 97535 SELF CARE MNGMENT TRAINING: CPT | Mod: GO

## 2022-03-08 PROCEDURE — 250N000013 HC RX MED GY IP 250 OP 250 PS 637: Performed by: INTERNAL MEDICINE

## 2022-03-08 PROCEDURE — 85027 COMPLETE CBC AUTOMATED: CPT | Performed by: FAMILY MEDICINE

## 2022-03-08 PROCEDURE — 250N000013 HC RX MED GY IP 250 OP 250 PS 637: Performed by: STUDENT IN AN ORGANIZED HEALTH CARE EDUCATION/TRAINING PROGRAM

## 2022-03-08 PROCEDURE — 36415 COLL VENOUS BLD VENIPUNCTURE: CPT | Performed by: FAMILY MEDICINE

## 2022-03-08 RX ORDER — NALOXONE HYDROCHLORIDE 0.4 MG/ML
0.2 INJECTION, SOLUTION INTRAMUSCULAR; INTRAVENOUS; SUBCUTANEOUS
Status: DISCONTINUED | OUTPATIENT
Start: 2022-03-08 | End: 2022-03-10 | Stop reason: HOSPADM

## 2022-03-08 RX ORDER — NALOXONE HYDROCHLORIDE 0.4 MG/ML
0.4 INJECTION, SOLUTION INTRAMUSCULAR; INTRAVENOUS; SUBCUTANEOUS
Status: DISCONTINUED | OUTPATIENT
Start: 2022-03-08 | End: 2022-03-10 | Stop reason: HOSPADM

## 2022-03-08 RX ORDER — ACETAMINOPHEN 325 MG/1
650 TABLET ORAL 3 TIMES DAILY
Status: DISCONTINUED | OUTPATIENT
Start: 2022-03-08 | End: 2022-03-10 | Stop reason: HOSPADM

## 2022-03-08 RX ORDER — MAGNESIUM 200 MG
1000 TABLET ORAL DAILY
Status: DISCONTINUED | OUTPATIENT
Start: 2022-03-08 | End: 2022-03-10 | Stop reason: HOSPADM

## 2022-03-08 RX ORDER — LIDOCAINE 4 G/G
1 PATCH TOPICAL
Status: DISCONTINUED | OUTPATIENT
Start: 2022-03-08 | End: 2022-03-10 | Stop reason: HOSPADM

## 2022-03-08 RX ORDER — KETOROLAC TROMETHAMINE 30 MG/ML
15 INJECTION, SOLUTION INTRAMUSCULAR; INTRAVENOUS EVERY 6 HOURS PRN
Status: DISCONTINUED | OUTPATIENT
Start: 2022-03-08 | End: 2022-03-10 | Stop reason: HOSPADM

## 2022-03-08 RX ADMIN — LIDOCAINE 1 PATCH: 246 PATCH TOPICAL at 19:52

## 2022-03-08 RX ADMIN — ACETAMINOPHEN 650 MG: 325 TABLET ORAL at 23:03

## 2022-03-08 RX ADMIN — METOPROLOL SUCCINATE 12.5 MG: 25 TABLET, EXTENDED RELEASE ORAL at 19:52

## 2022-03-08 RX ADMIN — LIDOCAINE: 50 OINTMENT TOPICAL at 16:10

## 2022-03-08 RX ADMIN — INSULIN ASPART 1 UNITS: 100 INJECTION, SOLUTION INTRAVENOUS; SUBCUTANEOUS at 12:17

## 2022-03-08 RX ADMIN — ACETAMINOPHEN 650 MG: 325 TABLET ORAL at 14:06

## 2022-03-08 RX ADMIN — LEVOTHYROXINE SODIUM 125 MCG: 125 TABLET ORAL at 08:42

## 2022-03-08 RX ADMIN — METOPROLOL SUCCINATE 12.5 MG: 25 TABLET, EXTENDED RELEASE ORAL at 08:42

## 2022-03-08 RX ADMIN — ENOXAPARIN SODIUM 40 MG: 40 INJECTION SUBCUTANEOUS at 19:52

## 2022-03-08 RX ADMIN — ATORVASTATIN CALCIUM 80 MG: 40 TABLET, FILM COATED ORAL at 23:04

## 2022-03-08 RX ADMIN — LIDOCAINE: 50 OINTMENT TOPICAL at 12:21

## 2022-03-08 RX ADMIN — ACETAMINOPHEN 650 MG: 325 TABLET ORAL at 00:13

## 2022-03-08 RX ADMIN — ASPIRIN 81 MG: 81 TABLET, COATED ORAL at 08:42

## 2022-03-08 RX ADMIN — LIDOCAINE: 50 OINTMENT TOPICAL at 19:52

## 2022-03-08 RX ADMIN — ACETAMINOPHEN 650 MG: 325 TABLET ORAL at 10:03

## 2022-03-08 RX ADMIN — MIRTAZAPINE 7.5 MG: 7.5 TABLET, FILM COATED ORAL at 23:03

## 2022-03-08 RX ADMIN — INSULIN ASPART 1 UNITS: 100 INJECTION, SOLUTION INTRAVENOUS; SUBCUTANEOUS at 16:53

## 2022-03-08 RX ADMIN — LIDOCAINE: 50 OINTMENT TOPICAL at 08:43

## 2022-03-08 RX ADMIN — TRAZODONE HYDROCHLORIDE 50 MG: 50 TABLET ORAL at 23:04

## 2022-03-08 RX ADMIN — TAMSULOSIN HYDROCHLORIDE 0.4 MG: 0.4 CAPSULE ORAL at 08:42

## 2022-03-08 RX ADMIN — HYDRALAZINE HYDROCHLORIDE 10 MG: 20 INJECTION INTRAMUSCULAR; INTRAVENOUS at 07:31

## 2022-03-08 RX ADMIN — IRBESARTAN 150 MG: 150 TABLET, FILM COATED ORAL at 08:42

## 2022-03-08 RX ADMIN — Medication 1000 MCG: at 16:10

## 2022-03-08 NOTE — PROGRESS NOTES
[facility]      OUTPATIENT OCCUPATIONAL THERAPY  EVALUATION  PLAN OF TREATMENT FOR OUTPATIENT REHABILITATION  (COMPLETE FOR INITIAL CLAIMS ONLY)  Patient's Last Name, First Name, M.I.  YOB: 1926  MaytinyLionel                          Provider's Name  [facility] Medical Record No.  [id number]                               Onset Date:  03/07/22   Start of Care Date:  (P) 03/08/22     Type:     ___PT   _X_OT   ___SLP Medical Diagnosis:  (P) pre-syncope                        OT Diagnosis:  decreased ADLs   Visits from SOC:  1   _________________________________________________________________________________  Plan of Treatment/Functional Goals    Planned Interventions: ADL retraining, balance training, transfer training, progressive activity/exercise, home program guidelines   Goals: See Occupational Therapy Goals on Care Plan in [facility] electronic health record.    Therapy Frequency: Daily  Predicted Duration of Therapy Intervention: 03/14/22  _________________________________________________________________________________    I CERTIFY THE NEED FOR THESE SERVICES FURNISHED UNDER        THIS PLAN OF TREATMENT AND WHILE UNDER MY CARE     (Physician co-signature of this document indicates review and certification of the therapy plan).                Certification date from: (P) 03/08/22, Certification date to: (P) 03/14/22    Referring Physician: Shanta Villafuerte            Initial Assessment        See Occupational Therapy evaluation dated (P) 03/08/22 in Epic electronic health record.

## 2022-03-08 NOTE — ED NOTES
[facility] ED Handoff Report    ED Chief Complaint: Near syncope    ED Diagnosis:  (M62.81) Generalized muscle weakness  Comment: .  Plan: .    (R26.81) Unsteady gait  Comment: .  Plan: .       PMH:    Past Medical History:   Diagnosis Date     CAD (coronary artery disease)      DM2 (diabetes mellitus, type 2) (H)         Code Status:  Full Code     Falls Risk: Yes Band: Applied    Current Living Situation/Residence: lives alone     Elimination Status: Continent: Yes     Activity Level: SBA w/ walker    Patients Preferred Language:  English     Needed: No    Vital Signs:  BP (!) 219/93   Pulse 66   Temp (!) 96.7  F (35.9  C) (Tympanic)   Resp 17   Wt 81.6 kg (180 lb)   SpO2 99%   BMI 26.54 kg/m       Cardiac Rhythm: NSR    Pain Score: 4/10    Is the Patient Confused:  No    Last Food or Drink: 03/07/22 at 1930    Focused Assessment:  AAOx4, RA, 1 person assist with wheelchair for safety due to dizziness, pain to right ribs and shoulder from prior injury, continent    Tests Performed: Done: Labs and Imaging    Treatments Provided:  .    Family Dynamics/Concerns: No    Family Updated On Visitor Policy: Yes    Plan of Care Communicated to Family: Yes    Who Was Updated about Plan of Care: [facility] Checklist Done and Signed by Patient: Yes    Medications sent with patient: insulin pen, lidocaine cream    Covid: asymptomatic , negative    Additional Information: Lionel Chávez is a 89 year old old male with CAD, recent rib fractures, DM2 presents with dizziness and presyncope.  He endorses the morning of 3/7 he was using his right hand to help get himself up in bed when he felt a sharp radiating pain from the shoulder down to the hand.  He immediately began to feel dizzy and a little bit weak but was able to get up and go to the bathroom with the assistance of a cane.  As soon as he got back into bed he endorsed he lost all of his strength and immediately laid down.  Denied  hitting his head or loss of consciousness.  Also denied any nausea, vomiting, dysuria, diarrhea, chest pain, fevers, chills, difficulty breathing.  Patient does endorse he is having ongoing right-sided rib pain from recent fractures.  Denied ever having felt like this before having the sharp shooting pain in his right shoulder and arm.     History is provided by patient and his daughter Jose Manuel at bedside    RN: Marian Sequeira  . 3/7/2022 9:00 PM

## 2022-03-08 NOTE — PLAN OF CARE
PRIMARY DIAGNOSIS: GENERALIZED WEAKNESS    OUTPATIENT/OBSERVATION GOALS TO BE MET BEFORE DISCHARGE  1. Orthostatic performed: No pt unable to stand for blood pressure     2. Tolerating PO medications: Yes    3. Return to near baseline physical activity: No    4. Cleared for discharge by consultants (if involved): No    Discharge Planner Nurse   Safe discharge environment identified: No  Barriers to discharge: Yes       Entered by: Jyotsna Engle 03/07/2022 11:51 PM     Please review provider order for any additional goals.   Nurse to notify provider when observation goals have been met and patient is ready for discharge.Goal Outcome Evaluation:

## 2022-03-08 NOTE — PLAN OF CARE
Goal Outcome Evaluation:             PRIMARY DIAGNOSIS: GENERALIZED WEAKNESS    OUTPATIENT/OBSERVATION GOALS TO BE MET BEFORE DISCHARGE  1. Orthostatic performed: No    2. Tolerating PO medications: Yes    3. Return to near baseline physical activity: No    4. Cleared for discharge by consultants (if involved): No    Discharge Planner Nurse   Safe discharge environment identified: No  Barriers to discharge: Yes       Entered by: Juan Shea 03/08/2022 11:12 AM     Please review provider order for any additional goals.   Nurse to notify provider when observation goals have been met and patient is ready for discharge.       Pt. Pleasant and cooperative this shift, c/o pain to R rib cage but scheduled tylenol and lidocaine cream relieve pain, BG this am 109, ate well, up to bathroom with standby, will cont to monitor.

## 2022-03-08 NOTE — PLAN OF CARE
Problem: Plan of Care - These are the overarching goals to be used throughout the patient stay.    Goal: Optimal Comfort and Wellbeing  Outcome: Ongoing, Progressing     Problem: Pain Chronic (Persistent) (Comorbidity Management)  Goal: Acceptable Pain Control and Functional Ability  Outcome: Ongoing, Progressing     Pt reported no pain overnight. Chronic pacemaker.  Tele: Sinus arhythmia w/ BBB, ST depression, Elongated QT, and PAC's  Broken rib pain managed with Tylenol.   Primofit in place.  Ambulated to bathroom assist of one w/ walker and gate belt.   VSS

## 2022-03-08 NOTE — PROGRESS NOTES
[facility]    Medicine Progress Note - Hospitalist Service    Date of Admission:  3/7/2022    Assessment & Plan          89-year-old male with CAD, HTN, DM 2 presented with presyncopal episode and generalized weakness and adult failure to thrive, mild hyponatremia.     Active Problems:    Type 2 diabetes mellitus (H)    CAD (coronary artery disease)    Hypothyroidism    Unsteady gait    Generalized muscle weakness    Pre-syncope    Adult failure to thrive    Asymptomatic hypertensive urgency    Mood disorder (H)    Hyponatremia    BPH (benign prostatic hyperplasia)    Leukopenia    Normocytic anemia     Present on Admission:    Unsteady gait    Generalized muscle weakness    Type 2 diabetes mellitus (H)    Hypothyroidism     Presyncope  -Patient endorsed some weakness and presyncope at home but denied any loss of consciousness or hitting his head.  CTA head and neck without intracranial hemorrhage, mass, acute infarct but with mild diffuse cerebral parenchymal disease and chronic hypertensive/microvascular ischemic changes.  No high-grade stenosis of head or neck.  No signs of infection, mild anemia present, blood glucose level normal, mild hyponatremia but unlikely to cause syncope.  Patient endorsed sharp right shoulder/radiating arm pain prior to becoming dizzy so highly suspect pain/vasovagal response.  -Telemetry, normal sinus rhythm  -Troponin x3 negative  -TTE 3/7: EF 55 to 60%, mild TR.  -Hold home Robaxin  -Pacemaker interrogation  -Orthostatic Bps     HTN urgency  -Hold home hydrochlorothiazide given low sodium  -C/w home irbesartan, metoprolol, as needed hydralazine  -Consider increasing dose of ARB.  Earline elevated    Adult failure to thrive  -Patient with weakness, inability to care for himself at home.  Leukopenia and mild normocytic anemia, only mild hyponatremia with 132, no signs of infection in urine.  Doubt infectious or cardiac etiology  -Checked normal folate, TSH, low  normal B12/added supplementation  -PT/OT  -Fall precautions     Right 8, 9 and 10th rib fractures, on CXR 3/7.  Eighth and ninth rib fractures a slightly displaced.  No fractures or subluxations of her right shoulder.  Likely continued musculoskeletal pain and tightness especially in the right supraspinatus, deltoids muscle that may be causing some pressure on brachial plexus.  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  Troponin negative x2, EKG with LBBB and paced.  Not sure if LBBB is new as no prior EKGs are able to be viewed.  Chest pain-free  -ASA 81 mg p.o. daily  -Atorvastatin 80 mg p.o. daily  -Metoprolol XL 12.5 mg p.o. twice daily     Hypothermia  -External warming     DM 2  -A1c 6.8  -Hold home Metformin, in fact would not continue at discharge as patient 89 years old with very well controlled diabetes  -Sensitive scale aspart insulin 3 times daily AC as needed  -Accu-Chek 3 times daily AC at bedtime  -Hypoglycemic protocol     Mood disorder  -Mirtazapine 7.5 mg p.o. q. Bedtime     BPH  -Tamsulosin 0.4 mg p.o. daily     Hypothyroidism  -TSH 1.55, will recheck given hyponatremia and hypothermia     Hyponatremia  -Mild, likely hypovolemic hyponatremia in the setting of adult failure to thrive.  Hold home HCTZ  -Check TSH, cortisol, urine sodium and osmole  -Hold off on IV fluids for now as patient with severe range blood pressures     Bicytopenia  -Leukopenia with WBC 3.1, normocytic anemia with hemoglobin 11.1 and MCV of 93.  No prior CBC to compare to so unclear if this is chronic or new.     Diet: Combination Diet High Consistent Carb (75 g CHO per Meal) Diet; Low Saturated Fat Na <2400mg Diet    DVT Prophylaxis: Enoxaparin (Lovenox) SQ  [place] Catheter: Not present  Central Lines: None  Cardiac Monitoring: ACTIVE order. Indication: Syncope- low cardiac risk (24 hours)  Code Status:  "Full Code      Disposition Plan   Expected Discharge: Anticipate discharge home in 1 to 2 days.     The patient's care was discussed with the Bedside Nurse and Patient.    Philomena Reyes MD  Hospitalist Service  [facility]  Securely message with the Vocera Web Console (learn more here)  Text page via [facility] Paging/Directory     Clinically Significant Risk Factors Present on Admission               # Platelet Defect: home medication list includes an antiplatelet medication   # Diabetes, type II: last A1C 7.2 % (Ref range: <=5.6 %)  # Overweight: Estimated body mass index is 27.26 kg/m  as calculated from the following:    Height as of this encounter: 1.778 m (5' 10\").    Weight as of this encounter: 86.2 kg (190 lb).    ________________________________________________________________    Interval History   Patient is new to me.  Chart reviewed.  Patient was seen and examined.  He is in good spirit today, very conversant.  He is complaining of right-sided chest pain, with deep breath only.  No retrosternal chest pain, cardiac palpitations, cough, abdominal pain, nausea, dysuria.  Called patient's wife, no answer, left voice message with callback number.    Data reviewed today: I reviewed all medications, new labs and imaging results over the last 24 hours. I personally reviewed.    Physical Exam   Vital Signs: Temp: 97.5  F (36.4  C) Temp src: Oral BP: (!) 173/73 Pulse: 69   Resp: 20 SpO2: 95 % O2 Device: None (Room air)    Weight: 190 lbs 0 oz  General: Alert and oriented x 3. Not in obvious distress.  HEENT: NC, AT. Neck- supple, No JVP elevation, lymphadenopathy or thyromegaly. Trachea-central.  Chest: Clear to auscultation bilaterally.  Tenderness to palpation over anterior/lateral mid chest wall.  Heart: S1S2 regular. No M/R/G.  Abdomen: Soft. NT, ND. No organomegaly. Bowel sounds- active.  Back: No spine tenderness. No CVA tenderness.  Extremities: No leg swelling. Peripheral pulses 2+ " bilaterally.  Neuro: Cranial nerves 1-12 grossly normal. No focal neurological deficit    Data   Recent Labs   Lab 03/08/22  1146 03/08/22  0820 03/08/22  0635 03/07/22  1747 03/07/22  1201   WBC  --   --  2.8*  --  3.1*   HGB  --   --  10.0*  --  11.1*   MCV  --   --  91  --  93   PLT  --   --  166  --  169   NA  --   --  133*  --  132*   POTASSIUM  --   --  3.9  --  4.6   CHLORIDE  --   --  102  --  102   CO2  --   --  23  --  25   BUN  --   --  16  --  20   CR  --   --  0.74  --  0.81   ANIONGAP  --   --  8  --  5   MERLYN  --   --  9.1  --  9.0   * 109* 113   < > 162*   ALBUMIN  --   --   --   --  3.5   PROTTOTAL  --   --   --   --  7.1   BILITOTAL  --   --   --   --  0.7   ALKPHOS  --   --   --   --  105   ALT  --   --   --   --  35   AST  --   --   --   --  25    < > = values in this interval not displayed.     Recent Results (from the past 24 hour(s))   XR Shoulder Right 2 Views    Narrative    EXAM: XR SHOULDER 2 VIEW RIGHT  LOCATION: [facility]  DATE/TIME: 3/7/2022 5:56 PM    INDICATION: recent fall with right rib fractures and acute right shoulder pain  COMPARISON: None.      Impression    IMPRESSION: No acute fracture or malalignment. Moderate glenohumeral and acromioclavicular joint degenerative changes. Osteopenia.   XR Ribs Right 2 Views    Narrative    EXAM: XR RIBS RT 2 VW  LOCATION: [facility]  DATE/TIME: 3/7/2022 5:56 PM    INDICATION: rib fractures 1 month ago with increasing pain  COMPARISON: 02/06/2022      Impression    IMPRESSION: Dual-lead left subclavian venous pacer with coronary arterial stent. No hydropneumothorax. Lungs are clear. Heart and pulmonary vascularity are normal.     Right rib detail films obtained with a marker. There are acute, right lateral eighth, ninth and 10th  rib fractures. The 8th and 9th rib fractures are slightly displaced and  more angulated than before.   Echocardiogram Complete   Result Value    LVEF  55-60%     [facility]    [id number]  [id number]  [id number]  [id number]^SHY^KERI^[place] [address]     Name: NEENA LUO  MRN: [id number]  : 1926  Study Date: 2022 09:20 AM  Age: 89 yrs  Gender: Male  Patient Location: [facility]  Reason For Study: Syncope  Ordering Physician: KERI BEGUM  Performed By: SHRADDHA     BSA: 2.0 m2  Height: 70 in  Weight: 190 lb  HR: 74  ______________________________________________________________________________  Procedure  Complete Echo Adult.  ______________________________________________________________________________  Interpretation Summary     The left ventricle is normal in size.  Normal left ventricular wall motion  The visual ejection fraction is 55-60%.  Septal wall motion abnormality may reflect pacemaker activation.  Normal right ventricle size and systolic function.  Pacemaker catheter in the right heart  The left atrium is moderately dilated.  There is mild (1+) tricuspid regurgitation.  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  ______________________________________________________________________________  I      WMSI = 1.00     % Normal = 100     X - Cannot   0 -                      (2) - Mildly 2 -          Segments  Size  Interpret    Hyperkinetic 1 - Normal  Hypokinetic  Hypokinetic  1-2     small                                                     7 -          3-5      moderate  3 - Akinetic 4 -          5 -         6 - Akinetic Dyskinetic   6-14    large               Dyskinetic   Aneurysmal  w/scar       w/scar       15-16   diffuse     Left Ventricle  The left ventricle is normal in size. There is normal left ventricular wall  thickness. Diastolic Doppler findings (E/E' ratio and/or other parameters)  suggest left ventricular filling pressures are normal. The visual ejection  fraction is  55-60%. Left ventricular diastolic function is indeterminate.  Grade I or early diastolic dysfunction. Normal left ventricular wall motion.  Septal wall motion abnormality may reflect pacemaker activation.     Right Ventricle  There is a catheter/pacemaker lead seen in the right ventricle. Normal right  ventricle size and systolic function. TAPSE is normal, which is consistent  with normal right ventricular systolic function.     Atria  The left atrium is moderately dilated. Pacer wire in right atrium. Right  atrial size is normal.     Mitral Valve  There is mild to moderate mitral annular calcification. The mitral valve  leaflets appear thickened, but open well. There is trace to mild mitral  regurgitation.     Tricuspid Valve  There is mild (1+) tricuspid regurgitation. Right ventricle systolic pressure  estimate normal. The right ventricular systolic pressure is approximated at  25.2 mmHg plus the right atrial pressure.     Aortic Valve  Mild aortic valve calcification is present. Mild valvular aortic stenosis.     Pulmonic Valve  The pulmonic valve is not well visualized. There is trace pulmonic valvular  regurgitation.     Vessels  The aorta root is normal. The ascending aorta is Mildly dilated. IVC diameter  <2.1 cm collapsing >50% with sniff suggests a normal RA pressure of 3 mmHg.     Pericardium  There is no pericardial effusion.     ______________________________________________________________________________  MMode/2D Measurements & Calculations  IVSd: 1.0 cm  LVIDd: 4.3 cm  LVIDs: 3.0 cm  LVPWd: 1.3 cm  FS: 29.3 %  LV mass(C)d: 174.5 grams  LV mass(C)dI: 85.4 grams/m2  Ao root diam: 3.3 cm  LA dimension: 3.7 cm     asc Aorta Diam: 3.9 cm  LA/Ao: 1.1  LVOT diam: 2.5 cm  LVOT area: 5.0 cm2  LA Volume Indexed (AL/bp): 66.0 ml/m2  RWT: 0.61     Time Measurements  MM HR: 67.0 BPM     Doppler Measurements & Calculations  MV E max alyce: 74.7 cm/sec  MV A max alyce: 109.6 cm/sec  MV E/A: 0.68     MV dec slope:  279.1 cm/sec2  MV dec time: 0.27 sec  Ao V2 max: 202.6 cm/sec  Ao max P.0 mmHg  Ao V2 mean: 135.8 cm/sec  Ao mean P.4 mmHg  Ao V2 VTI: 44.6 cm  QASIM(I,D): 2.8 cm2  QASIM(V,D): 2.5 cm2  LV V1 max P.1 mmHg  LV V1 max: 100.9 cm/sec  LV V1 VTI: 24.9 cm  SV(LVOT): 123.4 ml  SI(LVOT): 60.4 ml/m2  PA acc time: 0.14 sec  TR max royce: 251.1 cm/sec  TR max P.2 mmHg  AV Royce Ratio (DI): 0.50  QASIM Index (cm2/m2): 1.4  E/E' avg: 10.1  Lateral E/e': 9.2  Medial E/e': 11.0     ______________________________________________________________________________  Report approved by: Enzo Seay 2022 10:57 AM

## 2022-03-08 NOTE — ED NOTES
Spoke to Irene on P2. Pt ready to come up. Note in. Irene will tell HALIMA Goyal that patient is on the way.

## 2022-03-08 NOTE — PLAN OF CARE
Problem: Plan of Care - These are the overarching goals to be used throughout the patient stay.    Goal: Optimal Comfort and Wellbeing  Outcome: Ongoing, Progressing   Goal Outcome Evaluation:      Pt. Very pleasant and cooperative, denies pain, up with assist of 1, walker, and gait belt, eating well for meals, BG today 109, 175, 172, remains on telemetry, has pacemaker, no prn medications given, scheduled tylenol and lidocaine cream provide adequate pain relief to R side rib cage pain, will cont to monitor.

## 2022-03-08 NOTE — PROGRESS NOTES
"Occupational Therapy     03/08/22 1500   Quick Adds   Type of Visit Initial Occupational Therapy Evaluation   Living Environment   Living Environment Comments see PT eval for specifics   Self-Care   Usual Activity Tolerance good   Current Activity Tolerance fair   Activity/Exercise/Self-Care Comment see PT eval for specifics; states his spouse has pulmonary issues, so he has been assisting more.   General Information   Onset of Illness/Injury or Date of Surgery 03/07/22   Referring Physician Shanta Villafuerte   Patient/Family Therapy Goal Statement (OT) hoping to DC home tomorrow.   Existing Precautions/Restrictions fall  (~1 month post R lateral 8-10 rib fxs)   General Observations and Info Admitted w/hypertension, pre-syncopy, generalized weakness.    Cognitive Status Examination   Orientation Status orientation to person, place and time   Follows Commands WNL   Pain Assessment   Patient Currently in Pain   (R-sided rib pain.)   Posture   Posture not impaired   Range of Motion Comprehensive   General Range of Motion no range of motion deficits identified   Strength Comprehensive (MMT)   Comment, General Manual Muscle Testing (MMT) Assessment didn't formally test R UE d/t pain from recent R-sided rib fxs   Coordination   Upper Extremity Coordination No deficits were identified   Bed Mobility   Comment (Bed Mobility) SBA w/bed rail & effort   Transfers   Transfer Comments SBA/CGA   Balance   Balance Comments slightly unsteady w/o walker; states planning to use @ home.  (hypotensive response to activity-stated he was \"dizzy\")   Clinical Impression   Criteria for Skilled Therapeutic Interventions Met (OT) Yes, treatment indicated   OT Diagnosis decreased ADLs   OT Problem List-Impairments impacting ADL activity tolerance impaired;balance;mobility   Assessment of Occupational Performance 1-3 Performance Deficits   Identified Performance Deficits activity tolerance, balance, functional mob.   Planned Therapy Interventions " (OT) ADL retraining;balance training;transfer training;progressive activity/exercise;home program guidelines   Clinical Decision Making Complexity (OT) low complexity   Risk & Benefits of therapy have been explained evaluation/treatment results reviewed;care plan/treatment goals reviewed;patient   OT Discharge Planning   OT Discharge Recommendation (DC Rec) home with assist  (pending progress)   OT Brief overview of current status just worked w/PT but still agreeable to participate-limited d/t symptomatic BP.   Therapy Certification   Start of Care Date 03/08/22   Certification date from 03/08/22   Certification date to 03/14/22   Medical Diagnosis pre-syncope   Total Evaluation Time (Minutes)   Total Evaluation Time (Minutes) 10   OT Goals   Therapy Frequency (OT) Daily   OT Predicated Duration/Target Date for Goal Attainment 03/14/22   OT Goals Hygiene/Grooming;Transfers;OT Goal 1   OT: Hygiene/Grooming supervision/stand-by assist   OT: Transfer Supervision/stand-by assist   OT: Goal 1 Pt to demo > 7 minutes standing tolerance SBA in prep ADLs.

## 2022-03-08 NOTE — CONSULTS
Care Management Initial Consult    General Information  Assessment completed with: Patient, Children, pt and dtr Cirilo Huggins  Type of CM/SW Visit: Initial Assessment    Primary Care Provider verified and updated as needed: Yes   Readmission within the last 30 days: no previous admission in last 30 days   Return Category: Progression of disease  Reason for Consult: discharge planning  Advance Care Planning: Advance Care Planning Reviewed: no concerns identified, verified with patient          Communication Assessment  Patient's communication style: spoken language (English or Bilingual)             Cognitive  Cognitive/Neuro/Behavioral:                        Living Environment:   People in home: spouse     Current living Arrangements: condominium      Able to return to prior arrangements: yes       Family/Social Support:  Care provided by: self, spouse/significant other  Provides care for: no one  Marital Status:   Wife, Children          Description of Support System: Supportive, Involved    Support Assessment: Adequate family and caregiver support, Adequate social supports    Current Resources:   Patient receiving home care services: Yes  Skilled Home Care Services: Skilled Nursing  Community Resources: Home Care, DME  Equipment currently used at home: cane, straight  Supplies currently used at home: None    Employment/Financial:  Employment Status:          Financial Concerns: No concerns identified   Referral to Financial Worker: No       Lifestyle & Psychosocial Needs:  Social Determinants of Health     Tobacco Use: Low Risk      Smoking Tobacco Use: Never Smoker     Smokeless Tobacco Use: Never Used   Alcohol Use: Not on file   Financial Resource Strain: Not on file   Food Insecurity: Not on file   Transportation Needs: Not on file   Physical Activity: Not on file   Stress: Not on file   Social Connections: Not on file   Intimate Partner Violence: Not on file   Depression: Not at risk     PHQ-2 Score:  1   Housing Stability: Not on file       Functional Status:  Prior to admission patient needed assistance:   Dependent ADLs:: Ambulation-cane  Dependent IADLs:: Cleaning, Cooking, Meal Preparation, Transportation, Laundry  Assesssment of Functional Status: Not at baseline with ADL Functioning, Not at baseline with mobility, Not at  functional baseline    Mental Health Status:  Mental Health Status: No Current Concerns       Chemical Dependency Status:                Values/Beliefs:  Spiritual, Cultural Beliefs, Shinto Practices, Values that affect care:                 Additional Information:  KIARA assessed, lives w/wife and has visiting [facility] weekly, dtr Jose Manuel Huggins [phone] will transport. Discussed GOMEZ.      Angelo Brown RN

## 2022-03-08 NOTE — PROGRESS NOTES
[facility]      OUTPATIENT PHYSICAL THERAPY EVALUATION  PLAN OF TREATMENT FOR OUTPATIENT REHABILITATION  (COMPLETE FOR INITIAL CLAIMS ONLY)  Patient's Last Name, First Name, M.I.  YOB: 1926  MargaritoLionel                        Provider's Name  [facility] Medical Record No.  [id number]                               Onset Date:  03/07/22   Start of Care Date:  03/08/22      Type:     _X_PT   ___OT   ___SLP Medical Diagnosis:  admit unsteady gait, presyncopal,episode, weakness                        PT Diagnosis:  decreased mobility    Visits from SOC:  1   _________________________________________________________________________________  Plan of Treatment/Functional Goals    Planned Interventions: transfer training, bed mobility training, gait training, balance training     Goals: See Physical Therapy Goals on Care Plan in [employer] electronic health record.    Therapy Frequency: Daily  Predicted Duration of Therapy Intervention: 03/14/22  _________________________________________________________________________________    I CERTIFY THE NEED FOR THESE SERVICES FURNISHED UNDER        THIS PLAN OF TREATMENT AND WHILE UNDER MY CARE     (Physician co-signature of this document indicates review and certification of the therapy plan).                Certification date from: 03/08/22, Certification date to: 03/14/22    Referring Physician: Dr. Philomena Reyes            Initial Assessment        See Physical Therapy evaluation dated 03/08/22 in Epic electronic health record.

## 2022-03-08 NOTE — PROGRESS NOTES
03/08/22 1415   Quick Adds   Quick Adds Certification   Type of Visit Initial PT Evaluation       Present no   Living Environment   People in Home spouse  (wife pulm issues and cognitive decline)   Current Living Arrangements condominium   Home Accessibility   (elevator )   Self-Care   Usual Activity Tolerance good   Equipment Currently Used at Home cane, straight;walker, rolling  (normally no AD)   Fall history within last six months yes   Number of times patient has fallen within last six months 4   Activity/Exercise/Self-Care Comment I ADLS, laundry, pt drives - does grocery shopping.Pt reports they order some meals and have visiting angels 1x/wk for cleaning     General Information   Onset of Illness/Injury or Date of Surgery 03/07/22   Referring Physician Dr. Philomena Reyes   Patient/Family Therapy Goals Statement (PT) return home    Pertinent History of Current Problem (include personal factors and/or comorbidities that impact the POC) admit: unsteady gait, presyncopal epeisode, weakness   General Observations pt in bed, reports still feeling off but wants to try   Cognition   Orientation Status (Cognition) oriented x 3   Cognitive Status Comments pt [facility]    Pain Assessment   Patient Currently in Pain No   Range of Motion (ROM)   ROM Comment BLEs WFL   Strength (Manual Muscle Testing)   Strength Comments BLEs WFL for ambuation    Bed Mobility   Bed Mobility Limitations other (see comments)  (dizziness)   Assistive Device (Bed Mobility) other (see comments)  (HOB elevated )   Comment, (Bed Mobility) SBA, pt dizzy, improved with sitting    Transfers   Impairments Contributing to Impaired Transfers other (see comments);impaired balance  (dizziness)   Comment, (Transfers) pt CGA   Gait/Stairs (Locomotion)   [place] Level (Gait) contact guard;1 person to manage equipment  (1 person to follow with chair )   Assistive Device (Gait) walker, front-wheeled   Distance in Feet (Required for  LE Total Joints) 120'   Pattern (Gait) step-through   Comment, (Gait/Stairs) pt reports with FWW feels better , some dizziness when up    Balance   Balance Comments 1 small LOB with first sit<>stand backwards , pt minAx1 to recover    Clinical Impression   Criteria for Skilled Therapeutic Intervention Yes, treatment indicated   PT Diagnosis (PT) decreased mobility    Influenced by the following impairments dizziness, balance issues    Functional limitations due to impairments decreased independent ambulation    Clinical Presentation (PT Evaluation Complexity) Evolving/Changing   Clinical Presentation Rationale per medical diagnosis   Clinical Decision Making (Complexity) moderate complexity   Planned Therapy Interventions (PT) transfer training;bed mobility training;gait training;balance training   Anticipated Equipment Needs at Discharge (PT) walker, rolling   Risk & Benefits of therapy have been explained evaluation/treatment results reviewed   PT Discharge Planning   PT Discharge Recommendation (DC Rec) home with assist;home with home care physical therapy;Transitional Care Facility  (pending progress with mobility otherwise may need TCU)   PT Rationale for DC Rec pt CGA/minAx1 with transfers, gait w/ FWW    Therapy Certification   Start of care date 03/08/22   Certification date from 03/08/22   Certification date to 03/14/22   Medical Diagnosis admit unsteady gait, presyncopal,episode, weakness   Total Evaluation Time   Total Evaluation Time (Minutes) 10   Physical Therapy Goals   PT Frequency Daily   PT Predicated Duration/Target Date for Goal Attainment 03/14/22   PT Goals Bed Mobility;Transfers;Gait   PT: Bed Mobility Modified independent;Supine to/from sit   PT: Transfers Modified independent;Sit to/from stand;Bed to/from chair;Assistive device   PT: Gait Modified independent;150 feet;Rolling walker

## 2022-03-09 ENCOUNTER — APPOINTMENT (OUTPATIENT)
Dept: PHYSICAL THERAPY | Facility: HOSPITAL | Age: 87
End: 2022-03-09
Payer: COMMERCIAL

## 2022-03-09 ENCOUNTER — APPOINTMENT (OUTPATIENT)
Dept: OCCUPATIONAL THERAPY | Facility: HOSPITAL | Age: 87
End: 2022-03-09
Payer: COMMERCIAL

## 2022-03-09 LAB
GLUCOSE BLDC GLUCOMTR-MCNC: 111 MG/DL (ref 70–99)
GLUCOSE BLDC GLUCOMTR-MCNC: 150 MG/DL (ref 70–99)
GLUCOSE BLDC GLUCOMTR-MCNC: 192 MG/DL (ref 70–99)
GLUCOSE BLDC GLUCOMTR-MCNC: 195 MG/DL (ref 70–99)

## 2022-03-09 PROCEDURE — 99226 PR SUBSEQUENT OBSERVATION CARE,LEVEL III: CPT | Performed by: INTERNAL MEDICINE

## 2022-03-09 PROCEDURE — 82962 GLUCOSE BLOOD TEST: CPT

## 2022-03-09 PROCEDURE — 97116 GAIT TRAINING THERAPY: CPT | Mod: GP

## 2022-03-09 PROCEDURE — 250N000013 HC RX MED GY IP 250 OP 250 PS 637: Performed by: STUDENT IN AN ORGANIZED HEALTH CARE EDUCATION/TRAINING PROGRAM

## 2022-03-09 PROCEDURE — 96376 TX/PRO/DX INJ SAME DRUG ADON: CPT

## 2022-03-09 PROCEDURE — 96372 THER/PROPH/DIAG INJ SC/IM: CPT | Performed by: FAMILY MEDICINE

## 2022-03-09 PROCEDURE — G0378 HOSPITAL OBSERVATION PER HR: HCPCS

## 2022-03-09 PROCEDURE — 97535 SELF CARE MNGMENT TRAINING: CPT | Mod: GO

## 2022-03-09 PROCEDURE — 250N000011 HC RX IP 250 OP 636: Performed by: FAMILY MEDICINE

## 2022-03-09 PROCEDURE — 97129 THER IVNTJ 1ST 15 MIN: CPT | Mod: GO

## 2022-03-09 PROCEDURE — 97530 THERAPEUTIC ACTIVITIES: CPT | Mod: GP

## 2022-03-09 PROCEDURE — 96372 THER/PROPH/DIAG INJ SC/IM: CPT

## 2022-03-09 PROCEDURE — 97110 THERAPEUTIC EXERCISES: CPT | Mod: GP

## 2022-03-09 PROCEDURE — 250N000013 HC RX MED GY IP 250 OP 250 PS 637: Performed by: INTERNAL MEDICINE

## 2022-03-09 PROCEDURE — 97130 THER IVNTJ EA ADDL 15 MIN: CPT | Mod: GO

## 2022-03-09 PROCEDURE — 250N000013 HC RX MED GY IP 250 OP 250 PS 637: Performed by: FAMILY MEDICINE

## 2022-03-09 RX ORDER — IRBESARTAN 150 MG/1
150 TABLET ORAL ONCE
Status: COMPLETED | OUTPATIENT
Start: 2022-03-09 | End: 2022-03-09

## 2022-03-09 RX ORDER — IRBESARTAN 300 MG/1
300 TABLET ORAL DAILY
Status: DISCONTINUED | OUTPATIENT
Start: 2022-03-10 | End: 2022-03-10 | Stop reason: HOSPADM

## 2022-03-09 RX ADMIN — LIDOCAINE: 50 OINTMENT TOPICAL at 16:35

## 2022-03-09 RX ADMIN — TRAZODONE HYDROCHLORIDE 50 MG: 50 TABLET ORAL at 21:38

## 2022-03-09 RX ADMIN — METOPROLOL SUCCINATE 12.5 MG: 25 TABLET, EXTENDED RELEASE ORAL at 08:49

## 2022-03-09 RX ADMIN — TAMSULOSIN HYDROCHLORIDE 0.4 MG: 0.4 CAPSULE ORAL at 08:46

## 2022-03-09 RX ADMIN — LIDOCAINE 1 PATCH: 246 PATCH TOPICAL at 19:54

## 2022-03-09 RX ADMIN — LIDOCAINE: 50 OINTMENT TOPICAL at 08:49

## 2022-03-09 RX ADMIN — INSULIN ASPART 2 UNITS: 100 INJECTION, SOLUTION INTRAVENOUS; SUBCUTANEOUS at 12:42

## 2022-03-09 RX ADMIN — ACETAMINOPHEN 650 MG: 325 TABLET ORAL at 08:47

## 2022-03-09 RX ADMIN — IRBESARTAN 150 MG: 150 TABLET, FILM COATED ORAL at 08:48

## 2022-03-09 RX ADMIN — ATORVASTATIN CALCIUM 80 MG: 40 TABLET, FILM COATED ORAL at 21:38

## 2022-03-09 RX ADMIN — ACETAMINOPHEN 650 MG: 325 TABLET ORAL at 14:51

## 2022-03-09 RX ADMIN — HYDRALAZINE HYDROCHLORIDE 10 MG: 20 INJECTION INTRAMUSCULAR; INTRAVENOUS at 19:55

## 2022-03-09 RX ADMIN — LIDOCAINE: 50 OINTMENT TOPICAL at 12:42

## 2022-03-09 RX ADMIN — INSULIN ASPART 2 UNITS: 100 INJECTION, SOLUTION INTRAVENOUS; SUBCUTANEOUS at 17:37

## 2022-03-09 RX ADMIN — IRBESARTAN 150 MG: 150 TABLET, FILM COATED ORAL at 16:45

## 2022-03-09 RX ADMIN — LEVOTHYROXINE SODIUM 125 MCG: 125 TABLET ORAL at 08:48

## 2022-03-09 RX ADMIN — METOPROLOL SUCCINATE 12.5 MG: 25 TABLET, EXTENDED RELEASE ORAL at 20:03

## 2022-03-09 RX ADMIN — ASPIRIN 81 MG: 81 TABLET, COATED ORAL at 08:47

## 2022-03-09 RX ADMIN — ENOXAPARIN SODIUM 40 MG: 40 INJECTION SUBCUTANEOUS at 19:55

## 2022-03-09 RX ADMIN — Medication 1000 MCG: at 08:47

## 2022-03-09 RX ADMIN — MIRTAZAPINE 7.5 MG: 7.5 TABLET, FILM COATED ORAL at 21:38

## 2022-03-09 RX ADMIN — ACETAMINOPHEN 650 MG: 325 TABLET ORAL at 20:01

## 2022-03-09 RX ADMIN — LIDOCAINE: 50 OINTMENT TOPICAL at 19:54

## 2022-03-09 NOTE — PLAN OF CARE
"PRIMARY DIAGNOSIS: \"GENERIC\" NURSING  OUTPATIENT/OBSERVATION GOALS TO BE MET BEFORE DISCHARGE:  ADLs back to baseline: Yes    Activity and level of assistance: Up with standby assistance.    Pain status: Improved with use of alternative comfort measures i.e.: lidocaine patch, cream, acetaminophen    Return to near baseline physical activity: Yes     Discharge Planner Nurse   Safe discharge environment identified:  Pt desires to go home, family not in agreement. Care manager involved and discussing options as discharge would be AMA at this time  Barriers to discharge: Yes need safe discharge plan.        Entered by: Julia Corona 03/09/2022 3:03 PM     Please review provider order for any additional goals.   Nurse to notify provider when observation goals have been met and patient is ready for discharge.Goal Outcome Evaluation:                      "

## 2022-03-09 NOTE — UTILIZATION REVIEW
Continued stay Observation     Concurrent stay review; Secondary Review Determination         Under the authority of the Utilization Management Committee, the utilization review process indicated a secondary review on the above patient.  The review outcome is based on review of the medical records, discussions with staff, and applying clinical experience noted on the date of the review.          (x) Observation Status Appropriate - Concurrent stay review    RATIONALE FOR DETERMINATION     89-year-old male with CAD, HTN, DM 2 presented with presyncopal episode and generalized weakness and adult failure to thrive, mild hyponatremia, rib fractures, and presyncope. CTA head and neck without acute changes. Symptoms are suspected from vasovagal response.  Work-up for cardiac cause so far negative.  No evidence of infection.  Will continue with BP management. SW is working with pt and family to find a safe discharge placement.    Patient is clinically improving and there is no clear indication to change patient's status to inpatient. The severity of illness, intensity of service provided, expected LOS and risk for adverse outcome make the care appropriate for observation.    The information on this document is developed by the utilization review team in order for the business office to ensure compliance.  This only denotes the appropriateness of proper admission status and does not reflect the quality of care rendered.         The definitions of Inpatient Status and Observation Status used in making the determination above are those provided in the CMS Coverage Manual, Chapter 1 and Chapter 6, section 70.4.      Sincerely,   Leroy Hodge MD    Utilization Review  Physician Advisor  [facility].

## 2022-03-09 NOTE — PLAN OF CARE
Problem: Hypertension Comorbidity  Goal: Blood Pressure in Desired Range  Outcome: Ongoing, Not Progressing   Goal Outcome Evaluation:        Pt complains of pain in right ribcage with activity 7/10 and at rest 4/10. Controlled with rest and lidocaine patch/gel. VSS with exception to HTN. Asymptomatic. With reassessment BP had returned to controlled pressure, no PRN given. Blood glucose 116 at HS. Slept between cares. No additional concerns tonight.     Jackson Ashby RN

## 2022-03-09 NOTE — PROGRESS NOTES
[facility]    Medicine Progress Note - Hospitalist Service    Date of Admission:  3/7/2022    Assessment & Plan          89-year-old male with CAD, HTN, DM 2 presented with presyncopal episode and generalized weakness and adult failure to thrive, mild hyponatremia.     Active Problems:    Type 2 diabetes mellitus (H)    CAD (coronary artery disease)    Hypothyroidism    Unsteady gait    Generalized muscle weakness    Pre-syncope    Adult failure to thrive    Asymptomatic hypertensive urgency    Mood disorder (H)    Hyponatremia    BPH (benign prostatic hyperplasia)    Leukopenia    Normocytic anemia     Present on Admission:    Unsteady gait, fall at home    Generalized muscle weakness    Type 2 diabetes mellitus (H)    Hypothyroidism     Presyncope  -Patient endorsed some weakness and presyncope at home but denied any loss of consciousness or hitting his head.  CTA head and neck without intracranial hemorrhage, mass, acute infarct but with mild diffuse cerebral parenchymal disease and chronic hypertensive/microvascular ischemic changes.  No high-grade stenosis of head or neck.  No signs of infection, mild anemia present, blood glucose level normal, mild hyponatremia but unlikely to cause syncope.  Patient endorsed sharp right shoulder/radiating arm pain prior to becoming dizzy so highly suspect pain/vasovagal response.  -Telemetry, normal sinus rhythm  -Troponin x3 negative  -TTE 3/7: EF 55 to 60%, mild TR.  -Holding home Robaxin  -Pacemaker interrogated  -Orthostatic Bps  -PT/OT evaluation     HTN urgency  -Hold home hydrochlorothiazide given low sodium  -C/w home irbesartan, metoprolol, as needed hydralazine  -3/9 dose of irbesartan increased to 300 mg daily.    Adult failure to thrive  -Patient with weakness, inability to care for himself at home.  Leukopenia and mild normocytic anemia, only mild hyponatremia with 132, no signs of infection in urine.  Doubt infectious or cardiac  etiology  -Checked normal folate, TSH, low normal B12/added supplementation  -PT/OT, recommended TCU vs hhc; patient's MPOA reporting TCU  -Fall precautions     Right 8, 9 and 10th rib fractures, on CXR 3/7.  Eighth and ninth rib fractures a slightly displaced.  No fractures or subluxations of her right shoulder.  Likely continued musculoskeletal pain and tightness especially in the right supraspinatus, deltoids muscle that may be causing some pressure on brachial plexus.  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  Troponin negative x2, EKG with LBBB and paced.  Not sure if LBBB is new as no prior EKGs are able to be viewed.  Chest pain-free  -ASA 81 mg p.o. daily  -Atorvastatin 80 mg p.o. daily  -Metoprolol XL 12.5 mg p.o. twice daily     Hypothermia  -External warming     DM 2  -A1c 7.2  -Hold home Metformin,  would not continue at discharge as patient 89 years old with very well controlled diabetes  -Sensitive scale aspart insulin 3 times daily AC as needed  -Accu-Chek 3 times daily AC at bedtime  -Hypoglycemic protocol    Probable cognitive impairment.  OT for cognitive evaluation/slums.     Mood disorder  -Mirtazapine 7.5 mg p.o. q. Bedtime     BPH  -Tamsulosin 0.4 mg p.o. daily     Hypothyroidism  -TSH 1.55, will recheck given hyponatremia and hypothermia     Hyponatremia  -Mild, likely hypovolemic hyponatremia in the setting of adult failure to thrive.  Holding home HCTZ  -Checked TSH normal 0.92, cortisol  -Hold off on IV fluids for now as patient with severe range blood pressures     Bicytopenia  -Leukopenia with WBC 3.1, normocytic anemia with hemoglobin 11.1 and MCV of 93.  No prior CBC to compare to so unclear if this is chronic or new.       Diet: Combination Diet High Consistent Carb (75 g CHO per Meal) Diet; Low Saturated Fat Na <2400mg Diet  Room Service    DVT Prophylaxis:  "Enoxaparin (Lovenox) SQ  [place] Catheter: Not present  Central Lines: None  Cardiac Monitoring: ACTIVE order. Indication: Syncope- low cardiac risk (24 hours)  Code Status: Full Code      Disposition Plan   Expected Discharge: Anticipate discharge to TCU in 1-2 days.  Medically not stable for discharge, due to uncontrolled hypertension.     The patient's care was discussed with the Bedside Nurse and Patient.    Philomena Reyes MD  Hospitalist Service  [facility]  Securely message with the Vocera Web Console (learn more here)  Text page via [employer] Paging/Directory     Clinically Significant Risk Factors Present on Admission               # Platelet Defect: home medication list includes an antiplatelet medication   # Diabetes, type II: last A1C 7.2 % (Ref range: <=5.6 %)  # Overweight: Estimated body mass index is 27.26 kg/m  as calculated from the following:    Height as of this encounter: 1.778 m (5' 10\").    Weight as of this encounter: 86.2 kg (190 lb).    ________________________________________________________________    Interval History   Patient is new to me.  Chart reviewed.  Patient was seen and examined.  He is in good spirit today, very conversant.  He is complaining of right-sided chest pain, with deep breath only.  No retrosternal chest pain, cardiac palpitations, cough, abdominal pain, nausea, dysuria.  Called patient's wife, no answer, left voice message with callback number.    Data reviewed today: I reviewed all medications, new labs and imaging results over the last 24 hours. I personally reviewed.    Physical Exam   Vital Signs: Temp: 97.3  F (36.3  C) Temp src: Oral BP: (!) 186/89 (morning meds given) Pulse: 62   Resp: 20 SpO2: 91 % O2 Device: None (Room air)    Weight: 190 lbs 0 oz  General: Alert and oriented x 3. Not in obvious distress.  HEENT: NC, AT. Neck- supple, No JVP elevation, lymphadenopathy or thyromegaly. Trachea-central.  Chest: Clear to auscultation " bilaterally.  Tenderness to palpation over anterior/lateral mid chest wall.  Heart: S1S2 regular. No M/R/G.  Abdomen: Soft. NT, ND. No organomegaly. Bowel sounds- active.  Back: No spine tenderness. No CVA tenderness.  Extremities: No leg swelling. Peripheral pulses 2+ bilaterally.  Neuro: Cranial nerves 1-12 grossly normal. No focal neurological deficit    Data   Recent Labs   Lab 03/09/22  1208 03/09/22  0835 03/08/22  2301 03/08/22  0820 03/08/22  0635 03/07/22  1747 03/07/22  1201   WBC  --   --   --   --  2.8*  --  3.1*   HGB  --   --   --   --  10.0*  --  11.1*   MCV  --   --   --   --  91  --  93   PLT  --   --   --   --  166  --  169   NA  --   --   --   --  133*  --  132*   POTASSIUM  --   --   --   --  3.9  --  4.6   CHLORIDE  --   --   --   --  102  --  102   CO2  --   --   --   --  23  --  25   BUN  --   --   --   --  16  --  20   CR  --   --   --   --  0.74  --  0.81   ANIONGAP  --   --   --   --  8  --  5   MERLYN  --   --   --   --  9.1  --  9.0   * 111* 116*   < > 113   < > 162*   ALBUMIN  --   --   --   --   --   --  3.5   PROTTOTAL  --   --   --   --   --   --  7.1   BILITOTAL  --   --   --   --   --   --  0.7   ALKPHOS  --   --   --   --   --   --  105   ALT  --   --   --   --   --   --  35   AST  --   --   --   --   --   --  25    < > = values in this interval not displayed.     No results found for this or any previous visit (from the past 24 hour(s)).

## 2022-03-09 NOTE — PROGRESS NOTES
Care Management Follow Up    Length of Stay (days): 0    Expected Discharge Date: 03/10/2022     Concerns to be Addressed:   Discharge planning Pt  Patient plan of care discussed at interdisciplinary rounds: Yes    Anticipated Discharge Disposition:  TCU     Anticipated Discharge Services:  Skilled Nursing  Anticipated Discharge DME:      Patient/family educated on Medicare website which has current facility and service quality ratings:  yes  Education Provided on the Discharge Plan:    Patient/Family in Agreement with the Plan:      Referrals Placed by CM/SW:    Private pay costs discussed: Not applicable    Additional Information:  SW received call from pt daughter Nichole, she states pt will minimize his struggles at home. Per pt daughter pt has been confused with medication administration, recent falls, and caring for his wife.  Pt on waitlist for assisted living at [facility] with wife. Nichole requested SW contact pt daughter Jose Manuel to obtain TCU choices. SW left message for daughter Jose Manuel.       1:50 PM SW received a call from pt daughter Jose Manuel. She would like a care conference tomorrow with her sibling,pt and MD.      GERMAINE Willson

## 2022-03-10 ENCOUNTER — APPOINTMENT (OUTPATIENT)
Dept: OCCUPATIONAL THERAPY | Facility: HOSPITAL | Age: 87
End: 2022-03-10
Payer: COMMERCIAL

## 2022-03-10 ENCOUNTER — APPOINTMENT (OUTPATIENT)
Dept: PHYSICAL THERAPY | Facility: HOSPITAL | Age: 87
End: 2022-03-10
Payer: COMMERCIAL

## 2022-03-10 VITALS
RESPIRATION RATE: 20 BRPM | HEART RATE: 66 BPM | OXYGEN SATURATION: 93 % | DIASTOLIC BLOOD PRESSURE: 87 MMHG | BODY MASS INDEX: 27.2 KG/M2 | WEIGHT: 190 LBS | SYSTOLIC BLOOD PRESSURE: 197 MMHG | HEIGHT: 70 IN | TEMPERATURE: 97.6 F

## 2022-03-10 LAB
ATRIAL RATE - MUSE: 62 BPM
DIASTOLIC BLOOD PRESSURE - MUSE: NORMAL MMHG
GLUCOSE BLDC GLUCOMTR-MCNC: 126 MG/DL (ref 70–99)
GLUCOSE BLDC GLUCOMTR-MCNC: 214 MG/DL (ref 70–99)
INTERPRETATION ECG - MUSE: NORMAL
P AXIS - MUSE: 45 DEGREES
PR INTERVAL - MUSE: 166 MS
QRS DURATION - MUSE: 146 MS
QT - MUSE: 470 MS
QTC - MUSE: 477 MS
R AXIS - MUSE: 91 DEGREES
SYSTOLIC BLOOD PRESSURE - MUSE: NORMAL MMHG
T AXIS - MUSE: 18 DEGREES
VENTRICULAR RATE- MUSE: 62 BPM

## 2022-03-10 PROCEDURE — 97116 GAIT TRAINING THERAPY: CPT | Mod: GP

## 2022-03-10 PROCEDURE — 99207 PR CDG-CODE CATEGORY CHANGED: CPT | Performed by: INTERNAL MEDICINE

## 2022-03-10 PROCEDURE — 97530 THERAPEUTIC ACTIVITIES: CPT | Mod: GP

## 2022-03-10 PROCEDURE — 82962 GLUCOSE BLOOD TEST: CPT | Mod: 91

## 2022-03-10 PROCEDURE — 250N000013 HC RX MED GY IP 250 OP 250 PS 637: Performed by: FAMILY MEDICINE

## 2022-03-10 PROCEDURE — 93010 ELECTROCARDIOGRAM REPORT: CPT | Performed by: INTERNAL MEDICINE

## 2022-03-10 PROCEDURE — 96372 THER/PROPH/DIAG INJ SC/IM: CPT

## 2022-03-10 PROCEDURE — G0378 HOSPITAL OBSERVATION PER HR: HCPCS

## 2022-03-10 PROCEDURE — 99217 PR OBSERVATION CARE DISCHARGE: CPT | Performed by: INTERNAL MEDICINE

## 2022-03-10 PROCEDURE — 250N000013 HC RX MED GY IP 250 OP 250 PS 637: Performed by: INTERNAL MEDICINE

## 2022-03-10 PROCEDURE — 93005 ELECTROCARDIOGRAM TRACING: CPT

## 2022-03-10 RX ORDER — MULTIPLE VITAMINS W/ MINERALS TAB 9MG-400MCG
1 TAB ORAL DAILY
Status: DISCONTINUED | OUTPATIENT
Start: 2022-03-10 | End: 2022-03-10 | Stop reason: HOSPADM

## 2022-03-10 RX ORDER — METFORMIN HCL 500 MG
500 TABLET, EXTENDED RELEASE 24 HR ORAL
Status: DISCONTINUED | OUTPATIENT
Start: 2022-03-10 | End: 2022-03-10 | Stop reason: HOSPADM

## 2022-03-10 RX ORDER — IRBESARTAN 300 MG/1
300 TABLET ORAL DAILY
Qty: 30 TABLET | Refills: 0 | Status: SHIPPED | OUTPATIENT
Start: 2022-03-11 | End: 2022-12-05

## 2022-03-10 RX ORDER — ACETAMINOPHEN 325 MG/1
650 TABLET ORAL EVERY 6 HOURS PRN
Refills: 0 | COMMUNITY
Start: 2022-03-10 | End: 2022-03-20

## 2022-03-10 RX ORDER — MAGNESIUM 200 MG
1000 TABLET ORAL DAILY
Qty: 90 TABLET | Refills: 0 | COMMUNITY
Start: 2022-03-11 | End: 2022-06-09

## 2022-03-10 RX ORDER — VITS A,C,E/LUTEIN/MINERALS 300MCG-200
2 TABLET ORAL DAILY
Status: DISCONTINUED | OUTPATIENT
Start: 2022-03-10 | End: 2022-03-10 | Stop reason: HOSPADM

## 2022-03-10 RX ORDER — LIDOCAINE 4 G/G
1 PATCH TOPICAL EVERY 24 HOURS
Qty: 15 PATCH | Refills: 0 | Status: SHIPPED | OUTPATIENT
Start: 2022-03-10 | End: 2022-03-25

## 2022-03-10 RX ORDER — METFORMIN HCL 500 MG
500 TABLET, EXTENDED RELEASE 24 HR ORAL
Qty: 30 TABLET | Refills: 0 | Status: SHIPPED | OUTPATIENT
Start: 2022-03-10 | End: 2022-05-19

## 2022-03-10 RX ADMIN — ACETAMINOPHEN 650 MG: 325 TABLET ORAL at 13:53

## 2022-03-10 RX ADMIN — LIDOCAINE: 50 OINTMENT TOPICAL at 09:05

## 2022-03-10 RX ADMIN — Medication 2 TABLET: at 11:49

## 2022-03-10 RX ADMIN — TAMSULOSIN HYDROCHLORIDE 0.4 MG: 0.4 CAPSULE ORAL at 09:05

## 2022-03-10 RX ADMIN — LEVOTHYROXINE SODIUM 125 MCG: 125 TABLET ORAL at 09:05

## 2022-03-10 RX ADMIN — ASPIRIN 81 MG: 81 TABLET, COATED ORAL at 09:05

## 2022-03-10 RX ADMIN — INSULIN ASPART 2 UNITS: 100 INJECTION, SOLUTION INTRAVENOUS; SUBCUTANEOUS at 11:50

## 2022-03-10 RX ADMIN — Medication 1000 MCG: at 09:05

## 2022-03-10 RX ADMIN — LIDOCAINE: 50 OINTMENT TOPICAL at 11:50

## 2022-03-10 RX ADMIN — IRBESARTAN 300 MG: 300 TABLET ORAL at 09:05

## 2022-03-10 RX ADMIN — MELATONIN TAB 3 MG 3 MG: 3 TAB at 02:12

## 2022-03-10 RX ADMIN — METOPROLOL SUCCINATE 12.5 MG: 25 TABLET, EXTENDED RELEASE ORAL at 09:05

## 2022-03-10 RX ADMIN — ACETAMINOPHEN 650 MG: 325 TABLET ORAL at 09:05

## 2022-03-10 RX ADMIN — MULTIPLE VITAMINS W/ MINERALS TAB 1 TABLET: TAB at 11:49

## 2022-03-10 NOTE — PLAN OF CARE
PRIMARY DIAGNOSIS: GENERALIZED WEAKNESS    OUTPATIENT/OBSERVATION GOALS TO BE MET BEFORE DISCHARGE  1. Orthostatic performed: N/A    2. Tolerating PO medications: Yes    3. Return to near baseline physical activity: Yes    4. Cleared for discharge by consultants (if involved): No    Discharge Planner Nurse   Safe discharge environment identified: No  Barriers to discharge: Yes       Entered by: Nadira Bartlett 03/10/2022 2:25 PM     Please review provider order for any additional goals.   Nurse to notify provider when observation goals have been met and patient is ready for discharge.Goal Outcome Evaluation:

## 2022-03-10 NOTE — DISCHARGE INSTRUCTIONS
Home Care Services have been arranged for you.  Home Care Agency: [facility] Telephone:   [phone]  Services:  RN, PT and OT  Instructions:

## 2022-03-10 NOTE — PLAN OF CARE
Problem: Hypertension Comorbidity  Goal: Blood Pressure in Desired Range  Outcome: Ongoing, Not Progressing  Intervention: Maintain Blood Pressure Management  Recent Flowsheet Documentation  Taken 3/10/2022 0335 by Jackson Ashby, RN  Medication Review/Management: medications reviewed  Taken 3/10/2022 0000 by Jackson Ashby, RN  Medication Review/Management: medications reviewed   Goal Outcome Evaluation:      Patient BP has been 160s systolic overnight. Has not been in desirable range for >24H. (Lowest was 158/77.) Patient states he hasn't been sleeping well the last few nights and expresses frustrations with his home meds being stored with pharmacy, because he is not getting his normal sleeping medications, fish oil, and PreserVision. He also is unhappy that he came in for falls and pain and has been kept for HF related reasons. Active listening and reassurance given that his BP and falls may be related and that we want him to be safe before discharging to the next place. Asymptomatic when hypertensive. No PRNs given overnight.  Jackson Ashby, RN

## 2022-03-10 NOTE — DISCHARGE SUMMARY
[facility]  Hospitalist Discharge Summary      Date of Admission:  3/7/2022  Date of Discharge:  3/10/2022  Discharging Provider: Philomena Reyes MD  Discharge Service: Hospitalist Service    Discharge Diagnoses   Type 2 diabetes mellitus (H)    CAD (coronary artery disease)    Hypothyroidism    Unsteady gait, fall at home    Generalized muscle weakness    Pre-syncope    Adult failure to thrive    Asymptomatic hypertensive urgency    Mood disorder (H)    Hyponatremia    BPH (benign prostatic hyperplasia)    Leukopenia    Normocytic anemia    Follow-ups Needed After Discharge   Follow-up Appointments     Follow-up and recommended labs and tests       Follow up with primary care provider, Ziyad Wilhelm, within 7   days to evaluate medication change and for hospital follow- up.  The   following labs/tests are recommended: BMP, magnesium, CBC.       Unresulted Labs Ordered in the Past 30 Days of this Admission     Date and Time Order Name Status Description    3/7/2022  4:09 PM Vitamin B1 whole blood In process         Discharge Disposition   Discharged to home with home health care for RN/PT/OT.  Condition at discharge: Stable    Hospital Course      Lionel Chávez is a 89 year old old male with PMH of CAD, recent rib fractures on the right from fall at home, NIDDM2 presented with dizziness and presyncope.  On the morning of hospital admission on 3/7 he was using his right hand to help get himself up in bed when he felt a sharp radiating pain from the shoulder down to the hand.  He immediately began to feel dizzy and a little bit weak but was able to get up and go to the bathroom with the assistance of a cane.  As soon as he got back into bed he endorsed he lost all of his strength and immediately laid down.  Denied hitting his head or loss of consciousness, or other associated symptoms.      Syncope, likely vasovagal due to sharp pain in right shoulder and right chest, with known  right 8-10 rib fractures.  SPECT orthostatic hypotension, as patient initially does not drink water, hydrating only with diet stop drinking any milk.  CTA head and neck without intracranial hemorrhage, mass, acute infarct but with mild diffuse cerebral parenchymal disease and chronic hypertensive/microvascular ischemic changes.  No high-grade stenosis of head or neck.  No signs of infection, mild anemia present, blood glucose level normal, mild hyponatremia but unlikely to cause syncope.    Telemetry, normal sinus rhythm and ACS was ruled out with serial negative troponins.  TTE 3/7: EF 55 to 60%, mild TR. Irrigated pacemaker, negative for arrhythmia events.  PT/OT evaluated, recommended ambulation with a walker.  Patient scored 27/30 on slums.     HTN urgency  SBP persistently elevated 170-190s.  Home dose of irbesartan was doubled and is 200 mg at discharge.     Adult failure to thrive  Patient with weakness, some difficulty with self-care at home.  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  ID, ischemic and electrolyte imbalance contributing factors were excluded.  Normal folate, TSH.  Low normal B12, added supplementation.  Scored 27/30 per slums.  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     Right 8, 9 and 10th rib fractures, on CXR 3/7.  Eighth and ninth rib fractures a slightly displaced.  No fractures or subluxations of her right shoulder.  Likely continued musculoskeletal pain and tightness especially in the right supraspinatus, deltoids muscle that may be causing some pressure on brachial plexus.  Pain responding well to topical lidocaine gel.  Patient able to use walker for ambulation, without difficulty/increase in chest wall pain.    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  Troponin negative x2, EKG with LBBB and paced.  Not sure if LBBB is new  as no prior EKGs are able to be viewed. Chest pain-free.  On Aspirin, metoprolol, atorvastatin.     DM 2  Controlled with Metformin.  A1c 7.2.  In recent past patient's PCP discontinued Metformin, given extremely well controlled diabetes, elderly gentleman.  Per family, his PCP discontinued Metformin, after after which BG start rising, thus Metformin was resumed.  Discharge a change his regular Metformin 1000 mg twice a day to extended release 500 mg daily.    Insomnia.  On Remeron, trazodone.     BPH- onTamsulosin 0.4 mg p.o. daily     Hypothyroidism-checked normal TSH, given hyponatremia and hypothermia.     Hyponatremia  Mild, likely hypovolemic hyponatremia in the setting of adult failure to thrive.  Holding home HCTZ  Checked TSH normal 0.92, cortisol.  Sodium almost normalized with patient resume normal p.o.     Leukopenia, thrombocytopenia  Leukopenia with WBC 3.1, normocytic anemia with hemoglobin 11.1 and MCV of 93.  No prior CBC to compare to so unclear if this is chronic or new.  No signs of infection.  WBC is 2.8 at discharge.    Consultations This Hospital Stay   SOCIAL WORK IP CONSULT  PHYSICAL THERAPY ADULT IP CONSULT  OCCUPATIONAL THERAPY ADULT IP CONSULT  OCCUPATIONAL THERAPY ADULT IP CONSULT    Code Status   Full Code    Time Spent on this Encounter   I, Philomena Reyes MD, personally saw the patient today and spent greater than 30 minutes discharging this patient.     Philomena Reyes MD  [address] [address]  Phone: [phone]  Fax: [phone]  ________________________________________________________________    Physical Exam   Vital Signs: Temp: 97.6  F (36.4  C) Temp src: Oral BP: (!) 197/87 Pulse: 66   Resp: 20 SpO2: 93 % O2 Device: None (Room air)    Weight: 190 lbs 0 oz  General: Alert and oriented x 3. Not in obvious distress.  HEENT: NC, AT. Neck- supple, No JVP elevation, lymphadenopathy or thyromegaly.  Trachea-central.  Chest: Clear to auscultation bilaterally.  Tenderness to palpation over anterior/lateral mid chest wall.  Heart: S1S2 regular. No M/R/G.  Abdomen: Soft. NT, ND. No organomegaly. Bowel sounds- active.  Back: No spine tenderness. No CVA tenderness.  Extremities: No leg swelling. Peripheral pulses 2+ bilaterally.  Neuro: Cranial nerves 1-12 grossly normal. No focal neurological deficit       Primary Care Physician   Ziyad Wilhelm    Discharge Orders      Home Care Referral      Reason for your hospital stay    Near syncope     Follow-up and recommended labs and tests     Follow up with primary care provider, Ziyad Wilhelm, within 7 days to evaluate medication change and for hospital follow- up.  The following labs/tests are recommended: BMP, magnesium, CBC.     Activity    Your activity upon discharge: activity as tolerated     Diet    Follow this diet upon discharge: Orders Placed This Encounter      Room Service      Combination Diet High Consistent Carb (75 g CHO per Meal) Diet; Low Saturated Fat Na <2400mg Diet     Significant Results and Procedures   Results for orders placed or performed during the hospital encounter of 03/07/22   CTA Head Neck with Contrast    Narrative    EXAM: CTA  HEAD NECK WITH CONTRAST  LOCATION: [facility]  DATE/TIME: 3/7/2022 1:45 PM    INDICATION: Dizziness. Patient reports he had sudden onset of diffuse weakness, dizziness and feeling off balance while getting out of bed to use the bathroom earlier this morning.  COMPARISON: None.  CONTRAST: isovue 370 75ml  TECHNIQUE: Head and neck CT angiogram with IV contrast. Noncontrast head CT followed by axial helical CT images of the head and neck vessels obtained during the arterial phase of intravenous contrast administration. Axial 2D reconstructed images and   multiplanar 3D MIP reconstructed images of the head and neck vessels were performed by the technologist. Dose reduction  techniques were used. All stenosis measurements made according to NASCET criteria unless otherwise specified.    FINDINGS:   NONCONTRAST HEAD CT:   INTRACRANIAL CONTENTS: No intracranial hemorrhage. Mild diffuse cerebral parenchymal volume loss. No midline shift. The basilar cisterns are patent. Mild periventricular and scattered foci of deep white matter hypodensities involving both cerebral   hemispheres. No cerebellar tonsillar ectopia. No CT evidence for an acute infarct.    VISUALIZED ORBITS/SINUSES/MASTOIDS: Prior right cataract surgery. Visualized portions of the orbits are otherwise unremarkable. Mild to moderate chronic mucosal thickening of the maxillary sinuses, the inferior frontal sinuses and ethmoid air cells. No   middle ear or mastoid effusion.    BONES/SOFT TISSUES: No acute abnormality.    HEAD CTA:  ANTERIOR CIRCULATION: Mild atherosclerotic calcification of the cavernous and supraclinoid internal carotid arteries. Anterior cerebral arteries are patent without hemodynamically significant stenosis. The left middle cerebral artery is patent without   hemodynamically significant stenosis. The right middle cerebral artery is patent without hemodynamically significant stenosis. Standard [place] anatomy.    POSTERIOR CIRCULATION: No stenosis/occlusion, aneurysm, or high flow vascular malformation. Dominant left and smaller right vertebral artery contribute to a normal basilar artery.     DURAL VENOUS SINUSES: Expected enhancement of the major dural venous sinuses.    NECK CTA:  RIGHT CAROTID: Mild atherosclerotic calcification of the right carotid bulb. No measurable stenosis or dissection.    LEFT CAROTID: Mild atherosclerotic calcification of the left carotid bulb. No measurable stenosis or dissection.    VERTEBRAL ARTERIES: No focal stenosis or dissection. Dominant left and smaller right vertebral arteries.    AORTIC ARCH: Classic aortic arch anatomy. Mild atherosclerotic calcifications of  the brachiocephalic artery and the left subclavian artery.    NONVASCULAR STRUCTURES: The lung apices are clear. Circumferential disc osteophyte complexes at C5-C6 and C6-C7 causing mild spinal canal narrowing.      Impression    IMPRESSION:   HEAD CT:  1.  No intracranial hemorrhage, mass lesions, hydrocephalus or CT evidence for an acute infarct.  2.  Mild diffuse cerebral parenchymal volume loss. Presumed chronic hypertensive/microvascular ischemic white matter changes.  3.  Mild to moderate chronic mucosal thickening of the maxillary sinuses, the inferior frontal sinuses and ethmoid air cells.     HEAD CTA:   1.  No high-grade stenoses or occlusion of the major intracranial arteries. No intracranial aneurysms.    NECK CTA:  1.  No hemodynamically significant narrowing of the internal carotid arteries bilaterally based on the NASCET criteria.  2.  The vertebral arteries are patent throughout their course in the neck.   XR Shoulder Right 2 Views    Narrative    EXAM: XR SHOULDER 2 VIEW RIGHT  LOCATION: [facility]  DATE/TIME: 3/7/2022 5:56 PM    INDICATION: recent fall with right rib fractures and acute right shoulder pain  COMPARISON: None.      Impression    IMPRESSION: No acute fracture or malalignment. Moderate glenohumeral and acromioclavicular joint degenerative changes. Osteopenia.   XR Ribs Right 2 Views    Narrative    EXAM: XR RIBS RT 2 VW  LOCATION: [facility]  DATE/TIME: 3/7/2022 5:56 PM    INDICATION: rib fractures 1 month ago with increasing pain  COMPARISON: 02/06/2022      Impression    IMPRESSION: Dual-lead left subclavian venous pacer with coronary arterial stent. No hydropneumothorax. Lungs are clear. Heart and pulmonary vascularity are normal.     Right rib detail films obtained with a marker. There are acute, right lateral eighth, ninth and 10th  rib fractures. The 8th and 9th rib fractures are slightly displaced and  more angulated than before.    Echocardiogram Complete     Value    LVEF  55-60%    [facility]    [id number]  [id number]  [id number]  [id number]^KERI^[place] [address]     Name: NEENA LUO  MRN: [id number]  : 1926  Study Date: 2022 09:20 AM  Age: 89 yrs  Gender: Male  Patient Location: [facility]  Reason For Study: Syncope  Ordering Physician: KERI BEGUM  Performed By: SHRADDHA     BSA: 2.0 m2  Height: 70 in  Weight: 190 lb  HR: 74  ______________________________________________________________________________  Procedure  Complete Echo Adult.  ______________________________________________________________________________  Interpretation Summary     The left ventricle is normal in size.  Normal left ventricular wall motion  The visual ejection fraction is 55-60%.  Septal wall motion abnormality may reflect pacemaker activation.  Normal right ventricle size and systolic function.  Pacemaker catheter in the right heart  The left atrium is moderately dilated.  There is mild (1+) tricuspid regurgitation.  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  ______________________________________________________________________________  I      WMSI = 1.00     % Normal = 100     X - Cannot   0 -                      (2) - Mildly 2 -          Segments  Size  Interpret    Hyperkinetic 1 - Normal  Hypokinetic  Hypokinetic  1-2     small                                                     7 -          3-5      moderate  3 - Akinetic 4 -          5 -         6 - Akinetic Dyskinetic   6-14    large               Dyskinetic   Aneurysmal  w/scar       w/scar       15-16   diffuse     Left Ventricle  The left ventricle is normal in size. There is normal left ventricular wall  thickness. Diastolic Doppler findings (E/E' ratio and/or other parameters)  suggest left ventricular filling  pressures are normal. The visual ejection  fraction is 55-60%. Left ventricular diastolic function is indeterminate.  Grade I or early diastolic dysfunction. Normal left ventricular wall motion.  Septal wall motion abnormality may reflect pacemaker activation.     Right Ventricle  There is a catheter/pacemaker lead seen in the right ventricle. Normal right  ventricle size and systolic function. TAPSE is normal, which is consistent  with normal right ventricular systolic function.     Atria  The left atrium is moderately dilated. Pacer wire in right atrium. Right  atrial size is normal.     Mitral Valve  There is mild to moderate mitral annular calcification. The mitral valve  leaflets appear thickened, but open well. There is trace to mild mitral  regurgitation.     Tricuspid Valve  There is mild (1+) tricuspid regurgitation. Right ventricle systolic pressure  estimate normal. The right ventricular systolic pressure is approximated at  25.2 mmHg plus the right atrial pressure.     Aortic Valve  Mild aortic valve calcification is present. Mild valvular aortic stenosis.     Pulmonic Valve  The pulmonic valve is not well visualized. There is trace pulmonic valvular  regurgitation.     Vessels  The aorta root is normal. The ascending aorta is Mildly dilated. IVC diameter  <2.1 cm collapsing >50% with sniff suggests a normal RA pressure of 3 mmHg.     Pericardium  There is no pericardial effusion.     ______________________________________________________________________________  MMode/2D Measurements & Calculations  IVSd: 1.0 cm  LVIDd: 4.3 cm  LVIDs: 3.0 cm  LVPWd: 1.3 cm  FS: 29.3 %  LV mass(C)d: 174.5 grams  LV mass(C)dI: 85.4 grams/m2  Ao root diam: 3.3 cm  LA dimension: 3.7 cm     asc Aorta Diam: 3.9 cm  LA/Ao: 1.1  LVOT diam: 2.5 cm  LVOT area: 5.0 cm2  LA Volume Indexed (AL/bp): 66.0 ml/m2  RWT: 0.61     Time Measurements  MM HR: 67.0 BPM     Doppler Measurements & Calculations  MV E max alyce: 74.7 cm/sec  MV A  max royce: 109.6 cm/sec  MV E/A: 0.68     MV dec slope: 279.1 cm/sec2  MV dec time: 0.27 sec  Ao V2 max: 202.6 cm/sec  Ao max P.0 mmHg  Ao V2 mean: 135.8 cm/sec  Ao mean P.4 mmHg  Ao V2 VTI: 44.6 cm  QASIM(I,D): 2.8 cm2  QASIM(V,D): 2.5 cm2  LV V1 max P.1 mmHg  LV V1 max: 100.9 cm/sec  LV V1 VTI: 24.9 cm  SV(LVOT): 123.4 ml  SI(LVOT): 60.4 ml/m2  PA acc time: 0.14 sec  TR max royce: 251.1 cm/sec  TR max P.2 mmHg  AV Royce Ratio (DI): 0.50  QASIM Index (cm2/m2): 1.4  E/E' avg: 10.1  Lateral E/e': 9.2  Medial E/e': 11.0     ______________________________________________________________________________  Report approved by: Enzo Seay 2022 10:57 AM           Discharge Medications   Current Discharge Medication List      START taking these medications    Details   acetaminophen (TYLENOL) 325 MG tablet Take 2 tablets (650 mg) by mouth every 6 hours as needed for mild pain or other (and adjunct with moderate or severe pain or per patient request)  Refills: 0    Associated Diagnoses: Lumbar radicular pain      cyanocobalamin (VITAMIN B-12) 1000 MCG SUBL sublingual tablet Place 1 tablet (1,000 mcg) under the tongue daily  Qty: 90 tablet, Refills: 0    Associated Diagnoses: Normocytic anemia      Lidocaine (LIDOCARE) 4 % Patch Place 1 patch onto the skin every 24 hours for 15 days To prevent lidocaine toxicity, patient should be patch free for 12 hrs daily.  Qty: 15 patch, Refills: 0    Associated Diagnoses: Lumbar radicular pain      metFORMIN (GLUCOPHAGE-XR) 500 MG 24 hr tablet Take 1 tablet (500 mg) by mouth daily (with dinner)  Qty: 30 tablet, Refills: 0    Associated Diagnoses: Type 2 diabetes mellitus without complication, without long-term current use of insulin (H)         CONTINUE these medications which have CHANGED    Details   irbesartan (AVAPRO) 300 MG tablet Take 1 tablet (300 mg) by mouth daily  Qty: 30 tablet, Refills: 0    Associated Diagnoses: Asymptomatic hypertensive urgency          CONTINUE these medications which have NOT CHANGED    Details   aspirin 81 MG EC tablet [ASPIRIN 81 MG EC TABLET] Take 81 mg by mouth daily.      atorvastatin (LIPITOR) 80 MG tablet [ATORVASTATIN (LIPITOR) 80 MG TABLET] TAKE 1 TABLET BY MOUTH EVERYDAY AT BEDTIME  Qty: 90 tablet, Refills: 2    Associated Diagnoses: Other hyperlipidemia      levothyroxine (SYNTHROID, LEVOTHROID) 125 MCG tablet [LEVOTHYROXINE (SYNTHROID, LEVOTHROID) 125 MCG TABLET] TAKE 1 TABLET BY MOUTH EVERY DAY  Qty: 90 tablet, Refills: 3    Associated Diagnoses: Hypothyroidism, unspecified type      metoprolol succinate (TOPROL-XL) 25 MG [METOPROLOL SUCCINATE (TOPROL-XL) 25 MG] TAKE 1/2 TABLET BY MOUTH 2 TIMES DAILY  Qty: 90 tablet, Refills: 3    Associated Diagnoses: Essential hypertension      mirtazapine (REMERON) 7.5 MG tablet Take 7.5 mg by mouth At Bedtime      multivitamin  with lutein (OCUVITE WITH LUTEIN) CAPS per capsule Take 2 capsules by mouth daily      multivitamin (CENTRUM SILVER) tablet Take 1 tablet by mouth daily      omega-3 fatty acids-fish oil 340-1,000 mg cap [OMEGA-3 FATTY ACIDS-FISH -1,000 MG CAP] Take 1 capsule by mouth daily.       tamsulosin (FLOMAX) 0.4 MG capsule Take 0.4 mg by mouth daily      traZODone (DESYREL) 50 MG tablet Take 50 mg by mouth At Bedtime      blood glucose test (ONETOUCH VERIO TEST STRIPS) strips [BLOOD GLUCOSE TEST (ONETOUCH VERIO TEST STRIPS) STRIPS] USE TO TEST BLOOD SUGAR 1-2 TIMES DAILY.  Qty: 200 strip, Refills: 11    Associated Diagnoses: Type 2 diabetes mellitus (H)      !! lancets (ONETOUCH DELICA LANCETS) 30 gauge Misc [LANCETS (ONETOUCH DELICA LANCETS) 30 GAUGE MISC] Check blood glucose daily.  Qty: 100 each, Refills: 3    Associated Diagnoses: Type 2 diabetes mellitus (H)      !! lancets 33 gauge Misc [LANCETS 33 GAUGE MISC] 2 (two) times a day.       !! - Potential duplicate medications found. Please discuss with provider.      STOP taking these medications        "hydroCHLOROthiazide (HYDRODIURIL) 25 MG tablet Comments:   Reason for Stopping:         metFORMIN (GLUCOPHAGE) 1000 MG tablet Comments:   Reason for Stopping:         methocarbamol (ROBAXIN) 500 MG tablet Comments:   Reason for Stopping:             Allergies   Allergies   Allergen Reactions     Amiodarone Other (See Comments)     Per [facility] records \"eye changes & thyroid gland side effects\"     Philomena Reyes MD    "

## 2022-03-10 NOTE — PLAN OF CARE
Physical Therapy Discharge Summary    Reason for therapy discharge:    All goals and outcomes met, no further needs identified.    Progress towards therapy goal(s). See goals on Care Plan in [facility] electronic health record for goal details.  Goals met    Therapy recommendation(s):    Continued therapy is recommended.  Rationale/Recommendations:  to improve gait stability with no FWW.

## 2022-03-10 NOTE — PLAN OF CARE
Problem: Pain Chronic (Persistent) (Comorbidity Management)  Goal: Acceptable Pain Control and Functional Ability  Outcome: Ongoing, Progressing  Intervention: Manage Persistent Pain  Recent Flowsheet Documentation  Taken 3/9/2022 1954 by Anne Escalera, RN  Medication Review/Management: medications reviewed  Taken 3/9/2022 1635 by Anne Escalera, RN  Medication Review/Management: medications reviewed       Patient reports minimal right rib pain, states it only increases temporarily with movement. Applied lidocaine patch and cream, declines further intervention.       Problem: Plan of Care - These are the overarching goals to be used throughout the patient stay.    Goal: Optimal Comfort and Wellbeing  Outcome: Ongoing, Progressing     Patient A&Ox4, assist x1 with ambulation. Uses urinal at bedside. BP elevated this shift, gave PRN hydralazine x1 this shift, with some effectiveness. Continues on telemonitoring = sinus rhythm with BBB, prolonged Qtc and occasional PACs. Has permanent pacemaker. Ate 100% of dinner, gave sliding scale insulin per orders.

## 2022-03-10 NOTE — PLAN OF CARE
Goal Outcome Evaluation:  Pt denies pain this shift.  EKG done. LBBB noted. MD aware.  Activity encouraged.

## 2022-03-10 NOTE — PLAN OF CARE
PRIMARY DIAGNOSIS: GENERALIZED WEAKNESS    OUTPATIENT/OBSERVATION GOALS TO BE MET BEFORE DISCHARGE  1. Orthostatic performed: N/A    2. Tolerating PO medications: Yes    3. Return to near baseline physical activity: Yes    4. Cleared for discharge by consultants (if involved): No    Discharge Planner Nurse   Safe discharge environment identified: No  Barriers to discharge: Yes       Entered by: Nadira Bartlett 03/10/2022 11:27 AM     Please review provider order for any additional goals.   Nurse to notify provider when observation goals have been met and patient is ready for discharge.Goal Outcome Evaluation:

## 2022-03-10 NOTE — PROGRESS NOTES
Care Management Follow Up    Length of Stay (days): 0    Expected Discharge Date: 03/11/2022     Concerns to be Addressed: discharge planning  Patient plan of care discussed at interdisciplinary rounds: Yes    Anticipated Discharge Disposition:  Home with Home Care      Anticipated Discharge Services:  RN, PT and OT home care services  Anticipated Discharge DME:  None    Patient/family educated on Medicare website which has current facility and service quality ratings:    Education Provided on the Discharge Plan:  Yes, pt and two daughters  Patient/Family in Agreement with the Plan:  yes    Referrals Placed by CM/SW:  Home Care  Private pay costs discussed: Not applicable    Additional Information:  Chart reviewed and plan of care discussed with hospitalist.  Therapy is recommending Home PT and OT.      Met with pt to discuss discharge planning. He states he would like to return home. He is agreeable with home care services. Referrals placed.    Called and spoke with pt's daughter, Jose Manuel. She states her sister will be at hospital at 1500 and Jose Manuel will plan to come at same time and they would like to speak with hospitalist while there.  Paged hospitalist and plan to meet with pt and family at 1500.    Pt has been accepted from home care services by [facility] for start of care 3/13-3/14/2022.  Pt also accepted by [facility] with start of care 3/11-3/12/2022.      4:02 PM  Met with pt, daughters and hospitalist for family conference.  All are in agreement with discharge home today with home care services for pt.  Family plans to pursue additional services for pt's spouse separately.  Daughters will transport pt home.   Pt will discharge home with [facility] for RN, PT and OT services.   4:28 PM  Orders faxed.    Dianne Simmons RN

## 2022-03-11 ENCOUNTER — PATIENT OUTREACH (OUTPATIENT)
Dept: CARE COORDINATION | Facility: CLINIC | Age: 87
End: 2022-03-11
Payer: COMMERCIAL

## 2022-03-11 DIAGNOSIS — Z71.89 OTHER SPECIFIED COUNSELING: ICD-10-CM

## 2022-03-11 NOTE — PLAN OF CARE
Occupational Therapy Discharge Summary    Reason for therapy discharge:    Discharged to home last night.    Progress towards therapy goal(s). See goals on Care Plan in [facility] electronic health record for goal details.  Progressing towards goals.    Therapy recommendation(s):    Recommend home OT for further safety eval d/t limited OT prior to discharge home.

## 2022-03-11 NOTE — PROGRESS NOTES
Clinic Care Coordination Contact  [facility]: Post-Discharge Note  SITUATION                                                      Admission:    Admission Date: 03/07/22   Reason for Admission: presented with presyncopal episode and generalized weakness and adult failure to thrive, mild hyponatremia.  Discharge:   Discharge Date: 03/10/22  Discharge Diagnosis: presented with presyncopal episode and generalized weakness and adult failure to thrive, mild hyponatremia.    BACKGROUND                                                      Lionel Chávez is a 89 year old old male with CAD, recent rib fractures, DM2 presents with dizziness and presyncope.  He endorses the morning of 3/7 he was using his right hand to help get himself up in bed when he felt a sharp radiating pain from the shoulder down to the hand.  He immediately began to feel dizzy and a little bit weak but was able to get up and go to the bathroom with the assistance of a cane.  As soon as he got back into bed he endorsed he lost all of his strength and immediately laid down.  Denied hitting his head or loss of consciousness.  Also denied any nausea, vomiting, dysuria, diarrhea, chest pain, fevers, chills, difficulty breathing.  Patient does endorse he is having ongoing right-sided rib pain from recent fractures.  Denied ever having felt like this before having the sharp shooting pain in his right shoulder and arm.    ASSESSMENT      Enrollment  Primary Care Care Coordination Status: Declined    Discharge Assessment  How are you doing now that you are home?: Patient shares that he is doing well and has been using his walker, which is new for him. Patient shares that his children are there helping and that they just heard a room is open at [facility]. They will most likely be moving in the next few weeks. Patient declines other needs at this time and thanks  for the call.  How are your symptoms? (Red Flag symptoms escalate to triage  hotline per guidelines): Improved  Do you feel your condition is stable enough to be safe at home until your provider visit?: Yes  Does the patient have their discharge instructions? : Yes  Does the patient have questions regarding their discharge instructions? : No  Were you started on any new medications or were there changes to any of your previous medications? : Yes  Does the patient have all of their medications?: Yes  Do you have all of your needed medical supplies or equipment (DME)?  (i.e. oxygen tank, CPAP, cane, etc.): Yes  Discharge follow-up appointment scheduled within 14 calendar days? : Yes  Discharge Follow Up Appointment Date: 03/21/22  Discharge Follow Up Appointment Scheduled with?: Specialty Care Provider    Post-op ([facility] Only)  If the patient had a surgery or procedure, do they have any questions for a nurse?: No    Post-op (Clinicians Only)  Did the patient have surgery or a procedure: No  Fever: No  Chills: No        PLAN                                                      Outpatient Plan:  Follow up with primary care provider, Ziyad Wilhelm, within 7   days to evaluate medication change and for hospital follow- up.  The   following labs/tests are recommended: BMP, magnesium, CBC.       No future appointments.      For any urgent concerns, please contact our 24 hour nurse triage line: [phone] ([phone])         PATY Gaspar

## 2022-03-14 ENCOUNTER — TELEPHONE (OUTPATIENT)
Dept: FAMILY MEDICINE | Facility: CLINIC | Age: 87
End: 2022-03-14
Payer: COMMERCIAL

## 2022-03-14 LAB — VIT B1 PYROPHOSHATE BLD-SCNC: 104 NMOL/L

## 2022-03-14 NOTE — TELEPHONE ENCOUNTER
Reason for Call: Request for an order or referral:    Order or referral being requested: Skilled nursing 1 x a week times 4/ Pt/ ot /pt evail and treat    Date needed: at your convenience    Has the patient been seen by the PCP for this problem? YES    Additional comments:     Phone number Patient can be reached at:  Other phone number:  [phone]    Best Time:  any    Can we leave a detailed message on this number?  YES    Call taken on 3/14/2022 at 10:36 AM by Heike Gibbs

## 2022-03-14 NOTE — TELEPHONE ENCOUNTER
DE,  Ok for orders below (appear to be new orders)?  Please advise.  Thanks,  Jeannine Yañez RN

## 2022-03-15 ENCOUNTER — TELEPHONE (OUTPATIENT)
Dept: FAMILY MEDICINE | Facility: CLINIC | Age: 87
End: 2022-03-15
Payer: COMMERCIAL

## 2022-03-15 NOTE — TELEPHONE ENCOUNTER
Reason for Call:  Home Health Care    order with [facility]  Homecare called regarding (reason for call): faxed in order    Orders are needed for this patient. Yes    PT: N/A    OT: N/A    Skilled Nursin Time a week for 4 weeks  30- 60 minutes for Cardiopulmonary assessment, teaching use of BP Cuff, safety teaching, dietary teaching    Pt Provider: Adali Wilhelm    Phone Number Homecare Nurse can be reached at: N/A- Need Dr Signature and Fax back to  [phone]    Can we leave a detailed message on this number? Not Applicable    Phone number patient can be reached at: Other phone number:  N/A    Best Time: When signed    Call taken on 3/15/2022 at 3:47 PM by Lotus Hughes

## 2022-03-28 ENCOUNTER — MEDICAL CORRESPONDENCE (OUTPATIENT)
Dept: HEALTH INFORMATION MANAGEMENT | Facility: CLINIC | Age: 87
End: 2022-03-28
Payer: COMMERCIAL

## 2022-05-18 ENCOUNTER — APPOINTMENT (OUTPATIENT)
Dept: CT IMAGING | Facility: HOSPITAL | Age: 87
End: 2022-05-18
Attending: EMERGENCY MEDICINE
Payer: COMMERCIAL

## 2022-05-18 ENCOUNTER — APPOINTMENT (OUTPATIENT)
Dept: RADIOLOGY | Facility: HOSPITAL | Age: 87
End: 2022-05-18
Attending: EMERGENCY MEDICINE
Payer: COMMERCIAL

## 2022-05-18 ENCOUNTER — HOSPITAL ENCOUNTER (OUTPATIENT)
Facility: HOSPITAL | Age: 87
Setting detail: OBSERVATION
Discharge: HOME HEALTH | End: 2022-05-20
Attending: EMERGENCY MEDICINE | Admitting: STUDENT IN AN ORGANIZED HEALTH CARE EDUCATION/TRAINING PROGRAM
Payer: COMMERCIAL

## 2022-05-18 DIAGNOSIS — R50.9 FEVER IN ADULT: ICD-10-CM

## 2022-05-18 DIAGNOSIS — D72.819 LEUKOPENIA, UNSPECIFIED TYPE: ICD-10-CM
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
## 2022-05-18 DIAGNOSIS — E86.0 DEHYDRATION: ICD-10-CM
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
## 2022-05-18 LAB
ALBUMIN SERPL-MCNC: 3.4 G/DL (ref 3.5–5)
ALP SERPL-CCNC: 103 U/L (ref 45–120)
ALT SERPL W P-5'-P-CCNC: 27 U/L (ref 0–45)
ANION GAP SERPL CALCULATED.3IONS-SCNC: 11 MMOL/L (ref 5–18)
AST SERPL W P-5'-P-CCNC: 31 U/L (ref 0–40)
BILIRUB DIRECT SERPL-MCNC: 0.4 MG/DL
BILIRUB SERPL-MCNC: 1.1 MG/DL (ref 0–1)
BUN SERPL-MCNC: 35 MG/DL (ref 8–28)
CALCIUM SERPL-MCNC: 9.1 MG/DL (ref 8.5–10.5)
CHLORIDE BLD-SCNC: 102 MMOL/L (ref 98–107)
CO2 SERPL-SCNC: 20 MMOL/L (ref 22–31)
CREAT SERPL-MCNC: 1.4 MG/DL (ref 0.7–1.3)
ERYTHROCYTE [DISTWIDTH] IN BLOOD BY AUTOMATED COUNT: 14.2 % (ref 10–15)
GFR SERPL CREATININE-BSD FRML MDRD: 46 ML/MIN/1.73M2
GLUCOSE BLD-MCNC: 155 MG/DL (ref 70–125)
HCT VFR BLD AUTO: 29.5 % (ref 40–53)
HGB BLD-MCNC: 9.8 G/DL (ref 13.3–17.7)
LACTATE SERPL-SCNC: 1.2 MMOL/L (ref 0.7–2)
MCH RBC QN AUTO: 30.2 PG (ref 26.5–33)
MCHC RBC AUTO-ENTMCNC: 33.2 G/DL (ref 31.5–36.5)
MCV RBC AUTO: 91 FL (ref 78–100)
PLATELET # BLD AUTO: 139 10E3/UL (ref 150–450)
POTASSIUM BLD-SCNC: 4.5 MMOL/L (ref 3.5–5)
PROT SERPL-MCNC: 6.6 G/DL (ref 6–8)
RBC # BLD AUTO: 3.24 10E6/UL (ref 4.4–5.9)
SODIUM SERPL-SCNC: 133 MMOL/L (ref 136–145)
WBC # BLD AUTO: 1.8 10E3/UL (ref 4–11)

## 2022-05-18 PROCEDURE — 87636 SARSCOV2 & INF A&B AMP PRB: CPT | Performed by: EMERGENCY MEDICINE

## 2022-05-18 PROCEDURE — 72131 CT LUMBAR SPINE W/O DYE: CPT

## 2022-05-18 PROCEDURE — 82248 BILIRUBIN DIRECT: CPT | Performed by: EMERGENCY MEDICINE

## 2022-05-18 PROCEDURE — 93005 ELECTROCARDIOGRAM TRACING: CPT | Performed by: EMERGENCY MEDICINE

## 2022-05-18 PROCEDURE — 70450 CT HEAD/BRAIN W/O DYE: CPT

## 2022-05-18 PROCEDURE — 36415 COLL VENOUS BLD VENIPUNCTURE: CPT | Performed by: EMERGENCY MEDICINE

## 2022-05-18 PROCEDURE — 83605 ASSAY OF LACTIC ACID: CPT | Performed by: EMERGENCY MEDICINE

## 2022-05-18 PROCEDURE — 87040 BLOOD CULTURE FOR BACTERIA: CPT | Performed by: EMERGENCY MEDICINE

## 2022-05-18 PROCEDURE — 83690 ASSAY OF LIPASE: CPT | Performed by: EMERGENCY MEDICINE

## 2022-05-18 PROCEDURE — 85048 AUTOMATED LEUKOCYTE COUNT: CPT | Performed by: EMERGENCY MEDICINE

## 2022-05-18 PROCEDURE — 70486 CT MAXILLOFACIAL W/O DYE: CPT

## 2022-05-18 PROCEDURE — 99285 EMERGENCY DEPT VISIT HI MDM: CPT | Mod: 25

## 2022-05-18 PROCEDURE — 82310 ASSAY OF CALCIUM: CPT | Performed by: EMERGENCY MEDICINE

## 2022-05-18 PROCEDURE — C9803 HOPD COVID-19 SPEC COLLECT: HCPCS

## 2022-05-18 PROCEDURE — 73502 X-RAY EXAM HIP UNI 2-3 VIEWS: CPT

## 2022-05-18 PROCEDURE — 71045 X-RAY EXAM CHEST 1 VIEW: CPT

## 2022-05-18 PROCEDURE — 72128 CT CHEST SPINE W/O DYE: CPT

## 2022-05-18 PROCEDURE — 72125 CT NECK SPINE W/O DYE: CPT

## 2022-05-18 PROCEDURE — 85014 HEMATOCRIT: CPT | Performed by: EMERGENCY MEDICINE

## 2022-05-18 RX ORDER — ACETAMINOPHEN 325 MG/1
650 TABLET ORAL ONCE
Status: COMPLETED | OUTPATIENT
Start: 2022-05-18 | End: 2022-05-19

## 2022-05-18 ASSESSMENT — ENCOUNTER SYMPTOMS
DIARRHEA: 0
NUMBNESS: 0
SHORTNESS OF BREATH: 0
COUGH: 0
ABDOMINAL PAIN: 0
NAUSEA: 0
HEADACHES: 0
VOMITING: 0
FEVER: 0
WEAKNESS: 0

## 2022-05-19 ENCOUNTER — APPOINTMENT (OUTPATIENT)
Dept: CT IMAGING | Facility: HOSPITAL | Age: 87
End: 2022-05-19
Attending: EMERGENCY MEDICINE
Payer: COMMERCIAL
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
## 2022-05-19 LAB
ALBUMIN UR-MCNC: 20 MG/DL
APPEARANCE UR: CLEAR
ATRIAL RATE - MUSE: 69 BPM
BACTERIA #/AREA URNS HPF: ABNORMAL /HPF
BASOPHILS # BLD AUTO: 0 10E3/UL (ref 0–0.2)
BASOPHILS # BLD MANUAL: 0 10E3/UL (ref 0–0.2)
BASOPHILS NFR BLD AUTO: 0 %
BASOPHILS NFR BLD MANUAL: 2 %
BILIRUB UR QL STRIP: NEGATIVE
COLOR UR AUTO: YELLOW
DIASTOLIC BLOOD PRESSURE - MUSE: NORMAL MMHG
EOSINOPHIL # BLD AUTO: 0 10E3/UL (ref 0–0.7)
EOSINOPHIL # BLD MANUAL: 0 10E3/UL (ref 0–0.7)
EOSINOPHIL NFR BLD AUTO: 0 %
EOSINOPHIL NFR BLD MANUAL: 0 %
ERYTHROCYTE [DISTWIDTH] IN BLOOD BY AUTOMATED COUNT: 14 % (ref 10–15)
FLUAV RNA SPEC QL NAA+PROBE: NEGATIVE
FLUBV RNA RESP QL NAA+PROBE: NEGATIVE
GLUCOSE UR STRIP-MCNC: NEGATIVE MG/DL
HCT VFR BLD AUTO: 30.6 % (ref 40–53)
HGB BLD-MCNC: 10.2 G/DL (ref 13.3–17.7)
HGB UR QL STRIP: NEGATIVE
HOLD SPECIMEN: NORMAL
HYALINE CASTS: 3 /LPF
IMM GRANULOCYTES # BLD: 0 10E3/UL
IMM GRANULOCYTES NFR BLD: 1 %
INTERPRETATION ECG - MUSE: NORMAL
KETONES UR STRIP-MCNC: NEGATIVE MG/DL
LEUKOCYTE ESTERASE UR QL STRIP: NEGATIVE
LIPASE SERPL-CCNC: 11 U/L (ref 0–52)
LYMPHOCYTES # BLD AUTO: 0.3 10E3/UL (ref 0.8–5.3)
LYMPHOCYTES # BLD MANUAL: 0.4 10E3/UL (ref 0.8–5.3)
LYMPHOCYTES NFR BLD AUTO: 18 %
LYMPHOCYTES NFR BLD MANUAL: 27 %
MCH RBC QN AUTO: 29.8 PG (ref 26.5–33)
MCHC RBC AUTO-ENTMCNC: 33.3 G/DL (ref 31.5–36.5)
MCV RBC AUTO: 90 FL (ref 78–100)
METAMYELOCYTES # BLD MANUAL: 0 10E3/UL
METAMYELOCYTES NFR BLD MANUAL: 2 %
MONOCYTES # BLD AUTO: 0.4 10E3/UL (ref 0–1.3)
MONOCYTES # BLD MANUAL: 0.4 10E3/UL (ref 0–1.3)
MONOCYTES NFR BLD AUTO: 20 %
MONOCYTES NFR BLD MANUAL: 26 %
MUCOUS THREADS #/AREA URNS LPF: PRESENT /LPF
MYELOCYTES # BLD MANUAL: 0 10E3/UL
MYELOCYTES NFR BLD MANUAL: 2 %
NEUTROPHILS # BLD AUTO: 1.1 10E3/UL (ref 1.6–8.3)
NEUTROPHILS # BLD MANUAL: 0.6 10E3/UL (ref 1.6–8.3)
NEUTROPHILS NFR BLD AUTO: 61 %
NEUTROPHILS NFR BLD MANUAL: 41 %
NITRATE UR QL: NEGATIVE
NRBC # BLD AUTO: 0 10E3/UL
NRBC BLD AUTO-RTO: 0 /100
P AXIS - MUSE: 60 DEGREES
PH UR STRIP: 5 [PH] (ref 5–7)
PLAT MORPH BLD: ABNORMAL
PLATELET # BLD AUTO: 128 10E3/UL (ref 150–450)
PR INTERVAL - MUSE: 162 MS
QRS DURATION - MUSE: 144 MS
QT - MUSE: 452 MS
QTC - MUSE: 484 MS
R AXIS - MUSE: 94 DEGREES
RBC # BLD AUTO: 3.42 10E6/UL (ref 4.4–5.9)
RBC MORPH BLD: ABNORMAL
RBC URINE: 6 /HPF
RETICS # AUTO: 0.04 10E6/UL (ref 0.01–0.11)
RETICS/RBC NFR AUTO: 1.2 % (ref 0.8–2.7)
SARS-COV-2 RNA RESP QL NAA+PROBE: NEGATIVE
SP GR UR STRIP: 1.02 (ref 1–1.03)
SYSTOLIC BLOOD PRESSURE - MUSE: NORMAL MMHG
T AXIS - MUSE: 73 DEGREES
UROBILINOGEN UR STRIP-MCNC: <2 MG/DL
VENTRICULAR RATE- MUSE: 69 BPM
WBC # BLD AUTO: 1.5 10E3/UL (ref 4–11)
WBC # BLD AUTO: 1.8 10E3/UL (ref 4–11)
WBC URINE: 2 /HPF

## 2022-05-19 PROCEDURE — 85045 AUTOMATED RETICULOCYTE COUNT: CPT | Performed by: INTERNAL MEDICINE

## 2022-05-19 PROCEDURE — 250N000011 HC RX IP 250 OP 636: Performed by: INTERNAL MEDICINE

## 2022-05-19 PROCEDURE — G0378 HOSPITAL OBSERVATION PER HR: HCPCS

## 2022-05-19 PROCEDURE — 74177 CT ABD & PELVIS W/CONTRAST: CPT

## 2022-05-19 PROCEDURE — 81001 URINALYSIS AUTO W/SCOPE: CPT | Performed by: EMERGENCY MEDICINE

## 2022-05-19 PROCEDURE — 250N000013 HC RX MED GY IP 250 OP 250 PS 637: Performed by: EMERGENCY MEDICINE

## 2022-05-19 PROCEDURE — 258N000003 HC RX IP 258 OP 636: Performed by: INTERNAL MEDICINE

## 2022-05-19 PROCEDURE — 258N000003 HC RX IP 258 OP 636: Performed by: EMERGENCY MEDICINE

## 2022-05-19 PROCEDURE — 36415 COLL VENOUS BLD VENIPUNCTURE: CPT | Performed by: INTERNAL MEDICINE

## 2022-05-19 PROCEDURE — 85007 BL SMEAR W/DIFF WBC COUNT: CPT | Performed by: INTERNAL MEDICINE

## 2022-05-19 PROCEDURE — 99219 PR INITIAL OBSERVATION CARE,LEVEL II: CPT | Performed by: INTERNAL MEDICINE

## 2022-05-19 PROCEDURE — 85027 COMPLETE CBC AUTOMATED: CPT | Performed by: INTERNAL MEDICINE

## 2022-05-19 PROCEDURE — 96374 THER/PROPH/DIAG INJ IV PUSH: CPT | Mod: 59

## 2022-05-19 PROCEDURE — 96376 TX/PRO/DX INJ SAME DRUG ADON: CPT

## 2022-05-19 PROCEDURE — 250N000011 HC RX IP 250 OP 636: Performed by: EMERGENCY MEDICINE

## 2022-05-19 PROCEDURE — 99203 OFFICE O/P NEW LOW 30 MIN: CPT | Performed by: INTERNAL MEDICINE

## 2022-05-19 PROCEDURE — 99204 OFFICE O/P NEW MOD 45 MIN: CPT | Mod: GC | Performed by: PSYCHIATRY & NEUROLOGY

## 2022-05-19 PROCEDURE — 85060 BLOOD SMEAR INTERPRETATION: CPT | Performed by: PATHOLOGY

## 2022-05-19 PROCEDURE — 250N000013 HC RX MED GY IP 250 OP 250 PS 637: Performed by: INTERNAL MEDICINE

## 2022-05-19 PROCEDURE — 87086 URINE CULTURE/COLONY COUNT: CPT | Performed by: EMERGENCY MEDICINE

## 2022-05-19 RX ORDER — METFORMIN HCL 500 MG
500 TABLET, EXTENDED RELEASE 24 HR ORAL
Status: DISCONTINUED | OUTPATIENT
Start: 2022-05-19 | End: 2022-05-20 | Stop reason: HOSPADM

## 2022-05-19 RX ORDER — SODIUM CHLORIDE 9 MG/ML
INJECTION, SOLUTION INTRAVENOUS CONTINUOUS
Status: DISCONTINUED | OUTPATIENT
Start: 2022-05-19 | End: 2022-05-20 | Stop reason: HOSPADM

## 2022-05-19 RX ORDER — METOPROLOL SUCCINATE 25 MG/1
25 TABLET, EXTENDED RELEASE ORAL 2 TIMES DAILY
Status: DISCONTINUED | OUTPATIENT
Start: 2022-05-19 | End: 2022-05-19

## 2022-05-19 RX ORDER — IOPAMIDOL 755 MG/ML
75 INJECTION, SOLUTION INTRAVASCULAR ONCE
Status: COMPLETED | OUTPATIENT
Start: 2022-05-19 | End: 2022-05-19

## 2022-05-19 RX ORDER — ACETAMINOPHEN 500 MG
1000 TABLET ORAL EVERY 6 HOURS PRN
Status: DISCONTINUED | OUTPATIENT
Start: 2022-05-19 | End: 2022-05-19

## 2022-05-19 RX ORDER — METFORMIN HCL 500 MG
500 TABLET, EXTENDED RELEASE 24 HR ORAL
COMMUNITY

## 2022-05-19 RX ORDER — PIPERACILLIN SODIUM, TAZOBACTAM SODIUM 3; .375 G/15ML; G/15ML
3.38 INJECTION, POWDER, LYOPHILIZED, FOR SOLUTION INTRAVENOUS EVERY 8 HOURS
Status: DISCONTINUED | OUTPATIENT
Start: 2022-05-19 | End: 2022-05-19

## 2022-05-19 RX ORDER — HYDROCHLOROTHIAZIDE 25 MG/1
12.5 TABLET ORAL DAILY
COMMUNITY

## 2022-05-19 RX ORDER — ACETAMINOPHEN 500 MG
1000 TABLET ORAL EVERY 6 HOURS PRN
COMMUNITY

## 2022-05-19 RX ORDER — TAMSULOSIN HYDROCHLORIDE 0.4 MG/1
0.4 CAPSULE ORAL DAILY
Status: DISCONTINUED | OUTPATIENT
Start: 2022-05-19 | End: 2022-05-20 | Stop reason: HOSPADM

## 2022-05-19 RX ORDER — PIPERACILLIN SODIUM, TAZOBACTAM SODIUM 3; .375 G/15ML; G/15ML
3.38 INJECTION, POWDER, LYOPHILIZED, FOR SOLUTION INTRAVENOUS ONCE
Status: COMPLETED | OUTPATIENT
Start: 2022-05-19 | End: 2022-05-19

## 2022-05-19 RX ORDER — HYDROCHLOROTHIAZIDE 12.5 MG/1
12.5 TABLET ORAL DAILY
Status: DISCONTINUED | OUTPATIENT
Start: 2022-05-19 | End: 2022-05-20 | Stop reason: HOSPADM

## 2022-05-19 RX ORDER — MULTIPLE VITAMINS W/ MINERALS TAB 9MG-400MCG
1 TAB ORAL DAILY
Status: DISCONTINUED | OUTPATIENT
Start: 2022-05-19 | End: 2022-05-20 | Stop reason: HOSPADM

## 2022-05-19 RX ORDER — SODIUM CHLORIDE 9 MG/ML
INJECTION, SOLUTION INTRAVENOUS ONCE
Status: DISCONTINUED | OUTPATIENT
Start: 2022-05-19 | End: 2022-05-20 | Stop reason: HOSPADM

## 2022-05-19 RX ORDER — IRBESARTAN 150 MG/1
150 TABLET ORAL DAILY
Status: DISCONTINUED | OUTPATIENT
Start: 2022-05-19 | End: 2022-05-20 | Stop reason: HOSPADM

## 2022-05-19 RX ORDER — MIRTAZAPINE 7.5 MG/1
7.5 TABLET, FILM COATED ORAL AT BEDTIME
Status: DISCONTINUED | OUTPATIENT
Start: 2022-05-19 | End: 2022-05-20 | Stop reason: HOSPADM

## 2022-05-19 RX ORDER — ACETAMINOPHEN 325 MG/1
650 TABLET ORAL ONCE
Status: COMPLETED | OUTPATIENT
Start: 2022-05-19 | End: 2022-05-19

## 2022-05-19 RX ORDER — ACETAMINOPHEN 325 MG/1
325 TABLET ORAL EVERY 4 HOURS PRN
Status: DISCONTINUED | OUTPATIENT
Start: 2022-05-19 | End: 2022-05-20 | Stop reason: HOSPADM

## 2022-05-19 RX ORDER — ASPIRIN 81 MG/1
81 TABLET ORAL DAILY
Status: DISCONTINUED | OUTPATIENT
Start: 2022-05-19 | End: 2022-05-20 | Stop reason: HOSPADM

## 2022-05-19 RX ORDER — TRAZODONE HYDROCHLORIDE 50 MG/1
50 TABLET, FILM COATED ORAL AT BEDTIME
Status: DISCONTINUED | OUTPATIENT
Start: 2022-05-19 | End: 2022-05-20 | Stop reason: HOSPADM

## 2022-05-19 RX ORDER — ATORVASTATIN CALCIUM 40 MG/1
80 TABLET, FILM COATED ORAL DAILY
Status: DISCONTINUED | OUTPATIENT
Start: 2022-05-19 | End: 2022-05-20 | Stop reason: HOSPADM

## 2022-05-19 RX ORDER — VIT C/E/ZN/COPPR/LUTEIN/ZEAXAN 60 MG-6 MG
1 CAPSULE ORAL 2 TIMES DAILY
Status: DISCONTINUED | OUTPATIENT
Start: 2022-05-19 | End: 2022-05-20 | Stop reason: HOSPADM

## 2022-05-19 RX ADMIN — Medication 1 CAPSULE: at 13:12

## 2022-05-19 RX ADMIN — TRAZODONE HYDROCHLORIDE 50 MG: 50 TABLET ORAL at 21:24

## 2022-05-19 RX ADMIN — PIPERACILLIN AND TAZOBACTAM 3.38 G: 3; .375 INJECTION, POWDER, LYOPHILIZED, FOR SOLUTION INTRAVENOUS at 10:27

## 2022-05-19 RX ADMIN — METOPROLOL SUCCINATE 12.5 MG: 25 TABLET, EXTENDED RELEASE ORAL at 20:06

## 2022-05-19 RX ADMIN — TAMSULOSIN HYDROCHLORIDE 0.4 MG: 0.4 CAPSULE ORAL at 13:13

## 2022-05-19 RX ADMIN — Medication 1000 MCG: at 13:12

## 2022-05-19 RX ADMIN — ATORVASTATIN CALCIUM 80 MG: 40 TABLET, FILM COATED ORAL at 13:13

## 2022-05-19 RX ADMIN — SODIUM CHLORIDE 250 ML: 9 INJECTION, SOLUTION INTRAVENOUS at 00:11

## 2022-05-19 RX ADMIN — PIPERACILLIN AND TAZOBACTAM 3.38 G: 3; .375 INJECTION, POWDER, LYOPHILIZED, FOR SOLUTION INTRAVENOUS at 00:24

## 2022-05-19 RX ADMIN — IOPAMIDOL 75 ML: 755 INJECTION, SOLUTION INTRAVENOUS at 02:47

## 2022-05-19 RX ADMIN — ACETAMINOPHEN 325 MG: 325 TABLET ORAL at 20:14

## 2022-05-19 RX ADMIN — ACETAMINOPHEN 650 MG: 325 TABLET ORAL at 00:11

## 2022-05-19 RX ADMIN — MIRTAZAPINE 7.5 MG: 7.5 TABLET, FILM COATED ORAL at 21:24

## 2022-05-19 RX ADMIN — Medication 1 TABLET: at 13:12

## 2022-05-19 RX ADMIN — ACETAMINOPHEN 325 MG: 325 TABLET ORAL at 14:38

## 2022-05-19 RX ADMIN — METOPROLOL SUCCINATE 12.5 MG: 25 TABLET, EXTENDED RELEASE ORAL at 13:12

## 2022-05-19 RX ADMIN — ACETAMINOPHEN 650 MG: 325 TABLET ORAL at 07:38

## 2022-05-19 RX ADMIN — Medication 81 MG: at 13:15

## 2022-05-19 RX ADMIN — IRBESARTAN 150 MG: 150 TABLET, FILM COATED ORAL at 13:12

## 2022-05-19 RX ADMIN — Medication 1 CAPSULE: at 20:06

## 2022-05-19 RX ADMIN — LEVOTHYROXINE SODIUM 125 MCG: 0.03 TABLET ORAL at 13:12

## 2022-05-19 RX ADMIN — SODIUM CHLORIDE 50 ML/HR: 9 INJECTION, SOLUTION INTRAVENOUS at 21:31

## 2022-05-19 NOTE — ED PROVIDER NOTES
EMERGENCY DEPARTMENT ENCOUNTER      NAME: Lionel Chávez  AGE: 89 year old male  YOB: 1926  MRN: [id number]  EVALUATION DATE & TIME: 5/18/2022 10:24 PM    PCP: Ziyad Ramos    ED PROVIDER: Samara Hernandez M.D.        Chief Complaint   Patient presents with     Fall     Altered Mental Status     loss of balance         FINAL IMPRESSION:    1. Fever in adult    2. Fall in home, initial encounter    3. Leukopenia, unspecified type    4. Dehydration            MEDICAL DECISION MAKING:    Lionel Chávez is a 89 year old male with history of diabetes mellitus type 2, hypertension, CAD, pacemaker, leukopenia, hypothyroidism, hyperlipidemia, and unsteady gait who presents to the ER with complaints of a fall, altered mental status, and loss of balance.  She found to have a temperature 100.2.  Family states that he had his COVID booster yesterday.  He denies any infectious symptoms.  He was found to have leukopenia and neutropenia here in the ER the leukopenia is not new for him though seems to be trending downward over the last couple of months.  He did have 1 blood pressure lower in the 90s, otherwise rest of blood pressures look good.  Antibiotics were initiated due to neutropenia though no source has yet been truly identified.  Urine culture has been requested.  Plan at this time is admission to the hospital to the hospitalist service for ongoing fluid hydration, and monitoring.  They have requested CT of the abdomen and this has been ordered.        ED COURSE:  10:50 PM  I met with the patient to gather history and perform my exam. ED course and treatment discussed.    1:18 AM  Updated the patient and daughter at bedside.  They understand the results and agree with the plan for admission to the hospital.  All of their questions have been answered.    1:42 AM  Spoke with Dr. Alvarez, hospitalist, who accepts the patient for admission.  He has requested a CT of the abdomen to look for  any other source for infection and this will be ordered.  Patient otherwise hemodynamically stable at this time.  I suspect a component of his unsteady gait and falls is some dehydration.  His creatinine has bumped up a little bit.  Patient also received his COVID booster shot yesterday and may be having a bit of a fever related to that as well.  No respiratory distress.  Otherwise hemodynamically stable at this time.  Given his falls the plan is admission to the hospital least overnight on antibiotics to look for any other source of infection and see how he is feeling in the morning.  Patient and daughter agree with this plan.  He denies feeling dizzy, syncope, near syncope, or any loss of consciousness.  Family denies any loss of consciousness.  Therefore I do think cardiac etiology is less likely and low likelihood this represents a cardiac dysrhythmia.  No signs of any acute traumatic injuries.      I do not think that this represents ACS, PE, ruptured AAA, aortic dissection, bowel obstruction, bowel ischemia, cholecystitis, pancreatitis, appendicitis, diverticulitis, kidney stone, pyelonephritis, incarcerated or strangulated hernia, testicular torsion, viscus perforation, perforated GI ulcer, or other such etiologies at this time.      COVID-19 PPE worn during patient evaluation:  Mask: n95 and homemade masks   Eye Protection: goggles   Gown: none   Hair cover: yes  Face shield: none   Patient wearing a mask: wearing down around his chin    At the conclusion of the encounter I discussed the results of all of the tests and the disposition. Their questions were answered. The patient (and any family present) acknowledged understanding and were agreeable with the care plan.        CONSULTANTS:  Hospitalist - Dr. Alvarez      MEDICATIONS GIVEN IN THE EMERGENCY:  Medications   piperacillin-tazobactam (ZOSYN) 3.375 g vial to attach to  mL bag (3.375 g Intravenous Given 5/19/22 0024)   sodium chloride 0.9%  infusion (has no administration in time range)   0.9% sodium chloride BOLUS (0 mLs Intravenous Stopped 5/19/22 0026)   acetaminophen (TYLENOL) tablet 650 mg (650 mg Oral Given 5/19/22 0011)           NEW PRESCRIPTIONS STARTED AT TODAY'S ER VISIT     Medication List      There are no discharge medications for this visit.             CONDITION:  Stable, improving        DISPOSITION:  Med tele obs as accepted by Dr. Alvarez, hospitalist         =================================================================  =================================================================    HPI    Patient information was obtained from: patient and family    Use of Intrepreter: N/A      Lionel Chávez is a 89 year old male with history of diabetes mellitus type 2, hypertension, CAD, pacemaker, leukopenia, hypothyroidism, hyperlipidemia, and unsteady gait who presents to the ER with complaints of a fall, altered mental status, and loss of balance.    Family reports patient has had 3 falls today.  They state he seems to just be losing his balance.  No loss of consciousness and I do not think that it is syncope related.  1 time he just slid off the toilet and another time he was trying to do laundry and lost his balance falling backwards and falling onto his buttocks.  The patient and family deny any known history of hitting his head.  He is not on any blood thinners.  I do feel that he has been off balance more.  He did have dental extraction yesterday of nearly all of his teeth and also had COVID booster vaccination yesterday.    Otherwise she denies any known fevers, cough, chest pain, shortness of breath, abdominal pain, vomiting, diarrhea, dysuria, hematuria.  He denies any dental pain or drainage.    Family states that he has been complaining of some back pain and left leg pain since the fall though he does have this pain in general but seems to be worse.  Better at rest, worse with ambulation.      REVIEW OF SYSTEMS  Review of  Systems   Constitutional: Negative for fever.   Respiratory: Negative for cough and shortness of breath.    Cardiovascular: Negative for chest pain.   Gastrointestinal: Negative for abdominal pain, diarrhea, nausea and vomiting.   Musculoskeletal: Positive for gait problem.   Allergic/Immunologic: Negative for immunocompromised state.   Neurological: Negative for weakness, numbness and headaches.   All other systems reviewed and are negative.          PAST MEDICAL HISTORY:  Past Medical History:   Diagnosis Date     CAD (coronary artery disease)      DM2 (diabetes mellitus, type 2) (H)          PAST SURGICAL HISTORY:  Past Surgical History:   Procedure Laterality Date     CATARACT EXTRACTION       EP ICD       NASAL SEPTOPLASTY W/ TURBINOPLASTY           CURRENT MEDICATIONS:    Prior to Admission medications    Medication Sig Start Date End Date Taking? Authorizing Provider   aspirin 81 MG EC tablet [ASPIRIN 81 MG EC TABLET] Take 81 mg by mouth daily. 1/11/16   Provider, Historical   atorvastatin (LIPITOR) 80 MG tablet [ATORVASTATIN (LIPITOR) 80 MG TABLET] TAKE 1 TABLET BY MOUTH EVERYDAY AT BEDTIME  Patient taking differently: Take 80 mg by mouth At Bedtime  7/16/20   Ziyad Ramos, DO   blood glucose test (ONETOUCH VERIO TEST STRIPS) strips [BLOOD GLUCOSE TEST (ONETOUCH VERIO TEST STRIPS) STRIPS] USE TO TEST BLOOD SUGAR 1-2 TIMES DAILY. 1/22/21   Andrei Valentin MD   cyanocobalamin (VITAMIN B-12) 1000 MCG SUBL sublingual tablet Place 1 tablet (1,000 mcg) under the tongue daily 3/11/22 6/9/22  Philomena Reyes MD   irbesartan (AVAPRO) 300 MG tablet Take 1 tablet (300 mg) by mouth daily 3/11/22 4/10/22  Philomena Reyes MD   lancets (ONETOUCH DELICA LANCETS) 30 gauge Misc [LANCETS (ONETOUCH DELICA LANCETS) 30 GAUGE MISC] Check blood glucose daily. 9/25/19   Ziyad Ramos, DO   lancets 33 gauge Misc [LANCETS 33 GAUGE MISC] 2 (two) times a day. 12/14/16   Provider, Historical  "  levothyroxine (SYNTHROID, LEVOTHROID) 125 MCG tablet [LEVOTHYROXINE (SYNTHROID, LEVOTHROID) 125 MCG TABLET] TAKE 1 TABLET BY MOUTH EVERY DAY  Patient taking differently: Take 125 mcg by mouth daily  5/11/20   Ziyad Ramos DO   metFORMIN (GLUCOPHAGE-XR) 500 MG 24 hr tablet Take 1 tablet (500 mg) by mouth daily (with dinner) 3/10/22 4/9/22  Philomena Reyes MD   metoprolol succinate (TOPROL-XL) 25 MG [METOPROLOL SUCCINATE (TOPROL-XL) 25 MG] TAKE 1/2 TABLET BY MOUTH 2 TIMES DAILY  Patient taking differently: Take 12.5 mg by mouth 2 times daily  10/2/20   Ziyad Ramos DO   mirtazapine (REMERON) 7.5 MG tablet Take 7.5 mg by mouth At Bedtime    Unknown, Entered By History   multivitamin  with lutein (OCUVITE WITH LUTEIN) CAPS per capsule Take 2 capsules by mouth daily    Unknown, Entered By History   multivitamin (CENTRUM SILVER) tablet Take 1 tablet by mouth daily    Unknown, Entered By History   omega-3 fatty acids-fish oil 340-1,000 mg cap [OMEGA-3 FATTY ACIDS-FISH -1,000 MG CAP] Take 1 capsule by mouth daily.  1/31/13   Provider, Historical   tamsulosin (FLOMAX) 0.4 MG capsule Take 0.4 mg by mouth daily    Unknown, Entered By History   traZODone (DESYREL) 50 MG tablet Take 50 mg by mouth At Bedtime    Unknown, Entered By History         ALLERGIES:  Allergies   Allergen Reactions     Amiodarone Other (See Comments)     Per [facility] records \"eye changes & thyroid gland side effects\"         FAMILY HISTORY:  Family History   Problem Relation Age of Onset     Hyperlipidemia Sister      Heart Disease Brother      Michael-Danlos syndrome Daughter          SOCIAL HISTORY:  Social History     Socioeconomic History     Marital status:    Tobacco Use     Smoking status: Never Smoker     Smokeless tobacco: Never Used   Substance and Sexual Activity     Alcohol use: No     Drug use: No         VITALS:  Patient Vitals for the past 24 hrs:   BP Temp Temp src Pulse Resp SpO2 Height " "Weight   05/19/22 0140 (!) 143/66 -- -- 70 23 96 % -- --   05/19/22 0133 -- 98.4  F (36.9  C) Oral -- -- -- -- --   05/19/22 0045 (!) 151/68 -- -- 77 27 95 % -- --   05/19/22 0015 135/64 -- -- 68 17 96 % -- --   05/18/22 2300 104/52 -- -- 73 30 92 % -- --   05/18/22 2250 103/57 -- -- 74 27 94 % -- --   05/18/22 2245 92/51 -- -- -- -- -- -- --   05/18/22 2220 -- 100.2  F (37.9  C) Oral -- -- -- -- --   05/18/22 2216 113/61 -- -- 77 24 92 % 1.778 m (5' 10\") 84.8 kg (187 lb)       Wt Readings from Last 3 Encounters:   05/18/22 84.8 kg (187 lb)   03/07/22 86.2 kg (190 lb)   07/27/20 85.2 kg (187 lb 14.4 oz)         PHYSICAL EXAM    Constitutional:  Well developed, Well nourished, NAD, GCS 15  HENT:  Normocephalic, Atraumatic, Bilateral external ears normal, Nose normal. Neck- Supple, No stridor.   Eyes:  PERRL, EOMI, Conjunctiva normal, No discharge.  Respiratory:  Normal breath sounds, No respiratory distress, No wheezing, Speaks full sentences easily. No cough.   Cardiovascular:  Normal heart rate, Regular rhythm, No rubs, No gallops. Chest wall nontender.   GI:  No excessive obesity.  Bowel sounds normal, Soft, No tenderness, No masses, No flank tenderness. No rebound or guarding.   : deferred  Musculoskeletal: 2+ DP pulses.  No cyanosis, No clubbing. Good range of motion in all major joints. No tenderness to palpation or major deformities noted.  Patient is able to lift both the right leg and left leg off the bed without difficulty and denies any pain.  No tenderness of the CTLS spine.   Integument:  Warm, Dry, No erythema, No rash.  No petechiae.   Neurologic:  Alert & interactive, Normal motor function, Normal sensory function, No focal deficits noted.  Psychiatric:  Affect normal, Cooperative         LAB:  All pertinent labs reviewed and interpreted.  Recent Results (from the past 24 hour(s))   WBC and Differential    Collection Time: 05/18/22 11:20 AM   Result Value Ref Range    WBC Count 1.8 (L) 4.0 - 11.0 " 10e3/uL    % Neutrophils 61 %    % Lymphocytes 18 %    % Monocytes 20 %    % Eosinophils 0 %    % Basophils 0 %    % Immature Granulocytes 1 %    NRBCs per 100 WBC 0 <1 /100    Absolute Neutrophils 1.1 (L) 1.6 - 8.3 10e3/uL    Absolute Lymphocytes 0.3 (L) 0.8 - 5.3 10e3/uL    Absolute Monocytes 0.4 0.0 - 1.3 10e3/uL    Absolute Eosinophils 0.0 0.0 - 0.7 10e3/uL    Absolute Basophils 0.0 0.0 - 0.2 10e3/uL    Absolute Immature Granulocytes 0.0 <=0.4 10e3/uL    Absolute NRBCs 0.0 10e3/uL   CBC (+ platelets, no diff)    Collection Time: 05/18/22 11:20 PM   Result Value Ref Range    WBC Count 1.8 (L) 4.0 - 11.0 10e3/uL    RBC Count 3.24 (L) 4.40 - 5.90 10e6/uL    Hemoglobin 9.8 (L) 13.3 - 17.7 g/dL    Hematocrit 29.5 (L) 40.0 - 53.0 %    MCV 91 78 - 100 fL    MCH 30.2 26.5 - 33.0 pg    MCHC 33.2 31.5 - 36.5 g/dL    RDW 14.2 10.0 - 15.0 %    Platelet Count 139 (L) 150 - 450 10e3/uL   Basic metabolic panel    Collection Time: 05/18/22 11:20 PM   Result Value Ref Range    Sodium 133 (L) 136 - 145 mmol/L    Potassium 4.5 3.5 - 5.0 mmol/L    Chloride 102 98 - 107 mmol/L    Carbon Dioxide (CO2) 20 (L) 22 - 31 mmol/L    Anion Gap 11 5 - 18 mmol/L    Urea Nitrogen 35 (H) 8 - 28 mg/dL    Creatinine 1.40 (H) 0.70 - 1.30 mg/dL    Calcium 9.1 8.5 - 10.5 mg/dL    Glucose 155 (H) 70 - 125 mg/dL    GFR Estimate 46 (L) >60 mL/min/1.73m2   Symptomatic; Unknown Influenza A/B & SARS-CoV2 (COVID-19) Virus PCR Multiplex Nasopharyngeal    Collection Time: 05/18/22 11:20 PM    Specimen: Nasopharyngeal; Swab   Result Value Ref Range    Influenza A PCR Negative Negative    Influenza B PCR Negative Negative    SARS CoV2 PCR Negative Negative   Extra Blue Top Tube    Collection Time: 05/18/22 11:20 PM   Result Value Ref Range    Hold Specimen JIC    Extra Red Top Tube    Collection Time: 05/18/22 11:20 PM   Result Value Ref Range    Hold Specimen JIC    Extra Green Top (Lithium Heparin) Tube    Collection Time: 05/18/22 11:20 PM   Result Value Ref  Range    Hold Specimen JIC    Extra Purple Top Tube    Collection Time: 05/18/22 11:20 PM   Result Value Ref Range    Hold Specimen JIC    Lactic acid whole blood    Collection Time: 05/18/22 11:20 PM   Result Value Ref Range    Lactic Acid 1.2 0.7 - 2.0 mmol/L   Hepatic function panel    Collection Time: 05/18/22 11:20 PM   Result Value Ref Range    Bilirubin Total 1.1 (H) 0.0 - 1.0 mg/dL    Bilirubin Direct 0.4 <=0.5 mg/dL    Protein Total 6.6 6.0 - 8.0 g/dL    Albumin 3.4 (L) 3.5 - 5.0 g/dL    Alkaline Phosphatase 103 45 - 120 U/L    AST 31 0 - 40 U/L    ALT 27 0 - 45 U/L   Lipase    Collection Time: 05/18/22 11:20 PM   Result Value Ref Range    Lipase 11 0 - 52 U/L   UA with Microscopic reflex to Culture    Collection Time: 05/19/22 12:47 AM    Specimen: Urine, Catheter   Result Value Ref Range    Color Urine Yellow Colorless, Straw, Light Yellow, Yellow    Appearance Urine Clear Clear    Glucose Urine Negative Negative mg/dL    Bilirubin Urine Negative Negative    Ketones Urine Negative Negative mg/dL    Specific Gravity Urine 1.025 1.001 - 1.030    Blood Urine Negative Negative    pH Urine 5.0 5.0 - 7.0    Protein Albumin Urine 20  (A) Negative mg/dL    Urobilinogen Urine <2.0 <2.0 mg/dL    Nitrite Urine Negative Negative    Leukocyte Esterase Urine Negative Negative    Bacteria Urine Few (A) None Seen /HPF    Mucus Urine Present (A) None Seen /LPF    RBC Urine 6 (H) <=2 /HPF    WBC Urine 2 <=5 /HPF    Hyaline Casts Urine 3 (H) <=2 /LPF       No results found for: [facility]        RADIOLOGY:  Reviewed all pertinent imaging. Please see official radiology report.    XR Chest 1 View   Final Result   IMPRESSION: Clear lungs. Normal heart size and pulmonary vascularity. Mild calcified plaque of the aortic arch. No pneumothorax. Left subclavian pacemaker with leads at the right atrium and right ventricle.      XR Pelvis w Hip Left G/E 2 Views   Final Result   IMPRESSION: No visible fracture or dislocation. Vascular  calcification.      Lumbar spine CT w/o contrast   Final Result   IMPRESSION:   1.  No fracture or posttraumatic subluxation.         CT Thoracic Spine w/o Contrast   Final Result   IMPRESSION:   1.  No fracture or posttraumatic subluxation.   2.  No high-grade spinal canal or neural foraminal stenosis.         CT Facial Bones without Contrast   Final Result   IMPRESSION:    1.  Mild bilateral chronic maxillary sinus disease.   2.  Edentulous.   3.  No acute findings.         Cervical spine CT w/o contrast   Final Result   IMPRESSION:   1.  No fracture or posttraumatic subluxation.      Head CT w/o contrast   Final Result   IMPRESSION:   1.  No acute intracranial process.      CT Chest/Abdomen/Pelvis w Contrast    (Results Pending)         EKG:    Indication: Falls    Performed at: 23:19p  Impression: paced at 69 bpm.  Flipped T waves noted in lead aVR, aVL.  LA interval 162 ms,  ms, QTC 4 and 84 ms.  Nonspecific ST changes compared to March 10, 2022.      I have independently reviewed and interpreted the EKG(s) documented above.        PROCEDURES:  none      I, Liliana Cid, am serving as a scribe to document services personally performed by Dr. Samara Hernandez based on my observation and the provider's statements to me. I, Dr. Samara Hernandez MD attest that Liliana Cid is acting in a scribe capacity, has observed my performance of the services and has documented them in accordance with my direction.        Samara Hernandez M.D. [facility]  Emergency Medicine and Medical Toxicology  [facility] EMERGENCY DEPARTMENT  [facility] [facility] [address]  [phone]  Dept: [phone]           Samara Hernandez MD  05/19/22 0220

## 2022-05-19 NOTE — PHARMACY-ADMISSION MEDICATION HISTORY
Although I was not present for the interview, nor did I personally see the patient, to my knowledge the intern has compiled the PTA medication list to the best of their ability given the information available at the time.    ODETTE CAMEJO, [facility]    Pharmacy Note - Admission Medication History    Pertinent Provider Information:     The patient lives ar [facility], but manages his medications himself.     Facility phone number:      ______________________________________________________________________    Prior To Admission (PTA) med list completed and updated in EMR.       PTA Med List   Medication Sig Note Last Dose     acetaminophen (TYLENOL) 500 MG tablet Take 1,000 mg by mouth every 6 hours as needed for mild pain  5/19/2022 at Unknown time     aspirin 81 MG EC tablet [ASPIRIN 81 MG EC TABLET] Take 81 mg by mouth daily.  Past Week at Unknown time     atorvastatin (LIPITOR) 80 MG tablet [ATORVASTATIN (LIPITOR) 80 MG TABLET] TAKE 1 TABLET BY MOUTH EVERYDAY AT BEDTIME  Past Week at Unknown time     cyanocobalamin (VITAMIN B-12) 1000 MCG SUBL sublingual tablet Place 1 tablet (1,000 mcg) under the tongue daily 5/19/2022: Takes by mouth, instead of sublingually  Past Week at Unknown time     hydrochlorothiazide (HYDRODIURIL) 25 MG tablet Take 12.5 mg by mouth daily  Past Week at Unknown time     irbesartan (AVAPRO) 300 MG tablet Take 1 tablet (300 mg) by mouth daily (Patient taking differently: Take 150 mg by mouth daily)  Past Week at Unknown time     levothyroxine (SYNTHROID, LEVOTHROID) 125 MCG tablet [LEVOTHYROXINE (SYNTHROID, LEVOTHROID) 125 MCG TABLET] TAKE 1 TABLET BY MOUTH EVERY DAY  Past Week at Unknown time     metFORMIN (GLUCOPHAGE XR) 500 MG 24 hr tablet Take 500 mg by mouth daily (with dinner)  Past Week at Unknown time     metoprolol succinate (TOPROL-XL) 25 MG [METOPROLOL SUCCINATE (TOPROL-XL) 25 MG] TAKE 1/2 TABLET BY MOUTH 2 TIMES DAILY  Past Week at Unknown time     mirtazapine (REMERON)  7.5 MG tablet Take 7.5 mg by mouth At Bedtime  Past Week at Unknown time     multivitamin  with lutein (OCUVITE WITH LUTEIN) CAPS per capsule Take 1 capsule by mouth 2 times daily  Past Week at Unknown time     multivitamin (CENTRUM SILVER) tablet Take 1 tablet by mouth daily  Past Week at Unknown time     omega-3 fatty acids-fish oil 340-1,000 mg cap [OMEGA-3 FATTY ACIDS-FISH -1,000 MG CAP] Take 1 capsule by mouth daily.   Past Week at Unknown time     traZODone (DESYREL) 50 MG tablet Take 50 mg by mouth At Bedtime  Past Week at Unknown time       Information source(s): [facility]  Method of interview communication: in-person    Summary of Changes to PTA Med List  New:  Tylenol   Hydrochlorothiazide     Discontinued:   Patient is not taking Tamsulosin.     Changed:   Vitamin V12-patient takes it PO, rather than SL.   Multivitamin with lutein-Takes 1 tablet twice daily, rather than 2 tablets by mouth once daily.   Irbesartan. Decreased to 150 mg by mouth daily     Patient was asked about OTC/herbal products specifically.  PTA med list reflects this.    In the past week, patient estimated taking medication this percent of the time:  50-90% due to illness and other.    Allergies were reviewed, assessed, and updated with the patient.      Patient did not bring any medications to the hospital and can't retrieve from home. No multi-dose medications are available for use during hospital stay.     The information provided in this note is only as accurate as the sources available at the time of the update(s).    Thank you for the opportunity to participate in the care of this patient.    Aysha Leblanc  5/19/2022 10:46 AM

## 2022-05-19 NOTE — ED NOTES
Pt rang call light for urinal. Pt unable to hold it for urinal, incontinent in bed. Pt cleansed, new gown, and pull up on, and bed changed. Pt now lying back down.

## 2022-05-19 NOTE — ED NOTES
"[facility] ED Handoff Report    ED Chief Complaint: Fall    ED Diagnosis:  (R50.9) Fever in adult  Comment:   Plan:     (W19.XXXA,  Y92.009) Fall in home, initial encounter  Comment:   Plan:     (D72.819) Leukopenia, unspecified type  Comment:   Plan:     (E86.0) Dehydration  Comment:   Plan:        PMH:    Past Medical History:   Diagnosis Date     CAD (coronary artery disease)      DM2 (diabetes mellitus, type 2) (H)         Code Status:  Prior     Falls Risk: Yes Band: Applied    Current Living Situation/Residence: lives in an assisted living facility since 4/7    Elimination Status: Continent: wears briefs, primofit in place    Activity Level: SBA, uses a cane    Patients Preferred Language:  English     Needed: No    Vital Signs:  BP (!) 189/80   Pulse 78   Temp 98.4  F (36.9  C) (Oral)   Resp 23   Ht 1.778 m (5' 10\")   Wt 84.8 kg (187 lb)   SpO2 94%   BMI 26.83 kg/m       Cardiac Rhythm:     Pain Score: 7/10 left leg    Is the Patient Confused:  Yes    Last Food or Drink: 05/19/22 at 1426    Focused Assessment:  A/O x4, 89 year old male with history of diabetes mellitus type 2, hypertension, CAD, pacemaker, leukopenia, hypothyroidism, hyperlipidemia, and unsteady gait who presents to the ER with complaints of a fall, altered mental status, and loss of balance.  She found to have a temperature 100.2.  Family states that he had his COVID booster yesterday.  He denies any infectious symptoms.  He was found to have leukopenia and neutropenia here in the ER the leukopenia is not new for him though seems to be trending downward over the last couple of months.  He did have 1 blood pressure lower in the 90s, otherwise rest of blood pressures look good.  Antibiotics were initiated due to neutropenia though no source has yet been truly identified.  Urine culture has been requested.  Plan at this time is admission to the hospital to the hospitalist service for ongoing fluid hydration, and " monitoring.    Tests Performed: Done: Labs and Imaging    Treatments Provided:  Antibiotics, fluids, oral meds    Family Dynamics/Concerns: No    Family Updated On Visitor Policy: Yes    Plan of Care Communicated to Family: Yes    Who Was Updated about Plan of Care: wife, patient updated    Belongings Checklist Done and Signed by Patient: Yes    Medications sent with patient:     Covid: asymptomatic , negative    Additional Information:     RN: Theresa Sagastume RN 5/19/2022 2:24 PM

## 2022-05-19 NOTE — CONSULTS
[facility]  Neurology Consultation    Patient Name:  Lionel Chávez  MRN:  [id number]    :  1926  Date of Service:  May 19, 2022  Primary care provider:  Negro Emmanuel      Neurology consultation service was asked to see Lionel Chávez by Dr. Collins to evaluate encephalopathy and falls    Chief Complaint:  falls    History of Present Illness:   Lionel Chávez is a 89 year old male with history of MI, CAD, DM2 who presents with falls and concern for encephalopathy.  Per patient he states he started falling about a year ago.  He estimates he has had about 6 falls in the past year.  He has had 3 however in the past month.  It appears patient was confused and unable to provide much history in the ED, but now is significantly better.  He is able to describe his last 2 falls in detail.  His first 1 he was sitting on the toilet and trying to get up and his feet slid out from under him.  The 1 prior to that he was carrying a load of laundry, and attempting to open a door when he subsequently tripped and fell onto his bottom.  He did get a booster vaccination a few days ago, and has had some low-grade fevers.    He denies any leg weakness, and a urinary or stool symptoms.  He denies any changes in numbness or saddle anesthesia.  He denies any history of cognitive changes.  He denies headache, vision change, shortness of breath, recent travel.    Maximum temperature was noted to be 100.2.  Because he was leukopenic he was subsequently started on antibiotics but does not have any clear infection with testing done thus far.  He did have a lower blood pressure in the 90s, and is also noted to have an ANTWON with creatinine of 1.4.      ROS  A comprehensive ROS was performed and pertinent findings were included in HPI.     PMH  Past Medical History:   Diagnosis Date     CAD (coronary artery disease)      DM2 (diabetes mellitus, type 2) (H)      Past Surgical History:  "  Procedure Laterality Date     CATARACT EXTRACTION       EP ICD       NASAL SEPTOPLASTY W/ TURBINOPLASTY         Medications   I have personally reviewed the patient's medication list.     Allergies  I have personally reviewed the patient's allergy list.     Social History  , states he takes care of his wife with pulmonary fibrosis.  Denies tobacco or drug use.    Family History    Family history of heart disease in brother but no known family history of dementia.      Physical Examination   Vitals: BP (!) 189/80   Pulse 78   Temp 98.4  F (36.9  C) (Oral)   Resp 23   Ht 1.778 m (5' 10\")   Wt 84.8 kg (187 lb)   SpO2 94%   BMI 26.83 kg/m    General: Lying in bed, NAD  Head: NC/AT  Eyes: no icterus, op pink and moist  Cardiac: Regular rate and rhythm when I listen, but does have intermittent A. fib on the monitor.  Respiratory: non-labored on RA,  GI: S/NT/ND  Skin: No rash or lesion on exposed skin with exception of some bruising on the arms and distal legs.  Psych: Mood pleasant, affect congruent  Neuro:  Mental status: Awake, alert, attentive, oriented to self, time, place, and circumstance. Language is fluent and coherent with intact comprehension of complex commands, naming and repetition.  3-3 and delayed recall.  Able to name 11 animals in 20 seconds.  Able to name past 2 presidents but not present prior.  Able to do days a week backwards easily.  Able to follow three-step cross commands without difficulty.  Cranial nerves: PERRL, conjugate gaze, EOMI, facial sensation intact, face symmetric, shoulder shrug strong, tongue/uvula midline, no dysarthria.   Motor: Normal bulk and tone. No abnormal movements. 5/5 strength bilaterally in deltoids, biceps, triceps, hand , hip flexors, hip extensors, knee flexion, knee extension, plantarflexion, dorsiflexion.   Reflexes: Brisk and symmetric in upper extremities.  Nonpathologic negative Kaylan's.  Lower extremities 3+ patella with crossed adductors.  " Achilles trace.  Toes are upgoing at baseline, but do appear positive on Babinski testing.  No clonus  Sensory: Proprioception intact at distal hallux.  Does appear to have mild vibratory loss approximately 5 seconds at distal hallux.  Greater than 10 seconds at medial malleolus and more proximally.  Light touch intact without sensory extinction  Coordination: FNF and HS without ataxia or dysmetria.   Gait: Deferred due to multiple lines catheter placements recently.    Investigations   I have personally reviewed pertinent labs, tests, and radiological imaging. Discussion of notable findings is included under Impression.     Was patient transferred from outside hospital?   No    Impression  #Falls  #Brisk reflexes  #Encephalopathy: resolved  #Afib    Mr. Chávez is a 89-year-old male with complex cardiac history who neurology is consulted for falls and encephalopathy.  On my exam patient does not have any encephalopathy.  He actually performs quite well on bedside mental status testing.  I would not recommend any further work-up as inpatient.  If there is concerns from patient and family when at baseline could consider neuropsych testing at outpatient.  Suspect this may have been mild worsening in the setting of dehydration related to his ANTWON.    Regarding falls, his description of the events is consistent with mechanical falls.  He has had approximately 6 falls in the last year.  He states they are all from low height.  His neurologic exam is relatively reassuring with intact strength and sensation, although he does have brisk reflexes and upgoing toes.  I discussed with him utility of MRI of cervical and thoracic spine.  At this point he is absolutely not interested in any surgical option which I think is reasonable given his age and comorbidities.  Given his intact strength would prefer a trial of physical therapy and if continues to have frequent falls could consider MRI of the cervical and thoracic spine, but  main purpose of this would be to undergo surgical evaluation.  I did not get him up and walk him as he recently had a catheter placed and has multipleline's that would not allow for a far gait assessment in the ED.   would recommend assessment by physical therapy and probable outpatient physical therapy.  If there is concern after physical therapy assessments for a neurologic source of his gait impairment please asked neurology to reassess.  Suspect falls may be multifactorial and also  related to dehydration, and probable atrial fibrillation should be addressed by primary team.    Recommendations  -Physical therapy consult  -No inpatient work-up for encephalopathy.  If concerns about cognition when out of hospital would consider outpatient neuropsych, and outpatient neurology referral.   - Would address Afib noted on cardiac monitor while I was in room.    -Neurology will sign off    Thank you for involving Neurology in the care of Lionel BABS Chávez.   Antonio Esquivel, DO

## 2022-05-19 NOTE — UTILIZATION REVIEW
Concurrent stay review; Secondary Review Determination     Under the authority of the Utilization Management Committee, the utilization review process indicated a secondary review on Lionel Chávez.  The review outcome is based on review of the medical records, discussions with staff, and applying clinical experience noted on the date of the review.        (x) Observation Status Appropriate - Concurrent stay review    RATIONALE FOR DETERMINATION   89 yr old male with hx gait disturbance, mild cognitive impairment and frequent falls with chronic back pain presented to ED on 5/18 with fever and increase in baseline confusion.  Pancytopenia noted.  Temp 100.2 and no return.  Normal lactic acid.  No obvious source of fever except perhaps related to COVID19 vaccination received day prior to presentation.  Discussed with Dr. Collins and much improved after IVF from ED.  Did have small Cr increase.  Dr. Collins did continue IV abx for now pending ID consultation but not clear indications for need for abx.  Pancytopenia has been progressive over last 3 months.     If need for ongoing IV abx recommended by ID or if renal function requires further IVF after tomorrow or other issues arise, consider transition to inpatient.    Patient is clinically improving and there is no clear indication to change patient's status to inpatient. The severity of illness, intensity of service provided, expected LOS and risk for adverse outcome make the care appropriate for observation.    The information on this document is developed by the utilization review team in order for the business office to ensure compliance.  This only denotes the appropriateness of proper admission status and does not reflect the quality of care rendered.         The definitions of Inpatient Status and Observation Status used in making the determination above are those provided in the CMS Coverage Manual, Chapter 1 and Chapter 6, section 70.4.      Sincerely,    Nalini Herrera MD  Utilization Review  Physician Advisor  [facility]

## 2022-05-19 NOTE — CONSULTS
Consultation - INFECTIOUS DISEASE CONSULTATION  Lionel Chávez ,  1926, MRN [id number]      Fall [W19.XXXA]    PCP: Negro Emmanuel, [phone]   Code status:  Prior               Assessment:  1. Fall: seen by neurology without major concerns. Plan PT evaluation.  2. Leukopenia: ongoing for at least a few months. ANC >500 and afebrile so does not need antibiotic coverage for this based on WBC count. Now with pancytopenia. CT/exam without clear infection.   3. ANTWON  4. Pacemaker, afib  5. Recent COVID vaccination: could explain temp of 100.2    Active Problems:    Fall        Recommendations:   - stop pip-tazo and observe  - peripheral smear  - if BC positive can restart pip-tazo    Julia Alex MD  [facility]  [phone]       HPI:    Lionel Chávez is a 89 year old male. History is provided by patient and chart.  Presented to the ER on  with unsteady gait and falls. He reports 3 falls over the past month. One time, slid off the toilet. Another time fell while trying to open the door while holding laundry basket. He denies and fevers or chills. Intermittent cough. No shortness of breath, sore throat, chills, night sweats, abdominal pain, dysuria, increased urinary frequency from baseline, rashes. Has soft stools at baseline with no recent changes. Received 4th COVID shot left arm within the past couple of days, states his arm is sore. He had all his teeth pulled within the past week and is having some difficulty eating due to this.   Temp was 100.2 WBC was low so started on pip-tazo IV. CT CAP without infections, did have subacute to remote right rib fractures.   He states he feels fine. Wants to get home so he can take care of his wife who is on chronic O2.       Chief complaint: Active Problems:    Fall      Medical History  Active Ambulatory Problems     Diagnosis Date Noted     Type 2 diabetes mellitus (H) 2017     Essential hypertension 2017      "CAD (coronary artery disease) 08/29/2017     Hypothyroidism 08/29/2017     History of heart artery stent 08/29/2017     Pacemaker 08/29/2017     Lumbar radicular pain 08/29/2017     Hyperlipidemia 08/29/2017     Overweight 11/22/2017     Unsteady gait 03/07/2022     Generalized muscle weakness 03/07/2022     Pre-syncope 03/07/2022     Adult failure to thrive 03/07/2022     Asymptomatic hypertensive urgency 03/07/2022     Mood disorder (H) 03/07/2022     Hyponatremia 03/07/2022     BPH (benign prostatic hyperplasia) 03/07/2022     Leukopenia 03/07/2022     Normocytic anemia 03/07/2022     Resolved Ambulatory Problems     Diagnosis Date Noted     No Resolved Ambulatory Problems     Past Medical History:   Diagnosis Date     DM2 (diabetes mellitus, type 2) (H)          Surgical History  He  has a past surgical history that includes Nasal Septoplasty W/ Turbinoplasty; Implantable Cardioverter-Defibrillator (ICD); and Cataract Extraction.       Social History  Reviewed, and he  reports that he has never smoked. He has never used smokeless tobacco. He reports that he does not drink alcohol and does not use drugs.        Family History  Reviewed and noncontributory to present problem, and family history includes Michael-Danlos syndrome in his daughter; Heart Disease in his brother; Hyperlipidemia in his sister.   No FH frequent infections.    Psychosocial Needs  Social History     Social History Narrative     Not on file     Additional psychosocial needs reviewed per nursing assessment.       Allergies   Allergen Reactions     Amiodarone Other (See Comments)     Per [facility] records \"eye changes & thyroid gland side effects\"      (Not in a hospital admission)       Review of Systems:  A 12 point comprehensive review of systems was negative except as noted. Physical Exam:  Temp:  [98.4  F (36.9  C)-100.2  F (37.9  C)] 98.4  F (36.9  C)  Pulse:  [64-87] 78  Resp:  [13-30] 23  BP: ()/(51-80) 189/80  SpO2:  [90 %-96 " %] 94 %    GEN: alert and oriented x3, NAD  HEAD: atraumatic  ENT: moist membranes, no thrush, anicteric sclera, no dentition.   NECK: supple, no nuchal rigidity  CARDIOVASCULAR: regular rate and rhythm, no murmurs, rubs, or gallops  PULMONARY: lungs clear to ausculation bilaterally  ABDOMEN: soft, nontender, nondistended. Normal bowel sounds  SKIN: no rashes or lesions. No stigma of endocarditis  LYMPHADENOPATHY: no cervical, supraclavicular, axillary, or inguinal lymphadenopathy  PSYCH: grossly intact  MUSCULOSKELETAL: no synovitis               Pertinent Labs  personally reviewed.   CBC RESULTS:   Recent Labs   Lab Test 05/18/22  2320   WBC 1.8*   RBC 3.24*   HGB 9.8*   HCT 29.5*   MCV 91   MCH 30.2   MCHC 33.2   RDW 14.2   *        Last Comprehensive Metabolic Panel:  Sodium   Date Value Ref Range Status   05/18/2022 133 (L) 136 - 145 mmol/L Final     Potassium   Date Value Ref Range Status   05/18/2022 4.5 3.5 - 5.0 mmol/L Final     Chloride   Date Value Ref Range Status   05/18/2022 102 98 - 107 mmol/L Final     Carbon Dioxide (CO2)   Date Value Ref Range Status   05/18/2022 20 (L) 22 - 31 mmol/L Final     Anion Gap   Date Value Ref Range Status   05/18/2022 11 5 - 18 mmol/L Final     Glucose   Date Value Ref Range Status   05/18/2022 155 (H) 70 - 125 mg/dL Final     Urea Nitrogen   Date Value Ref Range Status   05/18/2022 35 (H) 8 - 28 mg/dL Final     Creatinine   Date Value Ref Range Status   05/18/2022 1.40 (H) 0.70 - 1.30 mg/dL Final     GFR Estimate   Date Value Ref Range Status   05/18/2022 46 (L) >60 mL/min/1.73m2 Final     Comment:     Effective December 21, 2021 eGFRcr in adults is calculated using the 2021 CKD-EPI creatinine equation which includes age and gender (Pat hernandez al., NEJ, DOI: 10.1056/EZTQrl8143828)   07/27/2020 >60 >60 mL/min/1.73m2 Final     Calcium   Date Value Ref Range Status   05/18/2022 9.1 8.5 - 10.5 mg/dL Final       The following microbiology studies were personally  reviewed:  No results found for: CULT    Urine Studies    Recent Labs   Lab Test 05/19/22  0047 03/07/22  1322   LEUKEST Negative Negative   WBCU 2 <1       Vancomycin Levels  No lab results found.    Invalid input(s): VANCO    MICROBIOLOGY DATA:  5/18 BC pending  5/19  pending    Pertinent Radiology  personally reviewed.     Recent Results (from the past 24 hour(s))   Head CT w/o contrast    Narrative    EXAM: CT HEAD W/O CONTRAST  LOCATION: [facility]  DATE/TIME: 5/18/2022 11:41 PM    INDICATION: 89 year old male with history of?diabetes mellitus type 2, hypertension, CAD, pacemaker, dizziness and unsteady gait?who presents to the ER with complaints of a fall, altered mental status, and loss of balance.  Family reports patient has had 3 falls today.  COMPARISON: 03/07/2022  TECHNIQUE: Routine CT Head without IV contrast. Multiplanar reformats. Dose reduction techniques were used.    FINDINGS:  INTRACRANIAL CONTENTS: No intracranial hemorrhage, extraaxial collection, or mass effect.  No CT evidence of acute infarct. Mild presumed chronic small vessel ischemic changes. Moderate generalized volume loss. No hydrocephalus.     VISUALIZED ORBITS/SINUSES/MASTOIDS: No intraorbital abnormality. No paranasal sinus mucosal disease. No middle ear or mastoid effusion.    BONES/SOFT TISSUES: No acute abnormality.      Impression    IMPRESSION:  1.  No acute intracranial process.   Cervical spine CT w/o contrast    Narrative    EXAM: CT CERVICAL SPINE W/O CONTRAST  LOCATION: [facility]  DATE/TIME: 5/18/2022 11:41 PM    INDICATION: Altered mental status, recurrent falls x 3 today, and loss of balance.  COMPARISON: None.  TECHNIQUE: Routine CT Cervical Spine without IV contrast. Multiplanar reformats. Dose reduction techniques were used.    FINDINGS:  VERTEBRA: Normal vertebral body heights and alignment. No fracture or posttraumatic subluxation.     CANAL/FORAMINA: Moderate to  extensive multilevel degenerative changes.    PARASPINAL: No extraspinal abnormality.      Impression    IMPRESSION:  1.  No fracture or posttraumatic subluxation.   CT Facial Bones without Contrast    Narrative    EXAM: CT FACIAL BONES WITHOUT CONTRAST  LOCATION: [facility]  DATE/TIME: 5/18/2022 11:41 PM    INDICATION: fever, general weakness, recent dental extraction of nearly all teeth  COMPARISON: None.  TECHNIQUE: Routine CT Maxillofacial without IV contrast. Multiplanar reformats. Dose reduction techniques were used.     FINDINGS:  OSSEOUS STRUCTURES/SOFT TISSUES: No localized soft tissue swelling/inflammation. No facial bone fracture or malalignment. Patient is edentulous.    ORBITAL CONTENTS: Prior right cataract surgery. Visualized portions of the orbits are otherwise unremarkable.    SINUSES: Mild bilateral maxillary sinus disease.    VISUALIZED INTRACRANIAL CONTENTS: No acute abnormality.       Impression    IMPRESSION:   1.  Mild bilateral chronic maxillary sinus disease.  2.  Edentulous.  3.  No acute findings.     CT Thoracic Spine w/o Contrast    Narrative    EXAM: CT THORACIC SPINE W/O CONTRAST  LOCATION: [facility]  DATE/TIME: 5/18/2022 11:42 PM    INDICATION: Recurrent falls, back pain  COMPARISON: None.  TECHNIQUE: Routine CT Thoracic Spine without IV contrast. Multiplanar reformats. Dose reduction techniques were used.     FINDINGS:  VERTEBRA: Normal vertebral body heights and alignment. No fracture or posttraumatic subluxation.     CANAL/FORAMINA: No canal or neural foraminal stenosis. Mild multilevel degenerative disc and facet disease.    PARASPINAL: No extraspinal abnormality.      Impression    IMPRESSION:  1.  No fracture or posttraumatic subluxation.  2.  No high-grade spinal canal or neural foraminal stenosis.     Lumbar spine CT w/o contrast    Narrative    EXAM: CT LUMBAR SPINE W/O CONTRAST  LOCATION: [facility]  DATE/TIME: 5/18/2022 11:42 PM    INDICATION: Recurrent falls, complaining of back pain  COMPARISON: None.  TECHNIQUE: Routine CT Lumbar Spine without IV contrast. Multiplanar reformats. Dose reduction techniques were used.     FINDINGS:  VERTEBRA: Normal vertebral body heights. There is 3 mm retrolisthesis of L1 over L2. Mild dextrocurvature of the lumbar spine. No fracture or posttraumatic subluxation.     CANAL/FORAMINA: Moderate multilevel degenerative disc disease. Moderate to severe facet arthropathy.    PARASPINAL: No extraspinal abnormality.      Impression    IMPRESSION:  1.  No fracture or posttraumatic subluxation.     XR Pelvis w Hip Left G/E 2 Views    Narrative    EXAM: XR PELVIS AND HIP LEFT 2 VIEWS  LOCATION: [facility]  DATE/TIME: 5/18/2022 11:52 PM    INDICATION: fall with pain  COMPARISON: None.      Impression    IMPRESSION: No visible fracture or dislocation. Vascular calcification.   XR Chest 1 View    Narrative    EXAM: XR CHEST 1 VIEW  LOCATION: [facility]  DATE/TIME: 5/18/2022 11:52 PM    INDICATION: fever  COMPARISON: 04/22/2022.      Impression    IMPRESSION: Clear lungs. Normal heart size and pulmonary vascularity. Mild calcified plaque of the aortic arch. No pneumothorax. Left subclavian pacemaker with leads at the right atrium and right ventricle.   CT Chest/Abdomen/Pelvis w Contrast    Narrative    EXAM: CT CHEST/ABDOMEN/PELVIS W CONTRAST  LOCATION: [facility]  DATE/TIME: 5/19/2022 2:37 AM    INDICATION: Falls. Fever.  COMPARISON: None.  TECHNIQUE: CT scan of the chest, abdomen, and pelvis was performed following injection of IV contrast. Multiplanar reformats were obtained. Dose reduction techniques were used.   CONTRAST: ISOVUE 370 75ML    FINDINGS:   LUNGS AND PLEURA: No infiltrate or pleural effusion. Mild basilar atelectasis.    MEDIASTINUM/AXILLAE: Cardiac leads. No adenopathy or pericardial  effusion.    CORONARY ARTERY CALCIFICATION: Severe.    HEPATOBILIARY: Hepatic steatosis. The gallbladder is contracted.    PANCREAS: Normal.    SPLEEN: Normal.    ADRENAL GLANDS: Normal.    KIDNEYS/BLADDER: Right renal cyst. No follow-up needed. No hydronephrosis.    BOWEL: Normal caliber. Normal appendix.    LYMPH NODES: Normal.    VASCULATURE: Atherosclerotic vascular calcification. Stenosis origin of the celiac artery. Dense calcification origin of the superior mesenteric artery. Ectatic infrarenal abdominal aorta.    PELVIC ORGANS: Enlarged prostate.    MUSCULOSKELETAL: Multiple subacute to remote right rib fractures. Degenerative change osseous structures.      Impression    IMPRESSION:  1.  No infiltrate or pleural effusion.  2.  No inflammatory change in abdomen and pelvis. No bowel obstruction or abscess.  3.  Multiple subacute to remote right rib fractures.

## 2022-05-19 NOTE — PROGRESS NOTES
Pt reported that he believes he swallowed his lower partial chewing on a burger downstairs in the ER. Pt reported that he spits up water when he swallows. Per Pt it does not occur with food.   Brown Mckeon RN  5/19/2022  4:32 PM

## 2022-05-19 NOTE — CONSULTS
Care Management Initial Consult    General Information  Assessment completed with: Patient, patient  Type of CM/SW Visit: Initial Assessment    Primary Care Provider verified and updated as needed: Yes   Readmission within the last 30 days: no previous admission in last 30 days      Reason for Consult: discharge planning  Advance Care Planning: Advance Care Planning Reviewed: no concerns identified          Communication Assessment  Patient's communication style: spoken language (English or Bilingual)             Cognitive  Cognitive/Neuro/Behavioral:                        Living Environment:   People in home: spouse     Current living Arrangements: assisted living      Able to return to prior arrangements: yes       Family/Social Support:  Care provided by: self  Provides care for: spouse  Marital Status:   Wife, Children  Nuria       Description of Support System: Supportive, Involved    Support Assessment: Adequate family and caregiver support    Current Resources:   Patient receiving home care services:       Community Resources:    Equipment currently used at home:    Supplies currently used at home:      Employment/Financial:  Employment Status: retired     Employment/ Comments:  ([employer] , no benefits per patient)  Financial Concerns: No concerns identified           Lifestyle & Psychosocial Needs:  Social Determinants of Health     Tobacco Use: Low Risk      Smoking Tobacco Use: Never Smoker     Smokeless Tobacco Use: Never Used   Alcohol Use: Not on file   Financial Resource Strain: Not on file   Food Insecurity: Not on file   Transportation Needs: Not on file   Physical Activity: Not on file   Stress: Not on file   Social Connections: Not on file   Intimate Partner Violence: Not on file   Depression: Not at risk     PHQ-2 Score: 1   Housing Stability: Not on file       Functional Status:  Prior to admission patient needed assistance:   Dependent ADLs:: Ambulation-cane  Dependent  IADLs:: Transportation, Meal Preparation, Cleaning, Shopping  Assesssment of Functional Status: Not at  functional baseline    Mental Health Status:          Chemical Dependency Status:                Values/Beliefs:  Spiritual, Cultural Beliefs, Mandaeism Practices, Values that affect care:                 Additional Information:  Chart reviewed.  RNCM met with patient to introduce self and review role of care management (CM), progression of care, and possible need for services at discharge.  Patient's daughter, Nichole ([phone]), called patient and participated in discussion via telephone on speaker.  Patient alert and oriented x 4.  Patient lives with his spouse at [facility].  They moved into assisted living 6 weeks ago.  Family (8 children) provides transportation and assist as needed.  Patient is independent, uses cane and provides assist to his spouse with Pulmonary Fibrosis- she is on oxygen 24/7.  Patient does have a walker available if needed.  Informed that CM will continue to follow and assist as needed with any discharge planning needs.    Yamileth Cheatham RN

## 2022-05-19 NOTE — H&P
Admission History and Physical   Lionel Chávez,    1926,   [id number]    [facility]   Fall [W19.XXXA]    PCP: Negro Emmanuel,    Code status:  Prior       Extended Emergency Contact Information  Primary Emergency Contact: James Chávez   [facility]  Home Phone: [phone]  Mobile Phone: [phone]  Relation: Spouse  Secondary Emergency Contact: REBECCA SYLVESTER  Home Phone: [phone]  Mobile Phone: [phone]  Relation: Daughter       Active Problems:    Fall       ASSESSMENT AND PLAN:  Fever and leukopenia unclear etiology.  Unremarkable UA, CT chest and abdomen with no infiltrates.  Consult ID for evaluation, send blood cultures, begin empiric antibiotics for now    Multiple subacute/right right ribs post recent falls.  Tylenol as needed for pain, PT and OT evaluation    Mental status changes per family members, with frequent falls consult neurology for evaluation.  Head CT with no stroke or bleed    ANTWON - prerenal ,  IVF with saline for now , bmp daily     Acute on chronic leukopenia.  No previous work-up noted in chart.  Continue antibiotics as above.  Await ID evaluation, hematology clinic referral post discharge for work-up if he wishes    Diabetes stable sliding scale insulin Premeal glucose checks    Hypertension stable continue home meds for now    Hypothyroidism stable continue home meds      DVT PPX:  Heparin subacute    Barriers to discharge fever for evaluation    Anticipated Discharge date obs less than 2 midnight    Neuro notes reviewed   They document afib - however ekg only with sinus rhythm   Place on tele for now and monitor       Chief Complaint:  Frequent falls and fever    HPI:  Lionel Chávez is a 89 year old old male presenting with multiple issues chief complaint appears to be fever a few days duration and mental status changes and frequent falls.  History obtained primarily from review of ED notes as patient appears slightly confused.   "Recently got a booster vaccination few days ago.  Family reports worsening confusion and frequent falls with loss of balance.  Reports he has had a couple falls but denies leg weakness, urinary incontinence or fecal incontinence.  Denies neuropathy symptoms.  Denies chest symptoms no cough no urinary symptoms no abdominal pain.      Medical History  Past Medical History:   Diagnosis Date     CAD (coronary artery disease)      DM2 (diabetes mellitus, type 2) (H)           Surgical History  He  has a past surgical history that includes Nasal Septoplasty W/ Turbinoplasty; Implantable Cardioverter-Defibrillator (ICD); and Cataract Extraction.      SOCIAL HISTORY:  Social History     Socioeconomic History     Marital status:      Spouse name: Not on file     Number of children: Not on file     Years of education: Not on file     Highest education level: Not on file   Occupational History     Not on file   Tobacco Use     Smoking status: Never Smoker     Smokeless tobacco: Never Used   Substance and Sexual Activity     Alcohol use: No     Drug use: No     Sexual activity: Not on file   Other Topics Concern     Not on file   Social History Narrative     Not on file     Social Determinants of Health     Financial Resource Strain: Not on file   Food Insecurity: Not on file   Transportation Needs: Not on file   Physical Activity: Not on file   Stress: Not on file   Social Connections: Not on file   Intimate Partner Violence: Not on file   Housing Stability: Not on file       FAMILY HISTORY:  Family History   Problem Relation Age of Onset     Hyperlipidemia Sister      Heart Disease Brother      Michael-Danlos syndrome Daughter          ALLERGIES:  Allergies   Allergen Reactions     Amiodarone Other (See Comments)     Per [facility] records \"eye changes & thyroid gland side effects\"       MEDICATIONS:  Per pharmacy      ROS:  12 point review of systems reviewed and is negative except as stated above      PHYSICAL " "EXAM:  BP (!) 175/74   Pulse 76   Temp 98.4  F (36.9  C) (Oral)   Resp 22   Ht 1.778 m (5' 10\")   Wt 84.8 kg (187 lb)   SpO2 93%   BMI 26.83 kg/m      General: Mildly confused  HEENT: Pupils equal and reactive,ENT WNL   Neck- supple, No JVP elevation, lymphadenopathy or thyromegaly. Trachea-central.  Chest: Clear to auscultation bilaterally.  Heart: S1S2 regular. No M/R/G.  Abdomen: Soft. NT, ND. No organomegaly. Bowel sounds- active.  Back: No spine tenderness. No CVA tenderness.  Extremities: No leg swelling. Peripheral pulses 2+ bilaterally.  Neuro: No focal neurological deficit  Skin: no skin rashes     DIAGNOSTIC DATA:        EKG Results: Personally reviewed        Advanced Care Planning       Andrew Collins MD       "

## 2022-05-19 NOTE — ED NOTES
CT abdomen as requested by the hospitalist has resulted and is unremarkable.  No acute findings or infectious process.  Subacute to chronic rib fractures noticed without pneumothorax or hemothorax.     Samara Hernandez MD  05/19/22 [id number]

## 2022-05-19 NOTE — ED NOTES
Pt more forgetful than normal per daughter. Pt states he has been falling a lot, denies loss of consciousness, denies hitting head. Pt states his chronic low back pain has been worse lately and he thinks that is causing him to fall. States he does get dizzy sometimes.

## 2022-05-19 NOTE — ED TRIAGE NOTES
Fell x2 today, did not hit head. Loss of balance. The falls are recurring. Had fall with 2 months ago and broke ribs. Family reports confusion. Has left leg pain. Lives at assisted living [facility]. Had covid shot on 5/17/2022. Not on blood thinners. Reports having loose stools.

## 2022-05-19 NOTE — ED NOTES
Pt's O2 sats intermittently dropping to mid-upper 80s while asleep. 2LPM open mask applied to pt.

## 2022-05-20 ENCOUNTER — PATIENT OUTREACH (OUTPATIENT)
Dept: ONCOLOGY | Facility: CLINIC | Age: 87
End: 2022-05-20
Payer: COMMERCIAL

## 2022-05-20 ENCOUNTER — APPOINTMENT (OUTPATIENT)
Dept: OCCUPATIONAL THERAPY | Facility: HOSPITAL | Age: 87
End: 2022-05-20
Attending: INTERNAL MEDICINE
Payer: COMMERCIAL

## 2022-05-20 ENCOUNTER — APPOINTMENT (OUTPATIENT)
Dept: PHYSICAL THERAPY | Facility: HOSPITAL | Age: 87
End: 2022-05-20
Attending: INTERNAL MEDICINE
Payer: COMMERCIAL

## 2022-05-20 VITALS
WEIGHT: 187 LBS | HEART RATE: 62 BPM | OXYGEN SATURATION: 95 % | RESPIRATION RATE: 16 BRPM | TEMPERATURE: 97.9 F | DIASTOLIC BLOOD PRESSURE: 72 MMHG | HEIGHT: 70 IN | SYSTOLIC BLOOD PRESSURE: 160 MMHG | BODY MASS INDEX: 26.77 KG/M2

## 2022-05-20 LAB
ANION GAP SERPL CALCULATED.3IONS-SCNC: 5 MMOL/L (ref 5–18)
BACTERIA UR CULT: NO GROWTH
BUN SERPL-MCNC: 20 MG/DL (ref 8–28)
CALCIUM SERPL-MCNC: 8.7 MG/DL (ref 8.5–10.5)
CHLORIDE BLD-SCNC: 101 MMOL/L (ref 98–107)
CO2 SERPL-SCNC: 24 MMOL/L (ref 22–31)
CREAT SERPL-MCNC: 0.86 MG/DL (ref 0.7–1.3)
ERYTHROCYTE [DISTWIDTH] IN BLOOD BY AUTOMATED COUNT: 14.1 % (ref 10–15)
GFR SERPL CREATININE-BSD FRML MDRD: 80 ML/MIN/1.73M2
GLUCOSE BLD-MCNC: 235 MG/DL (ref 70–125)
HCT VFR BLD AUTO: 30.5 % (ref 40–53)
HGB BLD-MCNC: 10.1 G/DL (ref 13.3–17.7)
MCH RBC QN AUTO: 30 PG (ref 26.5–33)
MCHC RBC AUTO-ENTMCNC: 33.1 G/DL (ref 31.5–36.5)
MCV RBC AUTO: 91 FL (ref 78–100)
PATH REPORT.COMMENTS IMP SPEC: NORMAL
PATH REPORT.COMMENTS IMP SPEC: NORMAL
PATH REPORT.FINAL DX SPEC: NORMAL
PATH REPORT.RELEVANT HX SPEC: NORMAL
PLATELET # BLD AUTO: 132 10E3/UL (ref 150–450)
POTASSIUM BLD-SCNC: 4 MMOL/L (ref 3.5–5)
RBC # BLD AUTO: 3.37 10E6/UL (ref 4.4–5.9)
SODIUM SERPL-SCNC: 130 MMOL/L (ref 136–145)
WBC # BLD AUTO: 1.9 10E3/UL (ref 4–11)

## 2022-05-20 PROCEDURE — G0378 HOSPITAL OBSERVATION PER HR: HCPCS

## 2022-05-20 PROCEDURE — 250N000013 HC RX MED GY IP 250 OP 250 PS 637: Performed by: INTERNAL MEDICINE

## 2022-05-20 PROCEDURE — 99212 OFFICE O/P EST SF 10 MIN: CPT | Performed by: INTERNAL MEDICINE

## 2022-05-20 PROCEDURE — 99217 PR OBSERVATION CARE DISCHARGE: CPT | Performed by: INTERNAL MEDICINE

## 2022-05-20 PROCEDURE — 97535 SELF CARE MNGMENT TRAINING: CPT | Mod: GO

## 2022-05-20 PROCEDURE — 97165 OT EVAL LOW COMPLEX 30 MIN: CPT | Mod: GO

## 2022-05-20 PROCEDURE — 80048 BASIC METABOLIC PNL TOTAL CA: CPT | Performed by: INTERNAL MEDICINE

## 2022-05-20 PROCEDURE — 97161 PT EVAL LOW COMPLEX 20 MIN: CPT | Mod: GP

## 2022-05-20 PROCEDURE — 36415 COLL VENOUS BLD VENIPUNCTURE: CPT | Performed by: INTERNAL MEDICINE

## 2022-05-20 PROCEDURE — 97116 GAIT TRAINING THERAPY: CPT | Mod: GP

## 2022-05-20 PROCEDURE — 85027 COMPLETE CBC AUTOMATED: CPT | Performed by: INTERNAL MEDICINE

## 2022-05-20 PROCEDURE — 250N000013 HC RX MED GY IP 250 OP 250 PS 637: Performed by: HOSPITALIST

## 2022-05-20 RX ADMIN — Medication 1 CAPSULE: at 08:55

## 2022-05-20 RX ADMIN — ACETAMINOPHEN 325 MG: 325 TABLET ORAL at 09:07

## 2022-05-20 RX ADMIN — Medication 1 TABLET: at 08:56

## 2022-05-20 RX ADMIN — Medication 5 MG: at 00:41

## 2022-05-20 RX ADMIN — Medication 81 MG: at 08:55

## 2022-05-20 RX ADMIN — IRBESARTAN 150 MG: 150 TABLET, FILM COATED ORAL at 08:55

## 2022-05-20 RX ADMIN — TAMSULOSIN HYDROCHLORIDE 0.4 MG: 0.4 CAPSULE ORAL at 08:56

## 2022-05-20 RX ADMIN — LEVOTHYROXINE SODIUM 125 MCG: 0.03 TABLET ORAL at 08:56

## 2022-05-20 RX ADMIN — METOPROLOL SUCCINATE 12.5 MG: 25 TABLET, EXTENDED RELEASE ORAL at 08:55

## 2022-05-20 RX ADMIN — ATORVASTATIN CALCIUM 80 MG: 40 TABLET, FILM COATED ORAL at 08:56

## 2022-05-20 RX ADMIN — ACETAMINOPHEN 325 MG: 325 TABLET ORAL at 00:41

## 2022-05-20 RX ADMIN — Medication 1000 MCG: at 08:56

## 2022-05-20 NOTE — PLAN OF CARE
"PRIMARY DIAGNOSIS: \"GENERIC\" NURSING  OUTPATIENT/OBSERVATION GOALS TO BE MET BEFORE DISCHARGE:  ADLs back to baseline: No    Activity and level of assistance: Up with maximum assistance. Consider SW and/or PT evaluation.     Pain status: Improved-controlled with oral pain medications.    Return to near baseline physical activity: Yes     Discharge Planner Nurse   Safe discharge environment identified: Yes  Barriers to discharge: Yes       Entered by: Andria Wiley RN 05/20/2022 10:02 AM   No PT/OT eval placed at this time. MD notified. Pt c/o entire left leg pain only with moving leg. Tylenol available prn.  Please review provider order for any additional goals.   Nurse to notify provider when observation goals have been met and patient is ready for discharge.Goal Outcome Evaluation:                      "

## 2022-05-20 NOTE — PLAN OF CARE
"  Problem: Plan of Care - These are the overarching goals to be used throughout the patient stay.    Goal: Plan of Care Review/Shift Note  Description: The Plan of Care Review/Shift note should be completed every shift.  The Outcome Evaluation is a brief statement about your assessment that the patient is improving, declining, or no change.  This information will be displayed automatically on your shift note.  Outcome: Met  Goal: Patient-Specific Goal (Individualized)  Description: You can add care plan individualizations to a care plan. Examples of Individualization might be:  \"Parent requests to be called daily at 9am for status\", \"I have a hard time hearing out of my right ear\", or \"Do not touch me to wake me up as it startles me\".  Outcome: Met  Goal: Absence of Hospital-Acquired Illness or Injury  Outcome: Met  Intervention: Identify and Manage Fall Risk  Recent Flowsheet Documentation  Taken 5/20/2022 0930 by Andria Wiley RN  Safety Promotion/Fall Prevention:   activity supervised   bed alarm on   lighting adjusted   mobility aid in reach   nonskid shoes/slippers when out of bed   patient and family education   safety round/check completed   room organization consistent  Intervention: Prevent Skin Injury  Recent Flowsheet Documentation  Taken 5/20/2022 0930 by Andria Wiley RN  Body Position: (boosted)   heels elevated   weight shifting   other (see comments)  Intervention: Prevent and Manage VTE (Venous Thromboembolism) Risk  Recent Flowsheet Documentation  Taken 5/20/2022 0930 by Andria Wiley RN  Activity Management: activity adjusted per tolerance  Goal: Optimal Comfort and Wellbeing  Outcome: Met  Intervention: Monitor Pain and Promote Comfort  Recent Flowsheet Documentation  Taken 5/20/2022 0900 by Andria Wiley RN  Pain Management Interventions:   medication (see MAR)   pillow support provided  Goal: Readiness for Transition of Care  Outcome: Met     Problem: Plan of Care - " These are the overarching goals to be used throughout the patient stay.    Goal: Readiness for Transition of Care  Outcome: Met  Pt ok to discharge home with daughter. PT/OT evaluation completed. Paperwork to be given at this time.     Goal Outcome Evaluation:

## 2022-05-20 NOTE — PROGRESS NOTES
[facility]      OUTPATIENT OCCUPATIONAL THERAPY  EVALUATION  PLAN OF TREATMENT FOR OUTPATIENT REHABILITATION  (COMPLETE FOR INITIAL CLAIMS ONLY)  Patient's Last Name, First Name, M.I.  YOB: 1926  MargaritoLionel                          Provider's Name  [facility] Medical Record No.  [id number]                               Onset Date:  05/18/22   Start of Care Date:        Type:     ___PT   _X_OT   ___SLP Medical Diagnosis:                           OT Diagnosis:  decreased activity tolerance for ADLs   Visits from SOC:  1   _________________________________________________________________________________  Plan of Treatment/Functional Goals    Planned Interventions: progressive activity/exercise, home program guidelines   Goals: See Occupational Therapy Goals on Care Plan in [facility] electronic health record.    Therapy Frequency: One time eval and treatment  Predicted Duration of Therapy Intervention: 05/20/22  _________________________________________________________________________________    I CERTIFY THE NEED FOR THESE SERVICES FURNISHED UNDER        THIS PLAN OF TREATMENT AND WHILE UNDER MY CARE     (Physician co-signature of this document indicates review and certification of the therapy plan).              Certification date from: (P) 05/20/22,      Referring Physician: Ayaan Rinaldi            Initial Assessment        See Occupational Therapy evaluation dated   in Epic electronic health record.

## 2022-05-20 NOTE — PROGRESS NOTES
[facility]      OUTPATIENT PHYSICAL THERAPY EVALUATION  PLAN OF TREATMENT FOR OUTPATIENT REHABILITATION  (COMPLETE FOR INITIAL CLAIMS ONLY)  Patient's Last Name, First Name, M.I.  YOB: 1926  Lionel Chávez                        Provider's Name  [facility] Medical Record No.  [id number]                               Onset Date:  05/18/22   Start of Care Date:  05/20/22      Type:     _X_PT   ___OT   ___SLP Medical Diagnosis:  falls, dehydration, AMS                        PT Diagnosis:  falls, decreased functional mobility   Visits from SOC:  1   _________________________________________________________________________________  Plan of Treatment/Functional Goals    Planned Interventions: transfer training, gait training     Goals: See Physical Therapy Goals on Care Plan in [facility] electronic health record.    Therapy Frequency: One time eval and treatment only  Predicted Duration of Therapy Intervention: 05/20/22  _________________________________________________________________________________    I CERTIFY THE NEED FOR THESE SERVICES FURNISHED UNDER        THIS PLAN OF TREATMENT AND WHILE UNDER MY CARE     (Physician co-signature of this document indicates review and certification of the therapy plan).              Certification date from: 05/20/22, Certification date to: 05/27/22    Referring Physician: SAPNA Rinaldi            Initial Assessment        See Physical Therapy evaluation dated 05/20/22 in Epic electronic health record.

## 2022-05-20 NOTE — PROGRESS NOTES
Referral received for benign heme services, see below.    Referral reason: pancytopenia    Current abnormal labs:   Lab Results   Component Value Date    WBC 1.9 05/20/2022     Lab Results   Component Value Date    RBC 3.37 05/20/2022     Lab Results   Component Value Date    HGB 10.1 05/20/2022     Lab Results   Component Value Date    HCT 30.5 05/20/2022     Lab Results   Component Value Date    MCV 91 05/20/2022     Lab Results   Component Value Date    MCH 30.0 05/20/2022     Lab Results   Component Value Date    MCHC 33.1 05/20/2022     Lab Results   Component Value Date    RDW 14.1 05/20/2022     Lab Results   Component Value Date     05/20/2022         Preferred location per patient or referral: [facility]    Outreach: Call not placed to patient regarding referral.    Plan: Internal Referral: No additional work-up needed, scheduling instructions updated, referral transferred to NPS for completion.

## 2022-05-20 NOTE — PLAN OF CARE
"PRIMARY DIAGNOSIS: \"GENERIC\" NURSING  OUTPATIENT/OBSERVATION GOALS TO BE MET BEFORE DISCHARGE:  ADLs back to baseline: No    Activity and level of assistance: Up with standby assistance.    Pain status: Improved-controlled with oral pain medications.    Return to near baseline physical activity: No     Discharge Planner Nurse   Safe discharge environment identified: No  Barriers to discharge: Yes       Entered by: Dario Galindo RN 05/20/2022 1:45 AM     Please review provider order for any additional goals.   Nurse to notify provider when observation goals have been met and patient is ready for discharge.Goal Outcome Evaluation:                      "

## 2022-05-20 NOTE — PLAN OF CARE
Goal Outcome Evaluation:                      Problem: Plan of Care - These are the overarching goals to be used throughout the patient stay.    Goal: Absence of Hospital-Acquired Illness or Injury  Intervention: Identify and Manage Fall Risk  Recent Flowsheet Documentation  Taken 5/19/2022 1642 by Brown Mckeon, RN  Safety Promotion/Fall Prevention:   activity supervised   nonskid shoes/slippers when out of bed   supervised activity  Taken 5/19/2022 1424 by Brown Mckeon, RN  Safety Promotion/Fall Prevention:   activity supervised   nonskid shoes/slippers when out of bed   supervised activity     Problem: Plan of Care - These are the overarching goals to be used throughout the patient stay.    Goal: Optimal Comfort and Wellbeing  Intervention: Monitor Pain and Promote Comfort  Recent Flowsheet Documentation  Taken 5/19/2022 1617 by Brown Mckeon, RN  Pain Management Interventions:   relaxation techniques promoted   emotional support   Pt reported minor pain in left leg. Pt utilizing male purewick to wick away urinary incontinence. Pt had legs and bottom cleansed after Pt used purewick while it was not connected. Pt heart rhythm sinus rhythm. Pocket talker requested. No other concerns noted.

## 2022-05-20 NOTE — PLAN OF CARE
Occupational Therapy Discharge Summary    Reason for therapy discharge:    Discharged home.    Progress towards therapy goal(s). See goals on Care Plan in [facility] electronic health record for goal details.  Goals met.    Therapy recommendation(s):    Rec home w/assist.

## 2022-05-20 NOTE — PLAN OF CARE
"PRIMARY DIAGNOSIS: \"GENERIC\" NURSING  OUTPATIENT/OBSERVATION GOALS TO BE MET BEFORE DISCHARGE:  ADLs back to baseline: No    Activity and level of assistance: Up with maximum assistance. Consider SW and/or PT evaluation.     Pain status: Improved-controlled with oral pain medications.    Return to near baseline physical activity: No     Discharge Planner Nurse   Safe discharge environment identified: No  Barriers to discharge: Yes       Entered by: Dario Galindo RN 05/20/2022 5:05 AM     Please review provider order for any additional goals.   Nurse to notify provider when observation goals have been met and patient is ready for discharge.Goal Outcome Evaluation:                      "

## 2022-05-20 NOTE — PROGRESS NOTES
05/20/22 1315   Quick Adds   Quick Adds Certification   Type of Visit Initial PT Evaluation   Living Environment   Living Environment Comments per OT   Self-Care   Usual Activity Tolerance good   Current Activity Tolerance moderate   Equipment Currently Used at Home cane, straight;walker, rolling   Fall history within last six months yes   Activity/Exercise/Self-Care Comment Indep at baseline, assists wife with her cares   General Information   Onset of Illness/Injury or Date of Surgery 05/18/22   Referring Physician SAPNA Rinaldi   Patient/Family Therapy Goals Statement (PT) return to detention later today   Pertinent History of Current Problem (include personal factors and/or comorbidities that impact the POC) admit after several falls, AMS, dehydration   Existing Precautions/Restrictions fall   General Observations very motivated to participate with PT, states he is feeling back to normal   Cognition   Affect/Mental Status (Cognition) WFL   Orientation Status (Cognition) oriented x 4   Follows Commands (Cognition) WFL   Pain Assessment   Patient Currently in Pain No   Range of Motion (ROM)   ROM Comment LE ROM WFL   Strength (Manual Muscle Testing)   Strength Comments LE strength WFL   Transfers   Transfers sit-stand transfer   Sit-Stand Transfer   Sit-Stand [place] (Transfers) modified independence   Assistive Device (Sit-Stand Transfers) walker, 4-wheeled   Comment, (Sit-Stand Transfer) reminders for safe hand placement, initial standing balance wiwth 4WW is good   Gait/Stairs (Locomotion)   [place] Level (Gait) supervision   Assistive Device (Gait) walker, 4-wheeled   Distance in Feet (Required for LE Total Joints) 300'   Pattern (Gait) step-through   Comment, (Gait/Stairs) steady pace, no LOB, safe on turns   Balance   Balance Comments encouraged full time use of 4WW for better balance and to reduce the risk of falls   Clinical Impression   Criteria for Skilled Therapeutic Intervention Yes, treatment  indicated   PT Diagnosis (PT) falls, decreased functional mobility   Influenced by the following impairments balance, fatigue   Functional limitations due to impairments gait   Clinical Presentation (PT Evaluation Complexity) Stable/Uncomplicated   Clinical Presentation Rationale presents as diagnosed   Clinical Decision Making (Complexity) low complexity   Planned Therapy Interventions (PT) transfer training;gait training   Anticipated Equipment Needs at Discharge (PT) walker, rolling  (4WW)   Risk & Benefits of therapy have been explained evaluation/treatment results reviewed;patient;daughter   PT Discharge Planning   PT Discharge Recommendation (DC Rec) home with assist   PT Rationale for DC Rec return to [facility], family assist with pt ans wife as needed   PT Brief overview of current status pt tolerated PT well   Therapy Certification   Start of care date 05/20/22   Certification date from 05/20/22   Certification date to 05/27/22   Medical Diagnosis falls, dehydration, AMS   Total Evaluation Time   Total Evaluation Time (Minutes) 10   Physical Therapy Goals   PT Frequency One time eval and treatment only   PT Predicted Duration/Target Date for Goal Attainment 05/20/22   PT Goals Transfers;Gait   PT: Transfers Independent   PT: Gait Modified independent;Supervision/stand-by assist;Rolling walker;Greater than 200 feet

## 2022-05-20 NOTE — PROGRESS NOTES
Physical Therapy Discharge Summary    Reason for therapy discharge:    Discharged to [facility]    Progress towards therapy goal(s). See goals on Care Plan in [facility] electronic health record for goal details.  Goals met    Therapy recommendation(s):    No further therapy is recommended.

## 2022-05-20 NOTE — DISCHARGE SUMMARY
[facility] MEDICINE  DISCHARGE SUMMARY     Primary Care Physician: Negro Emmanuel  Admission Date: 5/18/2022   Discharge Provider: KELL Hills Discharge Date: 5/20/2022   Diet: Diabetic   Code Status: Full Code   Activity: DCACTIVITY: Activity as tolerated        Condition at Discharge: Stable     REASON FOR PRESENTATION(See Admission Note for Details)   Falls    PRINCIPAL & ACTIVE DISCHARGE DIAGNOSES     Active Problems:    Fall      PENDING LABS     Unresulted Labs Ordered in the Past 30 Days of this Admission     Date and Time Order Name Status Description    5/18/2022 10:51 PM Blood Culture Peripheral Blood Preliminary     5/18/2022 10:51 PM Blood Culture Peripheral Blood Preliminary             PROCEDURES ( this hospitalization only)          RECOMMENDATIONS TO OUTPATIENT PROVIDER FOR F/U VISIT     Follow-up Appointments     Follow-up and recommended labs and tests       Follow up with primary care provider, Negro Emmanuel, within 7 days for   hospital follow- up.                 DISPOSITION     Home with home care    SUMMARY OF HOSPITAL COURSE:    89 YO gentleman with PMH of hypertensin, CAD, high grade AV block s/p pacer, DM-II, unsteady gait with frequent falls who was brought too our ED for evaluation of falls and fever. Please review the admission H&P for details. In summary,    Fever  -- Patient apparently had a  Fever of 100.2F at home. No fever here since admission. all the work up has been negative for infection.  Unremarkable UA, CT chest and abdomen with no infiltrates. Was on empiric IV zosyn on admission. ID discontinued the antibiotics. No antibiotics needed unless the culture comes back positive per ID.  It was felt that the fever was due to the recent COVID booster.       Frequent falls:  -- Not new. Evaluated by neurology. No further inpatient work up recommended  -- Patient lives in an assisted living facility with his wife. He provides care  to his spouse who has dementia and pulm fibrosis. Patient doesn't want to go to TCU.    Multiple subacute/right right ribs post recent falls.  Tylenol as needed for pain, PT and OT evaluation     Altered mental status  Acute metabolic encephalopathy  -- Patient was slightly confused per family members. This could be from the boster shot, dehydration and/or fever itself. Head CT with no stroke or bleed  -- Patient is at baseline during my visit.      ANTWON   -- Prerenal due to dehydration   -- Improved with IVF     Pancytopenia:  -- No need for transfusion. Will refer to hematology upon discharge for further work up.     Diabetes mellitus  -- Resume home meds     Hypertension   -- Resume home meds     Hypothyroidism   -- Cont synthroid    Discharge Medications with Med changes:     Current Discharge Medication List      CONTINUE these medications which have NOT CHANGED    Details   acetaminophen (TYLENOL) 500 MG tablet Take 1,000 mg by mouth every 6 hours as needed for mild pain      aspirin 81 MG EC tablet [ASPIRIN 81 MG EC TABLET] Take 81 mg by mouth daily.      atorvastatin (LIPITOR) 80 MG tablet [ATORVASTATIN (LIPITOR) 80 MG TABLET] TAKE 1 TABLET BY MOUTH EVERYDAY AT BEDTIME  Qty: 90 tablet, Refills: 2    Associated Diagnoses: Other hyperlipidemia      cyanocobalamin (VITAMIN B-12) 1000 MCG SUBL sublingual tablet Place 1 tablet (1,000 mcg) under the tongue daily  Qty: 90 tablet, Refills: 0    Associated Diagnoses: Normocytic anemia      hydrochlorothiazide (HYDRODIURIL) 25 MG tablet Take 12.5 mg by mouth daily      irbesartan (AVAPRO) 300 MG tablet Take 1 tablet (300 mg) by mouth daily  Qty: 30 tablet, Refills: 0    Associated Diagnoses: Asymptomatic hypertensive urgency      levothyroxine (SYNTHROID, LEVOTHROID) 125 MCG tablet [LEVOTHYROXINE (SYNTHROID, LEVOTHROID) 125 MCG TABLET] TAKE 1 TABLET BY MOUTH EVERY DAY  Qty: 90 tablet, Refills: 3    Associated Diagnoses: Hypothyroidism, unspecified type      metFORMIN  (GLUCOPHAGE XR) 500 MG 24 hr tablet Take 500 mg by mouth daily (with dinner)      metoprolol succinate (TOPROL-XL) 25 MG [METOPROLOL SUCCINATE (TOPROL-XL) 25 MG] TAKE 1/2 TABLET BY MOUTH 2 TIMES DAILY  Qty: 90 tablet, Refills: 3    Associated Diagnoses: Essential hypertension      mirtazapine (REMERON) 7.5 MG tablet Take 7.5 mg by mouth At Bedtime      multivitamin  with lutein (OCUVITE WITH LUTEIN) CAPS per capsule Take 1 capsule by mouth 2 times daily      multivitamin (CENTRUM SILVER) tablet Take 1 tablet by mouth daily      omega-3 fatty acids-fish oil 340-1,000 mg cap [OMEGA-3 FATTY ACIDS-FISH -1,000 MG CAP] Take 1 capsule by mouth daily.       traZODone (DESYREL) 50 MG tablet Take 50 mg by mouth At Bedtime      blood glucose test (ONETOUCH VERIO TEST STRIPS) strips [BLOOD GLUCOSE TEST (ONETOUCH VERIO TEST STRIPS) STRIPS] USE TO TEST BLOOD SUGAR 1-2 TIMES DAILY.  Qty: 200 strip, Refills: 11    Associated Diagnoses: Type 2 diabetes mellitus (H)      !! lancets (ONETOUCH DELICA LANCETS) 30 gauge Misc [LANCETS (ONETOUCH DELICA LANCETS) 30 GAUGE MISC] Check blood glucose daily.  Qty: 100 each, Refills: 3    Associated Diagnoses: Type 2 diabetes mellitus (H)      !! lancets 33 gauge Misc [LANCETS 33 GAUGE MISC] 2 (two) times a day.      tamsulosin (FLOMAX) 0.4 MG capsule Take 0.4 mg by mouth daily       !! - Potential duplicate medications found. Please discuss with provider.                Rationale for medication changes:      Please see the summary above        Consults   Neurology and ID      Immunizations given this encounter     Most Recent Immunizations   Administered Date(s) Administered     COVID-19,PF,Pfizer (12+ Yrs) 10/15/2021     FLUAD(HD)65+ QUAD 10/05/2021     Flu, Unspecified 11/21/2016     Influenza (High Dose) 3 valent vaccine 09/29/2019     Influenza (IIV3) PF 08/27/2009     Influenza, Quad, High Dose, Pf, 65yr+ (Fluzone HD) 09/21/2020     Pneumo Conj 13-V (2010&after) 06/04/2015      Pneumococcal 23 valent 06/30/2010     TD (ADULT, 7+) 07/27/2020     Td,adult,historic,unspecified 06/30/2010           Anticoagulation Information      Recent INR results: No results for input(s): INR in the last 168 hours.  Warfarin doses (if applicable) or name of other anticoagulant: NA      SIGNIFICANT IMAGING FINDINGS     Results for orders placed or performed during the hospital encounter of 05/18/22   Head CT w/o contrast    Impression    IMPRESSION:  1.  No acute intracranial process.   CT Facial Bones without Contrast    Impression    IMPRESSION:   1.  Mild bilateral chronic maxillary sinus disease.  2.  Edentulous.  3.  No acute findings.     Cervical spine CT w/o contrast    Impression    IMPRESSION:  1.  No fracture or posttraumatic subluxation.   Lumbar spine CT w/o contrast    Impression    IMPRESSION:  1.  No fracture or posttraumatic subluxation.     CT Thoracic Spine w/o Contrast    Impression    IMPRESSION:  1.  No fracture or posttraumatic subluxation.  2.  No high-grade spinal canal or neural foraminal stenosis.     XR Chest 1 View    Impression    IMPRESSION: Clear lungs. Normal heart size and pulmonary vascularity. Mild calcified plaque of the aortic arch. No pneumothorax. Left subclavian pacemaker with leads at the right atrium and right ventricle.   XR Pelvis w Hip Left G/E 2 Views    Impression    IMPRESSION: No visible fracture or dislocation. Vascular calcification.   CT Chest/Abdomen/Pelvis w Contrast    Impression    IMPRESSION:  1.  No infiltrate or pleural effusion.  2.  No inflammatory change in abdomen and pelvis. No bowel obstruction or abscess.  3.  Multiple subacute to remote right rib fractures.       SIGNIFICANT LABORATORY FINDINGS     Most Recent 3 CBC's:Recent Labs   Lab Test 05/20/22  1044 05/19/22  1649 05/18/22  2320   WBC 1.9* 1.5* 1.8*   HGB 10.1* 10.2* 9.8*   MCV 91 90 91   * 128* 139*     Most Recent 3 BMP's:Recent Labs   Lab Test 05/20/22  1044 05/18/22  2320  "03/10/22  1127 03/08/22  0820 03/08/22  0635   * 133*  --   --  133*   POTASSIUM 4.0 4.5  --   --  3.9   CHLORIDE 101 102  --   --  102   CO2 24 20*  --   --  23   BUN 20 35*  --   --  16   CR 0.86 1.40*  --   --  0.74   ANIONGAP 5 11  --   --  8   MERLYN 8.7 9.1  --   --  9.1   * 155* 214*   < > 113    < > = values in this interval not displayed.         Discharge Orders        Home Care Referral      Reason for your hospital stay    Falls     Follow-up and recommended labs and tests     Follow up with primary care provider, Negro Emmanuel, within 7 days for hospital follow- up.     Activity    Your activity upon discharge: activity as tolerated     Diet    Follow this diet upon discharge: Orders Placed This Encounter      2 Gram Sodium Diet       Examination   BP (!) 160/72 (BP Location: Right arm)   Pulse 62   Temp 97.9  F (36.6  C) (Oral)   Resp 16   Ht 1.778 m (5' 10\")   Wt 84.8 kg (187 lb)   SpO2 95%   BMI 26.83 kg/m        General: Not in obvious distress.  HEENT: NC, AT   Chest: Clear to auscultation bilaterally  Heart: S1S2 normal, regular. No M/R/G  Abdomen: Soft. NT, ND. Bowel sounds- active.  Extremities: No legs swelling  Neuro: Alert and awake, grossly non-focal      Please see EMR for more detailed significant labs, imaging, consultant notes etc.    I, KELL Hills, personally saw the patient today and spent greater than 30 minutes discharging this patient.    KELL Hills  [facility]    CC:Negro Emmanuel    "

## 2022-05-20 NOTE — PROGRESS NOTES
Care Management Discharge Note    Discharge Date: 05/20/2022       Discharge Disposition: Assisted Living    Discharge Services:  Home Care PT    Discharge DME:      Discharge Transportation: family or friend will provide    Private pay costs discussed: Not applicable    PAS Confirmation Code:    Patient/family educated on Medicare website which has current facility and service quality ratings:      Education Provided on the Discharge Plan:  yes  Persons Notified of Discharge Plans: Pt, daughter, [facility] nurse,   Patient/Family in Agreement with the Plan: yes    Handoff Referral Completed: Yes    Additional Information:  СЕРГЕЙ met with pt and daughter. Pt getting ready for discharge. СЕРГЕЙ informed them of home care PT orders. Pt agreeable. Daughter requests to use [facility] as pt at [facility]. They report pt does not get any services from [facility] but are agreeable to  updating [facility] nurse on the discharge plan.    СЕРГЕЙ spoke with Breanne at [facility]. She reports understanding of pts discharge. She requests orders be sent to [phone].     СЕРГЕЙ faxed home care referral and orders to [facility]. СЕРГЕЙ placed call to [facility] ([phone]). СЕРГЕЙ left message with answering service requesting call back.  Information entered into AVS. СЕРГЕЙ faxed orders to [facility].    СЕРГЕЙ received VM from Landon with [facility]. He reports plan to review pts paperwork and call SW back if there are questions but should be able to accept pt for start of care early next week.    JAYLEN Myers

## 2022-05-20 NOTE — DISCHARGE INSTRUCTIONS
Home care services have been arranged for the patient.  Home Care Agency: [facility]  Home Care Phone Number: [phone]  Services: Physical therapy  Instructions: Home care will call to schedule first visit.

## 2022-05-20 NOTE — PLAN OF CARE
Problem: Plan of Care - These are the overarching goals to be used throughout the patient stay.    Goal: Optimal Comfort and Wellbeing  5/19/2022 2224 by Brown Mckeon RN  Outcome: Ongoing, Progressing  5/19/2022 1918 by Brown Mckeon RN  Outcome: Ongoing, Progressing  Intervention: Monitor Pain and Promote Comfort  Recent Flowsheet Documentation  Taken 5/19/2022 1617 by Brown Mckeon, RN  Pain Management Interventions:   relaxation techniques promoted   emotional support     Problem: Plan of Care - These are the overarching goals to be used throughout the patient stay.    Goal: Optimal Comfort and Wellbeing  Intervention: Monitor Pain and Promote Comfort  Recent Flowsheet Documentation  Taken 5/19/2022 1617 by Brown Mckeon RN  Pain Management Interventions:   relaxation techniques promoted   emotional support     Problem: Plan of Care - These are the overarching goals to be used throughout the patient stay.    Goal: Readiness for Transition of Care  5/19/2022 2224 by Brown Mckeon RN  Outcome: Ongoing, Progressing  5/19/2022 1918 by Brown Mckeon RN  Outcome: Ongoing, Progressing   Goal Outcome Evaluation:        Pt's daughter, Nichole, updated via phone call. Pt's pain dropped from 5/10 to 4/10 following PRN PO acetaminophen. Pt remains alert and oriented. Pt is tolerating continuous IV NS infusion well. No other concerns noted.   Brown Mckeon RN  5/19/2022  10:34 PM

## 2022-05-20 NOTE — PROGRESS NOTES
"INFECTIOUS DISEASE FOLLOW UP NOTE    Date: 2022   CHIEF COMPLAINT:   Chief Complaint   Patient presents with     Fall     Altered Mental Status     loss of balance        ASSESSMENT:    1. Fall: seen by neurology without major concerns. Plan PT evaluation.  2. Leukopenia: ongoing for at least a few months. ANC >500 and afebrile so does not need antibiotic coverage for this based on WBC count. Now with pancytopenia. CT/exam without clear infection.   3. ANTWON  4. Pacemaker, afib  5. Recent COVID vaccination: could explain temp of 100.2. remains afebrile.      PLAN:  - off antibiotics, afebrile  - peripheral smear pending-can follow up with PCP  - discussed with patient and family  - ID will sign off. Please call with additional questions or change in clinical status.     Julia Alex MD  [facility]   On-Call: [phone]     ______________________________________________________________________    SUBJECTIVE / INTERVAL HISTORY: feels good. Afebrile.     ROS: All other systems negative except as listed above.    SH/FH/Habits/PMH reviewed and unchanged.    OBJECTIVE:  BP (!) 160/72 (BP Location: Right arm)   Pulse 62   Temp 97.9  F (36.6  C) (Oral)   Resp 16   Ht 1.778 m (5' 10\")   Wt 84.8 kg (187 lb)   SpO2 95%   BMI 26.83 kg/m       Resp: 16      Vital Signs  Temp: 97.9  F (36.6  C)  Temp src: Oral  Resp: 16  Pulse: 62  Pulse Rate Source: Monitor  BP: (!) 160/72 (RN notified)  BP Location: Right arm    Temp (24hrs), Av.8  F (37.1  C), Min:97.9  F (36.6  C), Max:99.9  F (37.7  C)      GEN: No acute distress.    RESPIRATORY:  Normal breathing pattern.   CARDIOVASCULAR:  Regular   EXTREMITIES: No edema.  SKIN/HAIR/NAILS:  No rashes  IV: peripheral IV      Antibiotics:  Off zosyn    Pertinent labs:  No results found for: CRP   CBC RESULTS:   Recent Labs   Lab Test 22  1044   WBC 1.9*   RBC 3.37*   HGB 10.1*   HCT 30.5*   MCV 91   MCH 30.0   MCHC 33.1   RDW 14.1   PLT " 132*      Last Comprehensive Metabolic Panel:  Sodium   Date Value Ref Range Status   05/20/2022 130 (L) 136 - 145 mmol/L Final     Potassium   Date Value Ref Range Status   05/20/2022 4.0 3.5 - 5.0 mmol/L Final     Chloride   Date Value Ref Range Status   05/20/2022 101 98 - 107 mmol/L Final     Carbon Dioxide (CO2)   Date Value Ref Range Status   05/20/2022 24 22 - 31 mmol/L Final     Anion Gap   Date Value Ref Range Status   05/20/2022 5 5 - 18 mmol/L Final     Glucose   Date Value Ref Range Status   05/20/2022 235 (H) 70 - 125 mg/dL Final     Urea Nitrogen   Date Value Ref Range Status   05/20/2022 20 8 - 28 mg/dL Final     Creatinine   Date Value Ref Range Status   05/20/2022 0.86 0.70 - 1.30 mg/dL Final     GFR Estimate   Date Value Ref Range Status   05/20/2022 80 >60 mL/min/1.73m2 Final     Comment:     Effective December 21, 2021 eGFRcr in adults is calculated using the 2021 CKD-EPI creatinine equation which includes age and gender (Pat et al., NEJ, DOI: 10.1056/UDROrc5805800)   07/27/2020 >60 >60 mL/min/1.73m2 Final     Calcium   Date Value Ref Range Status   05/20/2022 8.7 8.5 - 10.5 mg/dL Final        MICROBIOLOGY DATA:  Personally reviewed.  UC negative  BC NGTD    RADIOLOGY:  Personally Reviewed.  No results found for this or any previous visit (from the past 24 hour(s)).    Active Problems:    Fall

## 2022-05-21 DIAGNOSIS — Z71.89 OTHER SPECIFIED COUNSELING: ICD-10-CM

## 2022-05-22 ENCOUNTER — PATIENT OUTREACH (OUTPATIENT)
Dept: CARE COORDINATION | Facility: CLINIC | Age: 87
End: 2022-05-22
Payer: COMMERCIAL

## 2022-05-22 NOTE — PROGRESS NOTES
"Clinic Care Coordination Contact  [facility]: Post-Discharge Note  SITUATION                                                      Admission:    Admission Date: 05/18/22   Reason for Admission: Fall  Discharge:   Discharge Date: 05/20/22  Discharge Diagnosis: Fall    BACKGROUND                                                      Per hospital discharge summary and inpatient provider notes:    \"89 YO gentleman with PMH of hypertensin, CAD, high grade AV block s/p pacer, DM-II, unsteady gait with frequent falls who was brought too our ED for evaluation of falls and fever. Please review the admission H&P for details. In summary,     Fever  -- Patient apparently had a  Fever of 100.2F at home. No fever here since admission. all the work up has been negative for infection.  Unremarkable UA, CT chest and abdomen with no infiltrates. Was on empiric IV zosyn on admission. ID discontinued the antibiotics. No antibiotics needed unless the culture comes back positive per ID.  It was felt that the fever was due to the recent COVID booster.        Frequent falls:  -- Not new. Evaluated by neurology. No further inpatient work up recommended  -- Patient lives in an assisted living facility with his wife. He provides care to his spouse who has dementia and pulm fibrosis. Patient doesn't want to go to TCU.     Multiple subacute/right right ribs post recent falls.  Tylenol as needed for pain, PT and OT evaluation     Altered mental status  Acute metabolic encephalopathy  -- Patient was slightly confused per family members. This could be from the boster shot, dehydration and/or fever itself. Head CT with no stroke or bleed  -- Patient is at baseline during my visit.      ANTWON   -- Prerenal due to dehydration   -- Improved with IVF     Pancytopenia:  -- No need for transfusion. Will refer to hematology upon discharge for further work up.     Diabetes mellitus  -- Resume home meds     Hypertension   -- Resume home " "meds     Hypothyroidism   -- Cont synthroid\"    ASSESSMENT      Enrollment  Primary Care Care Coordination Status: Not a Candidate    Discharge Assessment  How are you doing now that you are home?: Pt states \"I'm okay, still some pain in my back and legs and scheduled for twice a week PT for 2 weeks.\"  No recurrent falls since home, has been using his walker more now he's home.  How are your symptoms? (Red Flag symptoms escalate to triage hotline per guidelines): Improved  Do you feel your condition is stable enough to be safe at home until your provider visit?: Yes  Does the patient have their discharge instructions? : Yes  Does the patient have questions regarding their discharge instructions? : No  Were you started on any new medications or were there changes to any of your previous medications? : No  Does the patient have all of their medications?: Yes  Do you have questions regarding any of your medications? : No  Do you have all of your needed medical supplies or equipment (DME)?  (i.e. oxygen tank, CPAP, cane, etc.): Yes  Discharge follow-up appointment scheduled within 14 calendar days? : Yes  Discharge Follow Up Appointment Date: 05/24/22  Discharge Follow Up Appointment Scheduled with?: Primary Care Provider         Post-op (Clinicians Only)  Did the patient have surgery or a procedure: No        PLAN                                                      Outpatient Plan:      \"Follow-up and recommended labs and tests       Follow up with primary care provider, Negro Emmanuel, within 7 days for hospital follow- up.\"    No future appointments.      For any urgent concerns, please contact our 24 hour nurse triage line: [phone])         Kandi Funez RN                  "

## 2022-05-24 LAB
BACTERIA BLD CULT: NO GROWTH
BACTERIA BLD CULT: NO GROWTH

## 2022-10-18 ENCOUNTER — MEDICAL CORRESPONDENCE (OUTPATIENT)
Dept: HEALTH INFORMATION MANAGEMENT | Facility: CLINIC | Age: 87
End: 2022-10-18

## 2022-10-18 ENCOUNTER — TELEPHONE (OUTPATIENT)
Dept: GASTROENTEROLOGY | Facility: CLINIC | Age: 87
End: 2022-10-18

## 2022-10-18 ENCOUNTER — TRANSFERRED RECORDS (OUTPATIENT)
Dept: HEALTH INFORMATION MANAGEMENT | Facility: CLINIC | Age: 87
End: 2022-10-18

## 2022-10-18 NOTE — TELEPHONE ENCOUNTER
Daughter calling to schedule EGD saying that order was faxed by his dr office last week. No order in Epic at this time.   She will contact dr office again, provided  fax number to verify correct.

## 2022-10-19 ENCOUNTER — TRANSCRIBE ORDERS (OUTPATIENT)
Dept: OTHER | Age: 87
End: 2022-10-19
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
## 2022-10-19 DIAGNOSIS — T17.320A CHOKING DUE TO FOOD IN LARYNX, INITIAL ENCOUNTER: Primary | ICD-10-CM

## 2022-12-11 ENCOUNTER — ANESTHESIA EVENT (OUTPATIENT)
Dept: SURGERY | Facility: CLINIC | Age: 87
End: 2022-12-11
Payer: COMMERCIAL

## 2022-12-12 ENCOUNTER — HOSPITAL ENCOUNTER (OUTPATIENT)
Facility: CLINIC | Age: 87
Discharge: HOME | End: 2022-12-12
Attending: INTERNAL MEDICINE | Admitting: INTERNAL MEDICINE
Payer: COMMERCIAL

## 2022-12-12 ENCOUNTER — ANESTHESIA (OUTPATIENT)
Dept: SURGERY | Facility: CLINIC | Age: 87
End: 2022-12-12
Payer: COMMERCIAL

## 2022-12-12 VITALS
SYSTOLIC BLOOD PRESSURE: 128 MMHG | WEIGHT: 198.8 LBS | DIASTOLIC BLOOD PRESSURE: 67 MMHG | TEMPERATURE: 97.9 F | OXYGEN SATURATION: 93 % | RESPIRATION RATE: 16 BRPM | HEART RATE: 90 BPM | BODY MASS INDEX: 28.46 KG/M2 | HEIGHT: 70 IN

## 2022-12-12 DIAGNOSIS — K21.00 GASTROESOPHAGEAL REFLUX DISEASE WITH ESOPHAGITIS WITHOUT HEMORRHAGE: Primary | ICD-10-CM

## 2022-12-12 LAB
GLUCOSE BLDC GLUCOMTR-MCNC: 238 MG/DL (ref 70–99)
UPPER GI ENDOSCOPY: NORMAL

## 2022-12-12 PROCEDURE — 250N000011 HC RX IP 250 OP 636: Performed by: NURSE ANESTHETIST, CERTIFIED REGISTERED

## 2022-12-12 PROCEDURE — 250N000009 HC RX 250: Performed by: NURSE ANESTHETIST, CERTIFIED REGISTERED

## 2022-12-12 PROCEDURE — 82962 GLUCOSE BLOOD TEST: CPT

## 2022-12-12 PROCEDURE — 88305 TISSUE EXAM BY PATHOLOGIST: CPT | Mod: TC | Performed by: INTERNAL MEDICINE

## 2022-12-12 PROCEDURE — 360N000075 HC SURGERY LEVEL 2, PER MIN: Performed by: INTERNAL MEDICINE

## 2022-12-12 PROCEDURE — 370N000017 HC ANESTHESIA TECHNICAL FEE, PER MIN: Performed by: INTERNAL MEDICINE

## 2022-12-12 PROCEDURE — 999N000141 HC STATISTIC PRE-PROCEDURE NURSING ASSESSMENT: Performed by: INTERNAL MEDICINE

## 2022-12-12 PROCEDURE — 710N000012 HC RECOVERY PHASE 2, PER MINUTE: Performed by: INTERNAL MEDICINE

## 2022-12-12 PROCEDURE — 272N000001 HC OR GENERAL SUPPLY STERILE: Performed by: INTERNAL MEDICINE

## 2022-12-12 RX ORDER — MEPERIDINE HYDROCHLORIDE 25 MG/ML
12.5 INJECTION INTRAMUSCULAR; INTRAVENOUS; SUBCUTANEOUS
Status: DISCONTINUED | OUTPATIENT
Start: 2022-12-12 | End: 2022-12-12 | Stop reason: HOSPADM

## 2022-12-12 RX ORDER — SODIUM CHLORIDE, SODIUM LACTATE, POTASSIUM CHLORIDE, CALCIUM CHLORIDE 600; 310; 30; 20 MG/100ML; MG/100ML; MG/100ML; MG/100ML
INJECTION, SOLUTION INTRAVENOUS CONTINUOUS
Status: DISCONTINUED | OUTPATIENT
Start: 2022-12-12 | End: 2022-12-12 | Stop reason: HOSPADM

## 2022-12-12 RX ORDER — ONDANSETRON 2 MG/ML
4 INJECTION INTRAMUSCULAR; INTRAVENOUS EVERY 6 HOURS PRN
Status: CANCELLED | OUTPATIENT
Start: 2022-12-12

## 2022-12-12 RX ORDER — PROPOFOL 10 MG/ML
INJECTION, EMULSION INTRAVENOUS CONTINUOUS PRN
Status: DISCONTINUED | OUTPATIENT
Start: 2022-12-12 | End: 2022-12-12

## 2022-12-12 RX ORDER — FENTANYL CITRATE 50 UG/ML
50 INJECTION, SOLUTION INTRAMUSCULAR; INTRAVENOUS EVERY 5 MIN PRN
Status: DISCONTINUED | OUTPATIENT
Start: 2022-12-12 | End: 2022-12-12 | Stop reason: HOSPADM

## 2022-12-12 RX ORDER — NALOXONE HYDROCHLORIDE 0.4 MG/ML
0.4 INJECTION, SOLUTION INTRAMUSCULAR; INTRAVENOUS; SUBCUTANEOUS
Status: CANCELLED | OUTPATIENT
Start: 2022-12-12

## 2022-12-12 RX ORDER — ONDANSETRON 4 MG/1
4 TABLET, ORALLY DISINTEGRATING ORAL EVERY 6 HOURS PRN
Status: CANCELLED | OUTPATIENT
Start: 2022-12-12

## 2022-12-12 RX ORDER — LIDOCAINE HYDROCHLORIDE 10 MG/ML
INJECTION, SOLUTION INFILTRATION; PERINEURAL PRN
Status: DISCONTINUED | OUTPATIENT
Start: 2022-12-12 | End: 2022-12-12

## 2022-12-12 RX ORDER — FENTANYL CITRATE 50 UG/ML
25 INJECTION, SOLUTION INTRAMUSCULAR; INTRAVENOUS
Status: CANCELLED | OUTPATIENT
Start: 2022-12-12

## 2022-12-12 RX ORDER — PROCHLORPERAZINE MALEATE 5 MG
5 TABLET ORAL EVERY 6 HOURS PRN
Status: CANCELLED | OUTPATIENT
Start: 2022-12-12

## 2022-12-12 RX ORDER — FLUMAZENIL 0.1 MG/ML
0.2 INJECTION, SOLUTION INTRAVENOUS
Status: CANCELLED | OUTPATIENT
Start: 2022-12-12 | End: 2022-12-12

## 2022-12-12 RX ORDER — ONDANSETRON 2 MG/ML
4 INJECTION INTRAMUSCULAR; INTRAVENOUS
Status: DISCONTINUED | OUTPATIENT
Start: 2022-12-12 | End: 2022-12-12 | Stop reason: HOSPADM

## 2022-12-12 RX ORDER — LIDOCAINE 40 MG/G
CREAM TOPICAL
Status: DISCONTINUED | OUTPATIENT
Start: 2022-12-12 | End: 2022-12-12 | Stop reason: HOSPADM

## 2022-12-12 RX ORDER — NALOXONE HYDROCHLORIDE 0.4 MG/ML
0.2 INJECTION, SOLUTION INTRAMUSCULAR; INTRAVENOUS; SUBCUTANEOUS
Status: CANCELLED | OUTPATIENT
Start: 2022-12-12

## 2022-12-12 RX ORDER — PROPOFOL 10 MG/ML
INJECTION, EMULSION INTRAVENOUS PRN
Status: DISCONTINUED | OUTPATIENT
Start: 2022-12-12 | End: 2022-12-12

## 2022-12-12 RX ORDER — ONDANSETRON 4 MG/1
4 TABLET, ORALLY DISINTEGRATING ORAL EVERY 30 MIN PRN
Status: DISCONTINUED | OUTPATIENT
Start: 2022-12-12 | End: 2022-12-12 | Stop reason: HOSPADM

## 2022-12-12 RX ORDER — FENTANYL CITRATE 50 UG/ML
25 INJECTION, SOLUTION INTRAMUSCULAR; INTRAVENOUS EVERY 5 MIN PRN
Status: DISCONTINUED | OUTPATIENT
Start: 2022-12-12 | End: 2022-12-12 | Stop reason: HOSPADM

## 2022-12-12 RX ORDER — HALOPERIDOL 5 MG/ML
1 INJECTION INTRAMUSCULAR
Status: DISCONTINUED | OUTPATIENT
Start: 2022-12-12 | End: 2022-12-12 | Stop reason: HOSPADM

## 2022-12-12 RX ORDER — ONDANSETRON 2 MG/ML
4 INJECTION INTRAMUSCULAR; INTRAVENOUS EVERY 30 MIN PRN
Status: DISCONTINUED | OUTPATIENT
Start: 2022-12-12 | End: 2022-12-12 | Stop reason: HOSPADM

## 2022-12-12 RX ADMIN — PROPOFOL 30 MG: 10 INJECTION, EMULSION INTRAVENOUS at 07:33

## 2022-12-12 RX ADMIN — PROPOFOL 20 MG: 10 INJECTION, EMULSION INTRAVENOUS at 07:35

## 2022-12-12 RX ADMIN — PROPOFOL 125 MCG/KG/MIN: 10 INJECTION, EMULSION INTRAVENOUS at 07:33

## 2022-12-12 RX ADMIN — LIDOCAINE HYDROCHLORIDE 2 ML: 10 INJECTION, SOLUTION INFILTRATION; PERINEURAL at 07:33

## 2022-12-12 ASSESSMENT — ACTIVITIES OF DAILY LIVING (ADL)
ADLS_ACUITY_SCORE: 35
ADLS_ACUITY_SCORE: 35

## 2022-12-12 NOTE — OR NURSING
"Pre op glucose 238 reported to Dr. Powell. Patient's hx of low Na 130, increased A1c 8.6 and Hgb 10.8 and he stated \"I'm fine with that\". Continue to monitor, no new orders obtained.   "

## 2022-12-12 NOTE — ANESTHESIA PREPROCEDURE EVALUATION
"Anesthesia Pre-Procedure Evaluation    Patient: Lionel Chávez   MRN: [id number] : 1926        Procedure : Procedure(s):  ESOPHAGOGASTRODUODENOSCOPY          Past Medical History:   Diagnosis Date     Arthritis      CAD (coronary artery disease)      Depressive disorder      DM2 (diabetes mellitus, type 2) (H)      Hypertension      Thyroid disease       Past Surgical History:   Procedure Laterality Date     CATARACT EXTRACTION       EP ICD       NASAL SEPTOPLASTY W/ TURBINOPLASTY        Allergies   Allergen Reactions     Amiodarone Other (See Comments)     Per [facility] clinic records \"eye changes & thyroid gland side effects\"      Social History     Tobacco Use     Smoking status: Never     Smokeless tobacco: Never   Substance Use Topics     Alcohol use: Not Currently      Wt Readings from Last 1 Encounters:   22 90.2 kg (198 lb 12.8 oz)        Anesthesia Evaluation   Pt has had prior anesthetic.     No history of anesthetic complications       ROS/MED HX  ENT/Pulmonary:       Neurologic:     (+) peripheral neuropathy,     Cardiovascular:     (+) Dyslipidemia hypertension--CAD --stent-pacemaker,     METS/Exercise Tolerance:     Hematologic:     (+) anemia,     Musculoskeletal:   (+) arthritis,     GI/Hepatic:  - neg GI/hepatic ROS     Renal/Genitourinary:  - neg Renal ROS     Endo:     (+) type II DM, thyroid problem,     Psychiatric/Substance Use:  - neg psychiatric ROS     Infectious Disease:  - neg infectious disease ROS     Malignancy:  - neg malignancy ROS     Other:  - neg other ROS          Physical Exam    Airway        Mallampati: II   TM distance: > 3 FB   Neck ROM: limited     Respiratory Devices and Support         Dental       (+) upper dentures and lower dentures      Cardiovascular   cardiovascular exam normal          Pulmonary   pulmonary exam normal                OUTSIDE LABS:  CBC:   Lab Results   Component Value Date    WBC 1.9 (L) 2022    WBC 1.5 (L) 2022    HGB 10.1 " (L) 05/20/2022    HGB 10.2 (L) 05/19/2022    HCT 30.5 (L) 05/20/2022    HCT 30.6 (L) 05/19/2022     (L) 05/20/2022     (L) 05/19/2022     BMP:   Lab Results   Component Value Date     (L) 05/20/2022     (L) 05/18/2022    POTASSIUM 4.0 05/20/2022    POTASSIUM 4.5 05/18/2022    CHLORIDE 101 05/20/2022    CHLORIDE 102 05/18/2022    CO2 24 05/20/2022    CO2 20 (L) 05/18/2022    BUN 20 05/20/2022    BUN 35 (H) 05/18/2022    CR 0.86 05/20/2022    CR 1.40 (H) 05/18/2022     (H) 05/20/2022     (H) 05/18/2022     COAGS: No results found for: PTT, INR, FIBR  POC: No results found for: BGM, HCG, HCGS  HEPATIC:   Lab Results   Component Value Date    ALBUMIN 3.4 (L) 05/18/2022    PROTTOTAL 6.6 05/18/2022    ALT 27 05/18/2022    AST 31 05/18/2022    ALKPHOS 103 05/18/2022    BILITOTAL 1.1 (H) 05/18/2022     OTHER:   Lab Results   Component Value Date    LACT 1.2 05/18/2022    A1C 7.2 (H) 03/07/2022    MERLYN 8.7 05/20/2022    LIPASE 11 05/18/2022    TSH 0.92 03/07/2022       Anesthesia Plan    ASA Status:  3   NPO Status:  NPO Appropriate    Anesthesia Type: MAC.     - Reason for MAC: straight local not clinically adequate, chronic cardiopulmonary disease      Maintenance: TIVA.        Consents    Anesthesia Plan(s) and associated risks, benefits, and realistic alternatives discussed. Questions answered and patient/representative(s) expressed understanding.    - Discussed:     - Discussed with:  Patient      - Extended Intubation/Ventilatory Support Discussed: No.      - Patient is DNR/DNI Status: No    Use of blood products discussed: No .     Postoperative Care       PONV prophylaxis: Ondansetron (or other 5HT-3), Dexamethasone or Solumedrol     Comments:    Other Comments: Diprivan infusion.            Ludin Sandhu MD

## 2022-12-12 NOTE — ANESTHESIA CARE TRANSFER NOTE
Patient: Lionel Chávez    Procedure: Procedure(s):  ESOPHAGOGASTRODUODENOSCOPY WITH BIOPSY AND DILATION       Diagnosis: Choking due to food in larynx, initial encounter [T17.320A]  Diagnosis Additional Information: No value filed.    Anesthesia Type:   MAC     Note:    Oropharynx: oropharynx clear of all foreign objects    Oxygen Supplementation: room air    Independent Airway: airway patency satisfactory and stable  Dentition: dentition unchanged  Vital Signs Stable: post-procedure vital signs reviewed and stable  Report to RN Given: handoff report given  Patient transferred to: Phase II    Handoff Report: Identifed the Patient, Identified the Reponsible Provider, Reviewed the pertinent medical history, Discussed the surgical course, Reviewed Intra-OP anesthesia mangement and issues during anesthesia, Set expectations for post-procedure period and Allowed opportunity for questions and acknowledgement of understanding      Vitals:  Vitals Value Taken Time   /53 12/12/22 0745   Temp 36.6  C (97.9  F) 12/12/22 0745   Pulse 90 12/12/22 0747   Resp 16 12/12/22 0745   SpO2 93 % 12/12/22 0747       Electronically Signed By: JORGE LEA CRNA  December 12, 2022  7:49 AM

## 2022-12-12 NOTE — INTERVAL H&P NOTE
"I have reviewed the surgical (or preoperative) H&P that is linked to this encounter, and examined the patient. There are no significant changes    Clinical Conditions Present on Arrival:  Clinically Significant Risk Factors Present on Admission                    # Overweight: Estimated body mass index is 28.52 kg/m  as calculated from the following:    Height as of this encounter: 1.778 m (5' 10\").    Weight as of this encounter: 90.2 kg (198 lb 12.8 oz).       "

## 2022-12-12 NOTE — PRE-PROCEDURE
GENERAL PRE-PROCEDURE:   Procedure:  Egd  Date/Time:  12/12/2022 7:17 AM    Verbal consent obtained?: Yes    Risks and benefits: Risks, benefits and alternatives were discussed    DC Plan: Appropriate discharge home plan in place for patients who are going home after procedure   Consent given by:  Patient  Patient states understanding of procedure being performed: Yes    Patient's understanding of procedure matches consent: Yes    Procedure consent matches procedure scheduled: Yes    Expected level of sedation:  Moderate  Appropriately NPO:  Yes  ASA Class:  3  Mallampati  :  Grade 2- soft palate, base of uvula, tonsillar pillars, and portion of posterior pharyngeal wall visible  Lungs:  Lungs clear with good breath sounds bilaterally  Heart:  Normal heart sounds and rate  History & Physical reviewed:  History and physical reviewed and no updates needed  Statement of review:  I have reviewed the lab findings, diagnostic data, medications, and the plan for sedation

## 2022-12-14 LAB
PATH REPORT.COMMENTS IMP SPEC: NORMAL
PATH REPORT.COMMENTS IMP SPEC: NORMAL
PATH REPORT.FINAL DX SPEC: NORMAL
PATH REPORT.GROSS SPEC: NORMAL
PATH REPORT.MICROSCOPIC SPEC OTHER STN: NORMAL
PATH REPORT.RELEVANT HX SPEC: NORMAL
PHOTO IMAGE: NORMAL

## 2022-12-14 PROCEDURE — 88305 TISSUE EXAM BY PATHOLOGIST: CPT | Mod: 26 | Performed by: PATHOLOGY

## 2022-12-16 LAB — GLUCOSE BLDC GLUCOMTR-MCNC: 233 MG/DL (ref 70–99)

## 2023-01-12 ENCOUNTER — OFFICE VISIT (OUTPATIENT)
Dept: PULMONOLOGY | Facility: CLINIC | Age: 88
End: 2023-01-12
Payer: COMMERCIAL

## 2023-01-12 VITALS
SYSTOLIC BLOOD PRESSURE: 130 MMHG | BODY MASS INDEX: 28.46 KG/M2 | HEART RATE: 72 BPM | HEIGHT: 70 IN | WEIGHT: 198.8 LBS | OXYGEN SATURATION: 96 % | DIASTOLIC BLOOD PRESSURE: 78 MMHG

## 2023-01-12 DIAGNOSIS — R06.2 WHEEZING: Primary | ICD-10-CM

## 2023-01-12 PROCEDURE — 99204 OFFICE O/P NEW MOD 45 MIN: CPT | Performed by: INTERNAL MEDICINE

## 2023-01-12 NOTE — PROGRESS NOTES
CCx:Establishment of care for wheezing    HPI:Mr. Chávez is a 89 year old gentleman with a past medical history significant for arthritis, depression, CAD, DM2, HTN, thyroid disease, unstable gait amongst other issues who presents to the clinic today for the aforementioned chief complaint.   Is here with him and states that he was in the hospital leaves last year when the patient's wife was hospitalized and it was noted that he was short of breath and little wheezy.  The patient has had some issues with unstable gait and has struggled with some falls and was admitted at home [facility] last year with a concussion injury.  He underwent a CT head, chest, abdomen and pelvis as a part of the protocol and was noted to have some pulmonary nodules and questionable basilar fibrosis on that imaging study.  Separately he is also following with [facility] for his recently diagnosed pancytopenia.  He presently denies chest pain, chest tightness, fevers, chills, night sweats, PND or orthopnea.      ROS:  Pertinent positives alluded to in the HPI. Remainder of 10 point ROS is negative.     PMH:  1. Arthritis  2. CAD  3. Depression  4. DM2  5. HTN  6. Thyroid disease    PSH:  1. Cataract surgery  2. EGD  3. Nasal Septoplasty with turbinoplasty    Allergies:  Reviewed in Epic.    Family HX:  Reviewed in Epic.    Social Hx:  Reviewed in Epic.    Current Meds:  Current Outpatient Medications   Medication Sig Dispense Refill     acetaminophen (TYLENOL) 500 MG tablet Take 1,000 mg by mouth every 6 hours as needed for mild pain       aspirin 81 MG EC tablet [ASPIRIN 81 MG EC TABLET] Take 81 mg by mouth daily.       atorvastatin (LIPITOR) 80 MG tablet [ATORVASTATIN (LIPITOR) 80 MG TABLET] TAKE 1 TABLET BY MOUTH EVERYDAY AT BEDTIME 90 tablet 2     blood glucose test (ONETOUCH VERIO TEST STRIPS) strips [BLOOD GLUCOSE TEST (ONETOUCH VERIO TEST STRIPS) STRIPS] USE TO TEST BLOOD SUGAR 1-2 TIMES DAILY. 200 strip 11      "hydrochlorothiazide (HYDRODIURIL) 25 MG tablet Take 12.5 mg by mouth daily       lancets (ONETOUCH DELICA LANCETS) 30 gauge Misc [LANCETS (ONETOUCH DELICA LANCETS) 30 GAUGE MISC] Check blood glucose daily. 100 each 3     lancets 33 gauge Misc [LANCETS 33 GAUGE MISC] 2 (two) times a day.       levothyroxine (SYNTHROID, LEVOTHROID) 125 MCG tablet [LEVOTHYROXINE (SYNTHROID, LEVOTHROID) 125 MCG TABLET] TAKE 1 TABLET BY MOUTH EVERY DAY 90 tablet 3     metFORMIN (GLUCOPHAGE XR) 500 MG 24 hr tablet Take 500 mg by mouth daily (with dinner)       metoprolol succinate (TOPROL-XL) 25 MG [METOPROLOL SUCCINATE (TOPROL-XL) 25 MG] TAKE 1/2 TABLET BY MOUTH 2 TIMES DAILY 90 tablet 3     mirtazapine (REMERON) 7.5 MG tablet Take 7.5 mg by mouth At Bedtime       multivitamin  with lutein (OCUVITE WITH LUTEIN) CAPS per capsule Take 1 capsule by mouth 2 times daily       multivitamin (CENTRUM SILVER) tablet Take 1 tablet by mouth daily       omega-3 fatty acids-fish oil 340-1,000 mg cap [OMEGA-3 FATTY ACIDS-FISH -1,000 MG CAP] Take 1 capsule by mouth daily.        tamsulosin (FLOMAX) 0.4 MG capsule Take 0.4 mg by mouth daily       traZODone (DESYREL) 50 MG tablet Take 50 mg by mouth At Bedtime       Labs:  Reviewed.    Imaging studies:  CT CAP 11/19/22:  1.  Mild age-indeterminate superior plate compression deformity of L1.   2.  No evidence of solid organ injury.   3.  Multiple nonacute findings as above.     Physical Exam:  /78 (BP Location: Left arm)   Pulse 72   Ht 1.778 m (5' 10\")   Wt 90.2 kg (198 lb 12.8 oz)   SpO2 96%   BMI 28.52 kg/m    /78 (BP Location: Left arm)   Pulse 72   Ht 1.778 m (5' 10\")   Wt 90.2 kg (198 lb 12.8 oz)   SpO2 96%   BMI 28.52 kg/m    GEN: NAD  HEENT: PERRL, No JVD  LUNGS: CTA BL  CVS: S1 & S2 no added sounds  ABD: No HSM, BS audible  EXT: No edema, no rashes  NEURO: AO*3 CN2-12 intact    Assessment and Plan:Lionel Chávez is a 89 year old with a past medical history " significant for  arthritis, depression, CAD, DM2, HTN, thyroid disease, unstable gait who presents to clinic today for evaluation of wheezing. He underwent ambulatory oximetry in clinic today and was not noted to have hypoxia or desaturation.  He may have an element of reactive airways disease vs volume overload given at his EF is noted to be 50-55% and he demonstrates significant left atrial dilation. For the present we would recommend;    1. Wheezing: Given minimal symptoms and his age, fever or any further testing such as pulmonary function testing and the patient strongly agrees with this.  I have advised his daughter who is here with him that if he begins to have significant wheezing, chest pain or other concerning symptoms that they can contact us through [employer] but the moment I have no interventions to offer.  2. Follow-up: PRN basis.      Katy Renee  Pulmonary and Critical Care  [id number]

## 2023-01-12 NOTE — PATIENT INSTRUCTIONS
Your oxygen numbers look great even after walking.  If you become short of breath or wheezy, please call me.

## 2023-01-12 NOTE — PROGRESS NOTES
Patient oxygen saturation on RA at rest is 98%    Oxygen saturation after ambulating 500ft on RA is 95%.      Patient is ambulatory within his/her home.  Lani Maynard on 1/12/2023 at 3:33 PM

## 2023-05-25 ENCOUNTER — LAB REQUISITION (OUTPATIENT)
Dept: LAB | Facility: CLINIC | Age: 88
End: 2023-05-25
Payer: COMMERCIAL

## 2023-05-25 DIAGNOSIS — I12.9 HYPERTENSIVE CHRONIC KIDNEY DISEASE WITH STAGE 1 THROUGH STAGE 4 CHRONIC KIDNEY DISEASE, OR UNSPECIFIED CHRONIC KIDNEY DISEASE: ICD-10-CM

## 2023-05-25 DIAGNOSIS — E11.9 TYPE 2 DIABETES MELLITUS WITHOUT COMPLICATIONS (H): ICD-10-CM

## 2023-05-31 ENCOUNTER — LAB REQUISITION (OUTPATIENT)
Dept: LAB | Facility: CLINIC | Age: 88
End: 2023-05-31
Payer: COMMERCIAL

## 2023-05-31 DIAGNOSIS — E11.9 TYPE 2 DIABETES MELLITUS WITHOUT COMPLICATIONS (H): ICD-10-CM

## 2023-05-31 DIAGNOSIS — I12.9 HYPERTENSIVE CHRONIC KIDNEY DISEASE WITH STAGE 1 THROUGH STAGE 4 CHRONIC KIDNEY DISEASE, OR UNSPECIFIED CHRONIC KIDNEY DISEASE: ICD-10-CM

## 2023-06-01 LAB
ANION GAP SERPL CALCULATED.3IONS-SCNC: 12 MMOL/L (ref 7–15)
BUN SERPL-MCNC: 29.9 MG/DL (ref 8–23)
CALCIUM SERPL-MCNC: 10.6 MG/DL (ref 8.2–9.6)
CHLORIDE SERPL-SCNC: 100 MMOL/L (ref 98–107)
CREAT SERPL-MCNC: 1.02 MG/DL (ref 0.67–1.17)
DEPRECATED HCO3 PLAS-SCNC: 23 MMOL/L (ref 22–29)
GFR SERPL CREATININE-BSD FRML MDRD: 67 ML/MIN/1.73M2
GLUCOSE SERPL-MCNC: 141 MG/DL (ref 70–99)
HBA1C MFR BLD: 10 %
HGB BLD-MCNC: 9.8 G/DL (ref 13.3–17.7)
POTASSIUM SERPL-SCNC: 4.7 MMOL/L (ref 3.4–5.3)
SODIUM SERPL-SCNC: 135 MMOL/L (ref 136–145)

## 2023-06-01 PROCEDURE — 36415 COLL VENOUS BLD VENIPUNCTURE: CPT | Mod: ORL | Performed by: FAMILY MEDICINE

## 2023-06-01 PROCEDURE — P9604 ONE-WAY ALLOW PRORATED TRIP: HCPCS | Mod: ORL | Performed by: FAMILY MEDICINE

## 2023-06-01 PROCEDURE — 83036 HEMOGLOBIN GLYCOSYLATED A1C: CPT | Mod: ORL | Performed by: FAMILY MEDICINE

## 2023-06-01 PROCEDURE — 80048 BASIC METABOLIC PNL TOTAL CA: CPT | Mod: ORL | Performed by: FAMILY MEDICINE

## 2023-06-01 PROCEDURE — 85018 HEMOGLOBIN: CPT | Mod: ORL | Performed by: FAMILY MEDICINE

## 2023-09-06 ENCOUNTER — LAB REQUISITION (OUTPATIENT)
Dept: LAB | Facility: CLINIC | Age: 88
End: 2023-09-06
Payer: COMMERCIAL

## 2023-09-06 DIAGNOSIS — E11.9 TYPE 2 DIABETES MELLITUS WITHOUT COMPLICATIONS (H): ICD-10-CM

## 2023-09-06 DIAGNOSIS — D64.9 ANEMIA, UNSPECIFIED: ICD-10-CM

## 2023-09-06 DIAGNOSIS — E87.1 HYPO-OSMOLALITY AND HYPONATREMIA: ICD-10-CM

## 2023-09-06 DIAGNOSIS — E03.9 HYPOTHYROIDISM, UNSPECIFIED: ICD-10-CM

## 2023-09-07 LAB
ANION GAP SERPL CALCULATED.3IONS-SCNC: 12 MMOL/L (ref 7–15)
BUN SERPL-MCNC: 35.7 MG/DL (ref 8–23)
CALCIUM SERPL-MCNC: 9.5 MG/DL (ref 8.2–9.6)
CHLORIDE SERPL-SCNC: 99 MMOL/L (ref 98–107)
CREAT SERPL-MCNC: 1.27 MG/DL (ref 0.67–1.17)
DEPRECATED HCO3 PLAS-SCNC: 22 MMOL/L (ref 22–29)
EGFRCR SERPLBLD CKD-EPI 2021: 51 ML/MIN/1.73M2
ERYTHROCYTE [DISTWIDTH] IN BLOOD BY AUTOMATED COUNT: 13.6 % (ref 10–15)
GLUCOSE SERPL-MCNC: 136 MG/DL (ref 70–99)
HBA1C MFR BLD: 8.7 %
HCT VFR BLD AUTO: 33.6 % (ref 40–53)
HGB BLD-MCNC: 10.8 G/DL (ref 13.3–17.7)
MCH RBC QN AUTO: 30.8 PG (ref 26.5–33)
MCHC RBC AUTO-ENTMCNC: 32.1 G/DL (ref 31.5–36.5)
MCV RBC AUTO: 96 FL (ref 78–100)
PLATELET # BLD AUTO: 168 10E3/UL (ref 150–450)
POTASSIUM SERPL-SCNC: 4.6 MMOL/L (ref 3.4–5.3)
RBC # BLD AUTO: 3.51 10E6/UL (ref 4.4–5.9)
SODIUM SERPL-SCNC: 133 MMOL/L (ref 136–145)
TSH SERPL DL<=0.005 MIU/L-ACNC: 3.99 UIU/ML (ref 0.3–4.2)
WBC # BLD AUTO: 3.1 10E3/UL (ref 4–11)

## 2023-09-07 PROCEDURE — 84443 ASSAY THYROID STIM HORMONE: CPT | Mod: ORL | Performed by: NURSE PRACTITIONER

## 2023-09-07 PROCEDURE — 80048 BASIC METABOLIC PNL TOTAL CA: CPT | Mod: ORL | Performed by: NURSE PRACTITIONER

## 2023-09-07 PROCEDURE — 85027 COMPLETE CBC AUTOMATED: CPT | Mod: ORL | Performed by: NURSE PRACTITIONER

## 2023-09-07 PROCEDURE — 36415 COLL VENOUS BLD VENIPUNCTURE: CPT | Mod: ORL | Performed by: NURSE PRACTITIONER

## 2023-09-07 PROCEDURE — 83036 HEMOGLOBIN GLYCOSYLATED A1C: CPT | Mod: ORL | Performed by: NURSE PRACTITIONER

## 2023-09-07 PROCEDURE — P9604 ONE-WAY ALLOW PRORATED TRIP: HCPCS | Mod: ORL | Performed by: NURSE PRACTITIONER

## 2024-03-27 ENCOUNTER — LAB REQUISITION (OUTPATIENT)
Dept: LAB | Facility: CLINIC | Age: 89
End: 2024-03-27
Payer: COMMERCIAL

## 2024-03-27 DIAGNOSIS — D61.818 OTHER PANCYTOPENIA (H): ICD-10-CM

## 2024-03-27 DIAGNOSIS — I10 ESSENTIAL (PRIMARY) HYPERTENSION: ICD-10-CM

## 2024-03-27 DIAGNOSIS — E11.9 TYPE 2 DIABETES MELLITUS WITHOUT COMPLICATIONS (H): ICD-10-CM

## 2024-03-28 LAB
ANION GAP SERPL CALCULATED.3IONS-SCNC: 11 MMOL/L (ref 7–15)
BUN SERPL-MCNC: 31.3 MG/DL (ref 8–23)
CALCIUM SERPL-MCNC: 9.5 MG/DL (ref 8.2–9.6)
CHLORIDE SERPL-SCNC: 100 MMOL/L (ref 98–107)
CREAT SERPL-MCNC: 1.31 MG/DL (ref 0.67–1.17)
DEPRECATED HCO3 PLAS-SCNC: 24 MMOL/L (ref 22–29)
EGFRCR SERPLBLD CKD-EPI 2021: 50 ML/MIN/1.73M2
ERYTHROCYTE [DISTWIDTH] IN BLOOD BY AUTOMATED COUNT: 13.8 % (ref 10–15)
GLUCOSE SERPL-MCNC: 144 MG/DL (ref 70–99)
HBA1C MFR BLD: 8.9 %
HCT VFR BLD AUTO: 32.4 % (ref 40–53)
HGB BLD-MCNC: 10.5 G/DL (ref 13.3–17.7)
MCH RBC QN AUTO: 31.3 PG (ref 26.5–33)
MCHC RBC AUTO-ENTMCNC: 32.4 G/DL (ref 31.5–36.5)
MCV RBC AUTO: 97 FL (ref 78–100)
PLATELET # BLD AUTO: 158 10E3/UL (ref 150–450)
POTASSIUM SERPL-SCNC: 4.7 MMOL/L (ref 3.4–5.3)
RBC # BLD AUTO: 3.35 10E6/UL (ref 4.4–5.9)
SODIUM SERPL-SCNC: 135 MMOL/L (ref 135–145)
WBC # BLD AUTO: 2.8 10E3/UL (ref 4–11)

## 2024-03-28 PROCEDURE — 80048 BASIC METABOLIC PNL TOTAL CA: CPT | Mod: ORL | Performed by: NURSE PRACTITIONER

## 2024-03-28 PROCEDURE — 85027 COMPLETE CBC AUTOMATED: CPT | Mod: ORL | Performed by: NURSE PRACTITIONER

## 2024-03-28 PROCEDURE — 83036 HEMOGLOBIN GLYCOSYLATED A1C: CPT | Mod: ORL | Performed by: NURSE PRACTITIONER

## 2024-03-28 PROCEDURE — 36415 COLL VENOUS BLD VENIPUNCTURE: CPT | Mod: ORL | Performed by: NURSE PRACTITIONER

## 2024-03-28 PROCEDURE — P9604 ONE-WAY ALLOW PRORATED TRIP: HCPCS | Mod: ORL | Performed by: NURSE PRACTITIONER

## 2024-06-01 ENCOUNTER — LAB REQUISITION (OUTPATIENT)
Dept: LAB | Facility: CLINIC | Age: 89
End: 2024-06-01
Payer: COMMERCIAL

## 2024-06-01 DIAGNOSIS — N18.30 CHRONIC KIDNEY DISEASE, STAGE 3 UNSPECIFIED (H): ICD-10-CM

## 2024-06-01 DIAGNOSIS — D64.9 ANEMIA, UNSPECIFIED: ICD-10-CM

## 2024-06-01 DIAGNOSIS — R35.0 FREQUENCY OF MICTURITION: ICD-10-CM

## 2024-06-03 LAB
ANION GAP SERPL CALCULATED.3IONS-SCNC: 16 MMOL/L (ref 7–15)
BUN SERPL-MCNC: 29.9 MG/DL (ref 8–23)
CALCIUM SERPL-MCNC: 10 MG/DL (ref 8.2–9.6)
CHLORIDE SERPL-SCNC: 96 MMOL/L (ref 98–107)
CREAT SERPL-MCNC: 1.32 MG/DL (ref 0.67–1.17)
DEPRECATED HCO3 PLAS-SCNC: 20 MMOL/L (ref 22–29)
EGFRCR SERPLBLD CKD-EPI 2021: 49 ML/MIN/1.73M2
ERYTHROCYTE [DISTWIDTH] IN BLOOD BY AUTOMATED COUNT: 14.6 % (ref 10–15)
GLUCOSE SERPL-MCNC: 205 MG/DL (ref 70–99)
HCT VFR BLD AUTO: 32.6 % (ref 40–53)
HGB BLD-MCNC: 10.8 G/DL (ref 13.3–17.7)
MCH RBC QN AUTO: 31.6 PG (ref 26.5–33)
MCHC RBC AUTO-ENTMCNC: 33.1 G/DL (ref 31.5–36.5)
MCV RBC AUTO: 95 FL (ref 78–100)
PLATELET # BLD AUTO: 260 10E3/UL (ref 150–450)
POTASSIUM SERPL-SCNC: 4.2 MMOL/L (ref 3.4–5.3)
RBC # BLD AUTO: 3.42 10E6/UL (ref 4.4–5.9)
SODIUM SERPL-SCNC: 132 MMOL/L (ref 135–145)
WBC # BLD AUTO: 4.8 10E3/UL (ref 4–11)

## 2024-06-03 PROCEDURE — 80048 BASIC METABOLIC PNL TOTAL CA: CPT | Mod: ORL | Performed by: NURSE PRACTITIONER

## 2024-06-03 PROCEDURE — P9604 ONE-WAY ALLOW PRORATED TRIP: HCPCS | Mod: ORL | Performed by: NURSE PRACTITIONER

## 2024-06-03 PROCEDURE — 36415 COLL VENOUS BLD VENIPUNCTURE: CPT | Mod: ORL | Performed by: NURSE PRACTITIONER

## 2024-06-03 PROCEDURE — 85027 COMPLETE CBC AUTOMATED: CPT | Mod: ORL | Performed by: NURSE PRACTITIONER

## 2024-06-04 ENCOUNTER — LAB REQUISITION (OUTPATIENT)
Dept: LAB | Facility: CLINIC | Age: 89
End: 2024-06-04
Payer: COMMERCIAL

## 2024-06-04 DIAGNOSIS — R35.0 FREQUENCY OF MICTURITION: ICD-10-CM

## 2024-06-04 LAB
ALBUMIN UR-MCNC: 30 MG/DL
APPEARANCE UR: CLEAR
BILIRUB UR QL STRIP: NEGATIVE
COLOR UR AUTO: ABNORMAL
GLUCOSE UR STRIP-MCNC: NEGATIVE MG/DL
HGB UR QL STRIP: NEGATIVE
HYALINE CASTS: 1 /LPF
KETONES UR STRIP-MCNC: NEGATIVE MG/DL
LEUKOCYTE ESTERASE UR QL STRIP: NEGATIVE
MUCOUS THREADS #/AREA URNS LPF: PRESENT /LPF
NITRATE UR QL: NEGATIVE
PH UR STRIP: 5.5 [PH] (ref 5–7)
RBC URINE: 1 /HPF
SP GR UR STRIP: 1.02 (ref 1–1.03)
SQUAMOUS EPITHELIAL: <1 /HPF
UROBILINOGEN UR STRIP-MCNC: NORMAL MG/DL
WBC URINE: 6 /HPF

## 2024-06-04 PROCEDURE — 81001 URINALYSIS AUTO W/SCOPE: CPT | Mod: ORL | Performed by: NURSE PRACTITIONER

## 2024-09-06 ENCOUNTER — LAB REQUISITION (OUTPATIENT)
Dept: LAB | Facility: CLINIC | Age: 89
End: 2024-09-06
Payer: COMMERCIAL

## 2024-09-06 DIAGNOSIS — I25.10 ATHEROSCLEROTIC HEART DISEASE OF NATIVE CORONARY ARTERY WITHOUT ANGINA PECTORIS: ICD-10-CM

## 2024-09-06 DIAGNOSIS — D61.818 OTHER PANCYTOPENIA (H): ICD-10-CM

## 2024-09-20 ENCOUNTER — LAB REQUISITION (OUTPATIENT)
Dept: LAB | Facility: CLINIC | Age: 89
End: 2024-09-20
Payer: COMMERCIAL

## 2024-09-20 DIAGNOSIS — E87.1 HYPO-OSMOLALITY AND HYPONATREMIA: ICD-10-CM

## 2024-09-23 LAB
ANION GAP SERPL CALCULATED.3IONS-SCNC: 13 MMOL/L (ref 7–15)
BUN SERPL-MCNC: 21.6 MG/DL (ref 8–23)
CALCIUM SERPL-MCNC: 9.1 MG/DL (ref 8.8–10.4)
CHLORIDE SERPL-SCNC: 96 MMOL/L (ref 98–107)
CREAT SERPL-MCNC: 1.03 MG/DL (ref 0.67–1.17)
EGFRCR SERPLBLD CKD-EPI 2021: 66 ML/MIN/1.73M2
GLUCOSE SERPL-MCNC: 210 MG/DL (ref 70–99)
HCO3 SERPL-SCNC: 23 MMOL/L (ref 22–29)
POTASSIUM SERPL-SCNC: 3.6 MMOL/L (ref 3.4–5.3)
SODIUM SERPL-SCNC: 132 MMOL/L (ref 135–145)

## 2024-09-23 PROCEDURE — P9604 ONE-WAY ALLOW PRORATED TRIP: HCPCS | Mod: ORL | Performed by: NURSE PRACTITIONER

## 2024-09-23 PROCEDURE — 80048 BASIC METABOLIC PNL TOTAL CA: CPT | Mod: ORL | Performed by: NURSE PRACTITIONER

## 2024-09-23 PROCEDURE — 36415 COLL VENOUS BLD VENIPUNCTURE: CPT | Mod: ORL | Performed by: NURSE PRACTITIONER

## 2024-09-25 ENCOUNTER — LAB REQUISITION (OUTPATIENT)
Dept: LAB | Facility: CLINIC | Age: 89
End: 2024-09-25
Payer: COMMERCIAL

## 2024-09-25 DIAGNOSIS — E87.1 HYPO-OSMOLALITY AND HYPONATREMIA: ICD-10-CM

## 2024-09-26 LAB
ALBUMIN SERPL BCG-MCNC: 3.8 G/DL (ref 3.5–5.2)
ALP SERPL-CCNC: 61 U/L (ref 40–150)
ALT SERPL W P-5'-P-CCNC: 12 U/L (ref 0–70)
ANION GAP SERPL CALCULATED.3IONS-SCNC: 9 MMOL/L (ref 7–15)
AST SERPL W P-5'-P-CCNC: 17 U/L (ref 0–45)
BILIRUB SERPL-MCNC: 0.4 MG/DL
BUN SERPL-MCNC: 19.9 MG/DL (ref 8–23)
CALCIUM SERPL-MCNC: 9.8 MG/DL (ref 8.8–10.4)
CHLORIDE SERPL-SCNC: 94 MMOL/L (ref 98–107)
CREAT SERPL-MCNC: 1 MG/DL (ref 0.67–1.17)
EGFRCR SERPLBLD CKD-EPI 2021: 68 ML/MIN/1.73M2
GLUCOSE SERPL-MCNC: 154 MG/DL (ref 70–99)
HCO3 SERPL-SCNC: 27 MMOL/L (ref 22–29)
POTASSIUM SERPL-SCNC: 3.9 MMOL/L (ref 3.4–5.3)
PROT SERPL-MCNC: 6.7 G/DL (ref 6.4–8.3)
SODIUM SERPL-SCNC: 130 MMOL/L (ref 135–145)

## 2024-09-26 PROCEDURE — P9604 ONE-WAY ALLOW PRORATED TRIP: HCPCS | Mod: ORL | Performed by: NURSE PRACTITIONER

## 2024-09-26 PROCEDURE — 80053 COMPREHEN METABOLIC PANEL: CPT | Mod: ORL | Performed by: NURSE PRACTITIONER

## 2024-09-26 PROCEDURE — 36415 COLL VENOUS BLD VENIPUNCTURE: CPT | Mod: ORL | Performed by: NURSE PRACTITIONER

## 2024-10-02 ENCOUNTER — LAB REQUISITION (OUTPATIENT)
Dept: LAB | Facility: CLINIC | Age: 89
End: 2024-10-02
Payer: COMMERCIAL

## 2024-10-02 DIAGNOSIS — D61.818 OTHER PANCYTOPENIA (H): ICD-10-CM

## 2024-10-02 DIAGNOSIS — E03.9 HYPOTHYROIDISM, UNSPECIFIED: ICD-10-CM

## 2024-10-02 DIAGNOSIS — E11.9 TYPE 2 DIABETES MELLITUS WITHOUT COMPLICATIONS (H): ICD-10-CM

## 2024-10-02 DIAGNOSIS — E87.1 HYPO-OSMOLALITY AND HYPONATREMIA: ICD-10-CM

## 2024-10-03 LAB
ERYTHROCYTE [DISTWIDTH] IN BLOOD BY AUTOMATED COUNT: 13.9 % (ref 10–15)
EST. AVERAGE GLUCOSE BLD GHB EST-MCNC: 183 MG/DL
HBA1C MFR BLD: 8 %
HCT VFR BLD AUTO: 31.1 % (ref 40–53)
HGB BLD-MCNC: 9.9 G/DL (ref 13.3–17.7)
MAGNESIUM SERPL-MCNC: 1.7 MG/DL (ref 1.7–2.3)
MCH RBC QN AUTO: 31.7 PG (ref 26.5–33)
MCHC RBC AUTO-ENTMCNC: 31.8 G/DL (ref 31.5–36.5)
MCV RBC AUTO: 100 FL (ref 78–100)
PLATELET # BLD AUTO: 204 10E3/UL (ref 150–450)
RBC # BLD AUTO: 3.12 10E6/UL (ref 4.4–5.9)
SODIUM SERPL-SCNC: 131 MMOL/L (ref 135–145)
TSH SERPL DL<=0.005 MIU/L-ACNC: 2.25 UIU/ML (ref 0.3–4.2)
WBC # BLD AUTO: 2.7 10E3/UL (ref 4–11)

## 2024-10-03 PROCEDURE — 85027 COMPLETE CBC AUTOMATED: CPT | Mod: ORL | Performed by: NURSE PRACTITIONER

## 2024-10-03 PROCEDURE — 36415 COLL VENOUS BLD VENIPUNCTURE: CPT | Mod: ORL | Performed by: NURSE PRACTITIONER

## 2024-10-03 PROCEDURE — 84295 ASSAY OF SERUM SODIUM: CPT | Mod: ORL | Performed by: NURSE PRACTITIONER

## 2024-10-03 PROCEDURE — 83735 ASSAY OF MAGNESIUM: CPT | Mod: ORL | Performed by: NURSE PRACTITIONER

## 2024-10-03 PROCEDURE — 84443 ASSAY THYROID STIM HORMONE: CPT | Mod: ORL | Performed by: NURSE PRACTITIONER

## 2024-10-03 PROCEDURE — P9604 ONE-WAY ALLOW PRORATED TRIP: HCPCS | Mod: ORL | Performed by: NURSE PRACTITIONER

## 2024-10-03 PROCEDURE — 83036 HEMOGLOBIN GLYCOSYLATED A1C: CPT | Mod: ORL | Performed by: NURSE PRACTITIONER

## 2024-12-18 ENCOUNTER — LAB REQUISITION (OUTPATIENT)
Dept: LAB | Facility: CLINIC | Age: 89
End: 2024-12-18
Payer: COMMERCIAL

## 2024-12-18 DIAGNOSIS — E87.1 HYPO-OSMOLALITY AND HYPONATREMIA: ICD-10-CM

## 2024-12-19 LAB
ANION GAP SERPL CALCULATED.3IONS-SCNC: 10 MMOL/L (ref 7–15)
BUN SERPL-MCNC: 19.3 MG/DL (ref 8–23)
CALCIUM SERPL-MCNC: 9.2 MG/DL (ref 8.8–10.4)
CHLORIDE SERPL-SCNC: 96 MMOL/L (ref 98–107)
CREAT SERPL-MCNC: 1.17 MG/DL (ref 0.67–1.17)
EGFRCR SERPLBLD CKD-EPI 2021: 56 ML/MIN/1.73M2
GLUCOSE SERPL-MCNC: 269 MG/DL (ref 70–99)
HCO3 SERPL-SCNC: 25 MMOL/L (ref 22–29)
POTASSIUM SERPL-SCNC: 4.6 MMOL/L (ref 3.4–5.3)
SODIUM SERPL-SCNC: 131 MMOL/L (ref 135–145)

## 2024-12-19 PROCEDURE — 36415 COLL VENOUS BLD VENIPUNCTURE: CPT | Mod: ORL | Performed by: NURSE PRACTITIONER

## 2024-12-19 PROCEDURE — 80048 BASIC METABOLIC PNL TOTAL CA: CPT | Mod: ORL | Performed by: NURSE PRACTITIONER

## 2024-12-19 PROCEDURE — P9604 ONE-WAY ALLOW PRORATED TRIP: HCPCS | Mod: ORL | Performed by: NURSE PRACTITIONER

## (undated) DEVICE — SOL WATER IRRIG 1000ML BOTTLE 2F7114

## (undated) DEVICE — TUBING SUCTION MEDI-VAC 1/4"X20' N620A - HE

## (undated) DEVICE — SUCTION MANIFOLD NEPTUNE 2 SYS 1 PORT 702-025-000

## (undated) DEVICE — FORCEP BIOPSY DISP 000386

## (undated) RX ORDER — PROPOFOL 10 MG/ML
INJECTION, EMULSION INTRAVENOUS
Status: DISPENSED
Start: 2022-12-12

## (undated) RX ORDER — LIDOCAINE HYDROCHLORIDE 10 MG/ML
INJECTION, SOLUTION EPIDURAL; INFILTRATION; INTRACAUDAL; PERINEURAL
Status: DISPENSED
Start: 2022-12-12